# Patient Record
Sex: FEMALE | Race: OTHER | HISPANIC OR LATINO
[De-identification: names, ages, dates, MRNs, and addresses within clinical notes are randomized per-mention and may not be internally consistent; named-entity substitution may affect disease eponyms.]

---

## 2017-12-05 ENCOUNTER — LABORATORY RESULT (OUTPATIENT)
Age: 27
End: 2017-12-05

## 2017-12-06 ENCOUNTER — APPOINTMENT (OUTPATIENT)
Dept: OBGYN | Facility: CLINIC | Age: 27
End: 2017-12-06
Payer: MEDICAID

## 2017-12-06 ENCOUNTER — APPOINTMENT (OUTPATIENT)
Dept: OBGYN | Facility: CLINIC | Age: 27
End: 2017-12-06

## 2017-12-06 ENCOUNTER — LABORATORY RESULT (OUTPATIENT)
Age: 27
End: 2017-12-06

## 2017-12-06 VITALS — BODY MASS INDEX: 35.7 KG/M2 | HEIGHT: 62 IN | WEIGHT: 194 LBS

## 2017-12-06 PROCEDURE — 99214 OFFICE O/P EST MOD 30 MIN: CPT

## 2017-12-07 ENCOUNTER — NON-APPOINTMENT (OUTPATIENT)
Age: 27
End: 2017-12-07

## 2017-12-07 LAB
BASOPHILS # BLD AUTO: 0.01 K/UL
BASOPHILS NFR BLD AUTO: 0.1 %
BLD GP AB SCN SERPL QL: NORMAL
EOSINOPHIL # BLD AUTO: 0.12 K/UL
EOSINOPHIL NFR BLD AUTO: 1.1 %
HBA1C MFR BLD HPLC: 5.3 %
HBV SURFACE AG SER QL: NONREACTIVE
HCT VFR BLD CALC: 41.5 %
HGB BLD-MCNC: 14 G/DL
IMM GRANULOCYTES NFR BLD AUTO: 0.3 %
LYMPHOCYTES # BLD AUTO: 2.12 K/UL
LYMPHOCYTES NFR BLD AUTO: 19.5 %
MAN DIFF?: NORMAL
MCHC RBC-ENTMCNC: 31.1 PG
MCHC RBC-ENTMCNC: 33.7 GM/DL
MCV RBC AUTO: 92.2 FL
MEV IGG FLD QL IA: 18.7 AU/ML
MEV IGG+IGM SER-IMP: NEGATIVE
MONOCYTES # BLD AUTO: 0.61 K/UL
MONOCYTES NFR BLD AUTO: 5.6 %
NEUTROPHILS # BLD AUTO: 7.99 K/UL
NEUTROPHILS NFR BLD AUTO: 73.4 %
PLATELET # BLD AUTO: 211 K/UL
RBC # BLD: 4.5 M/UL
RBC # FLD: 14 %
VZV AB TITR SER: POSITIVE
VZV IGG SER IF-ACNC: 225.6 INDEX
WBC # FLD AUTO: 10.88 K/UL

## 2017-12-08 LAB
ADJUSTED MITOGEN: 7.82 IU/ML
ADJUSTED TB AG: -0.01 IU/ML
BACTERIA UR CULT: NORMAL
C TRACH RRNA SPEC QL NAA+PROBE: NOT DETECTED
HPV HIGH+LOW RISK DNA PNL CVX: NOT DETECTED
M TB IFN-G BLD-IMP: NEGATIVE
MUV AB SER-ACNC: NORMAL
MUV IGG SER QL IA: 9.3 AU/ML
N GONORRHOEA RRNA SPEC QL NAA+PROBE: NOT DETECTED
QUANTIFERON GOLD NIL: 0.02 IU/ML
SOURCE TP AMPLIFICATION: NORMAL

## 2017-12-10 LAB — CYTOLOGY CVX/VAG DOC THIN PREP: NORMAL

## 2017-12-13 LAB — FMR1 GENE MUT ANL BLD/T: NORMAL

## 2017-12-15 ENCOUNTER — APPOINTMENT (OUTPATIENT)
Dept: ANTEPARTUM | Facility: CLINIC | Age: 27
End: 2017-12-15
Payer: MEDICAID

## 2017-12-15 ENCOUNTER — ASOB RESULT (OUTPATIENT)
Age: 27
End: 2017-12-15

## 2017-12-15 ENCOUNTER — APPOINTMENT (OUTPATIENT)
Dept: MATERNAL FETAL MEDICINE | Facility: CLINIC | Age: 27
End: 2017-12-15
Payer: MEDICAID

## 2017-12-15 PROCEDURE — 76813 OB US NUCHAL MEAS 1 GEST: CPT

## 2017-12-15 PROCEDURE — 76801 OB US < 14 WKS SINGLE FETUS: CPT

## 2017-12-15 PROCEDURE — 36415 COLL VENOUS BLD VENIPUNCTURE: CPT

## 2017-12-15 PROCEDURE — 36416 COLLJ CAPILLARY BLOOD SPEC: CPT

## 2017-12-15 PROCEDURE — 99211 OFF/OP EST MAY X REQ PHY/QHP: CPT | Mod: 25

## 2017-12-18 LAB
AR GENE MUT ANL BLD/T: NORMAL
CFTR MUT TESTED BLD/T: NORMAL

## 2017-12-29 ENCOUNTER — APPOINTMENT (OUTPATIENT)
Dept: MATERNAL FETAL MEDICINE | Facility: CLINIC | Age: 27
End: 2017-12-29
Payer: MEDICAID

## 2017-12-29 ENCOUNTER — ASOB RESULT (OUTPATIENT)
Age: 27
End: 2017-12-29

## 2017-12-29 LAB
1ST TRIMESTER DATA: NORMAL
ADDENDUM DOC: NORMAL
AFP PNL SERPL: NORMAL
AFP SERPL-ACNC: NORMAL
CLINICAL BIOCHEMIST REVIEW: NORMAL
FREE BETA HCG 1ST TRIMESTER: NORMAL
Lab: NORMAL
NASAL BONE: PRESENT
NOTES NTD: NORMAL
NT: NORMAL
PAPP-A SERPL-ACNC: NORMAL
TRISOMY 18/3: NORMAL

## 2017-12-29 PROCEDURE — 99241 OFFICE CONSULTATION NEW/ESTAB PATIENT 15 MIN: CPT

## 2018-01-03 ENCOUNTER — NON-APPOINTMENT (OUTPATIENT)
Age: 28
End: 2018-01-03

## 2018-01-03 ENCOUNTER — APPOINTMENT (OUTPATIENT)
Dept: OBGYN | Facility: CLINIC | Age: 28
End: 2018-01-03
Payer: MEDICAID

## 2018-01-03 VITALS
WEIGHT: 194 LBS | HEART RATE: 96 BPM | BODY MASS INDEX: 35.7 KG/M2 | HEIGHT: 62 IN | SYSTOLIC BLOOD PRESSURE: 106 MMHG | DIASTOLIC BLOOD PRESSURE: 72 MMHG

## 2018-01-03 PROCEDURE — 99212 OFFICE O/P EST SF 10 MIN: CPT

## 2018-01-12 ENCOUNTER — APPOINTMENT (OUTPATIENT)
Dept: ANTEPARTUM | Facility: CLINIC | Age: 28
End: 2018-01-12

## 2018-01-18 LAB
1ST TRIMESTER DATA: NORMAL
2ND TRIMESTER DATA: NORMAL
AFP PNL SERPL: NORMAL
AFP SERPL-ACNC: NORMAL
AFP SERPL-ACNC: NORMAL
B-HCG FREE SERPL-MCNC: NORMAL
CLINICAL BIOCHEMIST REVIEW: NORMAL
FREE BETA HCG 1ST TRIMESTER: NORMAL
INHIBIN A SERPL-MCNC: NORMAL
NASAL BONE: PRESENT
NOTES NTD: NORMAL
NT: NORMAL
PAPP-A SERPL-ACNC: NORMAL
U ESTRIOL SERPL-SCNC: NORMAL

## 2018-01-26 ENCOUNTER — APPOINTMENT (OUTPATIENT)
Dept: MATERNAL FETAL MEDICINE | Facility: CLINIC | Age: 28
End: 2018-01-26

## 2018-01-31 ENCOUNTER — NON-APPOINTMENT (OUTPATIENT)
Age: 28
End: 2018-01-31

## 2018-01-31 ENCOUNTER — APPOINTMENT (OUTPATIENT)
Dept: OBGYN | Facility: CLINIC | Age: 28
End: 2018-01-31
Payer: MEDICAID

## 2018-01-31 ENCOUNTER — TRANSCRIPTION ENCOUNTER (OUTPATIENT)
Age: 28
End: 2018-01-31

## 2018-01-31 VITALS
DIASTOLIC BLOOD PRESSURE: 71 MMHG | HEIGHT: 62 IN | HEART RATE: 98 BPM | BODY MASS INDEX: 36.72 KG/M2 | SYSTOLIC BLOOD PRESSURE: 105 MMHG | WEIGHT: 199.51 LBS

## 2018-01-31 PROCEDURE — 99212 OFFICE O/P EST SF 10 MIN: CPT

## 2018-02-09 ENCOUNTER — APPOINTMENT (OUTPATIENT)
Dept: ANTEPARTUM | Facility: CLINIC | Age: 28
End: 2018-02-09
Payer: MEDICAID

## 2018-02-09 ENCOUNTER — ASOB RESULT (OUTPATIENT)
Age: 28
End: 2018-02-09

## 2018-02-09 PROCEDURE — 76817 TRANSVAGINAL US OBSTETRIC: CPT

## 2018-02-09 PROCEDURE — 76811 OB US DETAILED SNGL FETUS: CPT

## 2018-02-26 ENCOUNTER — ASOB RESULT (OUTPATIENT)
Age: 28
End: 2018-02-26

## 2018-02-26 ENCOUNTER — APPOINTMENT (OUTPATIENT)
Dept: ANTEPARTUM | Facility: CLINIC | Age: 28
End: 2018-02-26
Payer: MEDICAID

## 2018-02-26 PROCEDURE — 76816 OB US FOLLOW-UP PER FETUS: CPT

## 2018-03-02 ENCOUNTER — NON-APPOINTMENT (OUTPATIENT)
Age: 28
End: 2018-03-02

## 2018-03-02 ENCOUNTER — APPOINTMENT (OUTPATIENT)
Dept: OBGYN | Facility: CLINIC | Age: 28
End: 2018-03-02
Payer: MEDICAID

## 2018-03-02 VITALS
DIASTOLIC BLOOD PRESSURE: 80 MMHG | BODY MASS INDEX: 36.96 KG/M2 | WEIGHT: 200.84 LBS | HEIGHT: 62 IN | SYSTOLIC BLOOD PRESSURE: 114 MMHG

## 2018-03-02 PROCEDURE — 99213 OFFICE O/P EST LOW 20 MIN: CPT

## 2018-03-07 LAB
B19V IGG SER QL IA: 0.5 INDEX
B19V IGG+IGM SER-IMP: NEGATIVE
B19V IGG+IGM SER-IMP: NORMAL
B19V IGM FLD-ACNC: 0.1 INDEX
B19V IGM SER-ACNC: NEGATIVE
HGB A MFR BLD: 97.3 %
HGB A2 MFR BLD: 2.7 %
HGB FRACT BLD-IMP: NORMAL
T PALLIDUM AB SER DONR QL IF: NONREACTIVE

## 2018-03-26 ENCOUNTER — APPOINTMENT (OUTPATIENT)
Dept: OBGYN | Facility: CLINIC | Age: 28
End: 2018-03-26

## 2018-03-26 ENCOUNTER — APPOINTMENT (OUTPATIENT)
Dept: OBGYN | Facility: CLINIC | Age: 28
End: 2018-03-26
Payer: MEDICAID

## 2018-03-26 VITALS
SYSTOLIC BLOOD PRESSURE: 137 MMHG | HEART RATE: 104 BPM | WEIGHT: 205 LBS | DIASTOLIC BLOOD PRESSURE: 90 MMHG | BODY MASS INDEX: 37.5 KG/M2

## 2018-03-26 PROCEDURE — 36415 COLL VENOUS BLD VENIPUNCTURE: CPT

## 2018-03-26 PROCEDURE — 99213 OFFICE O/P EST LOW 20 MIN: CPT

## 2018-03-26 PROCEDURE — 90688 IIV4 VACCINE SPLT 0.5 ML IM: CPT

## 2018-03-26 PROCEDURE — G0008: CPT

## 2018-03-28 LAB
BLD GP AB SCN SERPL QL: NORMAL
GLUCOSE 1H P 50 G GLC PO SERPL-MCNC: 132 MG/DL

## 2018-03-30 ENCOUNTER — ASOB RESULT (OUTPATIENT)
Age: 28
End: 2018-03-30

## 2018-03-30 ENCOUNTER — APPOINTMENT (OUTPATIENT)
Dept: ANTEPARTUM | Facility: CLINIC | Age: 28
End: 2018-03-30
Payer: MEDICAID

## 2018-03-30 PROCEDURE — 76816 OB US FOLLOW-UP PER FETUS: CPT

## 2018-04-02 ENCOUNTER — APPOINTMENT (OUTPATIENT)
Dept: OBGYN | Facility: CLINIC | Age: 28
End: 2018-04-02
Payer: MEDICAID

## 2018-04-02 PROCEDURE — 90384 RH IG FULL-DOSE IM: CPT

## 2018-04-02 PROCEDURE — 96372 THER/PROPH/DIAG INJ SC/IM: CPT

## 2018-04-02 RX ORDER — HUMAN RHO(D) IMMUNE GLOBULIN 300 UG/1
1500 INJECTION, SOLUTION INTRAMUSCULAR
Refills: 0 | Status: COMPLETED | OUTPATIENT
Start: 2018-04-02

## 2018-04-02 RX ADMIN — HUMAN RHO(D) IMMUNE GLOBULIN 0 UNIT: 300 INJECTION, SOLUTION INTRAMUSCULAR at 00:00

## 2018-04-13 ENCOUNTER — NON-APPOINTMENT (OUTPATIENT)
Age: 28
End: 2018-04-13

## 2018-04-13 ENCOUNTER — APPOINTMENT (OUTPATIENT)
Dept: OBGYN | Facility: CLINIC | Age: 28
End: 2018-04-13
Payer: MEDICAID

## 2018-04-13 VITALS
DIASTOLIC BLOOD PRESSURE: 73 MMHG | SYSTOLIC BLOOD PRESSURE: 106 MMHG | WEIGHT: 206 LBS | BODY MASS INDEX: 37.91 KG/M2 | HEIGHT: 62 IN

## 2018-04-13 PROCEDURE — 99213 OFFICE O/P EST LOW 20 MIN: CPT

## 2018-04-27 ENCOUNTER — APPOINTMENT (OUTPATIENT)
Dept: OBGYN | Facility: CLINIC | Age: 28
End: 2018-04-27

## 2018-04-27 DIAGNOSIS — Z3A.19 19 WEEKS GESTATION OF PREGNANCY: ICD-10-CM

## 2018-04-27 DIAGNOSIS — Z3A.23 23 WEEKS GESTATION OF PREGNANCY: ICD-10-CM

## 2018-04-27 DIAGNOSIS — Z3A.15 15 WEEKS GESTATION OF PREGNANCY: ICD-10-CM

## 2018-04-27 DIAGNOSIS — Z3A.25 25 WEEKS GESTATION OF PREGNANCY: ICD-10-CM

## 2018-04-27 DIAGNOSIS — Z3A.29 29 WEEKS GESTATION OF PREGNANCY: ICD-10-CM

## 2018-04-27 DIAGNOSIS — Z3A.10 10 WEEKS GESTATION OF PREGNANCY: ICD-10-CM

## 2018-04-27 RX ORDER — HUMAN RHO(D) IMMUNE GLOBULIN 300 UG/1
1500 INJECTION, SOLUTION INTRAMUSCULAR
Qty: 0 | Refills: 0 | Status: COMPLETED | OUTPATIENT
Start: 2018-04-02

## 2018-05-07 ENCOUNTER — APPOINTMENT (OUTPATIENT)
Dept: OBGYN | Facility: CLINIC | Age: 28
End: 2018-05-07
Payer: MEDICAID

## 2018-05-07 ENCOUNTER — NON-APPOINTMENT (OUTPATIENT)
Age: 28
End: 2018-05-07

## 2018-05-07 VITALS
SYSTOLIC BLOOD PRESSURE: 102 MMHG | HEART RATE: 108 BPM | WEIGHT: 220 LBS | DIASTOLIC BLOOD PRESSURE: 68 MMHG | BODY MASS INDEX: 40.24 KG/M2

## 2018-05-07 PROCEDURE — 99213 OFFICE O/P EST LOW 20 MIN: CPT | Mod: 25

## 2018-05-07 PROCEDURE — 90471 IMMUNIZATION ADMIN: CPT

## 2018-05-07 PROCEDURE — 90715 TDAP VACCINE 7 YRS/> IM: CPT

## 2018-05-11 ENCOUNTER — APPOINTMENT (OUTPATIENT)
Dept: OBGYN | Facility: CLINIC | Age: 28
End: 2018-05-11

## 2018-05-21 ENCOUNTER — APPOINTMENT (OUTPATIENT)
Dept: ANTEPARTUM | Facility: CLINIC | Age: 28
End: 2018-05-21
Payer: MEDICAID

## 2018-05-21 ENCOUNTER — NON-APPOINTMENT (OUTPATIENT)
Age: 28
End: 2018-05-21

## 2018-05-21 ENCOUNTER — ASOB RESULT (OUTPATIENT)
Age: 28
End: 2018-05-21

## 2018-05-21 ENCOUNTER — APPOINTMENT (OUTPATIENT)
Dept: OBGYN | Facility: CLINIC | Age: 28
End: 2018-05-21
Payer: MEDICAID

## 2018-05-21 VITALS
WEIGHT: 217 LBS | BODY MASS INDEX: 39.93 KG/M2 | HEIGHT: 62 IN | DIASTOLIC BLOOD PRESSURE: 87 MMHG | SYSTOLIC BLOOD PRESSURE: 131 MMHG | HEART RATE: 96 BPM

## 2018-05-21 PROCEDURE — 36415 COLL VENOUS BLD VENIPUNCTURE: CPT

## 2018-05-21 PROCEDURE — 76819 FETAL BIOPHYS PROFIL W/O NST: CPT

## 2018-05-21 PROCEDURE — 76816 OB US FOLLOW-UP PER FETUS: CPT

## 2018-05-21 PROCEDURE — 99213 OFFICE O/P EST LOW 20 MIN: CPT

## 2018-06-01 ENCOUNTER — NON-APPOINTMENT (OUTPATIENT)
Age: 28
End: 2018-06-01

## 2018-06-01 ENCOUNTER — APPOINTMENT (OUTPATIENT)
Dept: OBGYN | Facility: CLINIC | Age: 28
End: 2018-06-01
Payer: MEDICAID

## 2018-06-01 DIAGNOSIS — O09.892 SUPERVISION OF OTHER HIGH RISK PREGNANCIES, SECOND TRIMESTER: ICD-10-CM

## 2018-06-01 DIAGNOSIS — Z67.91 UNSPECIFIED BLOOD TYPE, RH NEGATIVE: ICD-10-CM

## 2018-06-01 DIAGNOSIS — Z3A.34 34 WEEKS GESTATION OF PREGNANCY: ICD-10-CM

## 2018-06-01 DIAGNOSIS — Z3A.32 32 WEEKS GESTATION OF PREGNANCY: ICD-10-CM

## 2018-06-01 DIAGNOSIS — Z67.91 SUPERVISION OF OTHER HIGH RISK PREGNANCIES, SECOND TRIMESTER: ICD-10-CM

## 2018-06-01 LAB
GP B STREP DNA SPEC QL NAA+PROBE: DETECTED
GP B STREP DNA SPEC QL NAA+PROBE: NORMAL
HIV1+2 AB SPEC QL IA.RAPID: NONREACTIVE
SOURCE GBS: NORMAL

## 2018-06-01 PROCEDURE — 99213 OFFICE O/P EST LOW 20 MIN: CPT

## 2018-06-11 ENCOUNTER — OUTPATIENT (OUTPATIENT)
Dept: OUTPATIENT SERVICES | Facility: HOSPITAL | Age: 28
LOS: 1 days | End: 2018-06-11
Payer: MEDICAID

## 2018-06-11 ENCOUNTER — NON-APPOINTMENT (OUTPATIENT)
Age: 28
End: 2018-06-11

## 2018-06-11 ENCOUNTER — APPOINTMENT (OUTPATIENT)
Dept: OBGYN | Facility: CLINIC | Age: 28
End: 2018-06-11
Payer: MEDICAID

## 2018-06-11 VITALS
WEIGHT: 222 LBS | BODY MASS INDEX: 40.6 KG/M2 | HEART RATE: 102 BPM | DIASTOLIC BLOOD PRESSURE: 91 MMHG | SYSTOLIC BLOOD PRESSURE: 137 MMHG

## 2018-06-11 VITALS
DIASTOLIC BLOOD PRESSURE: 91 MMHG | WEIGHT: 222 LBS | HEART RATE: 72 BPM | SYSTOLIC BLOOD PRESSURE: 137 MMHG | BODY MASS INDEX: 40.6 KG/M2

## 2018-06-11 DIAGNOSIS — O35.9XX0 MATERNAL CARE FOR (SUSPECTED) FETAL ABNORMALITY AND DAMAGE, UNSPECIFIED, NOT APPLICABLE OR UNSPECIFIED: ICD-10-CM

## 2018-06-11 LAB
ALBUMIN SERPL ELPH-MCNC: 3.3 G/DL — SIGNIFICANT CHANGE UP (ref 3.3–5.2)
ALP SERPL-CCNC: 166 U/L — HIGH (ref 40–120)
ALT FLD-CCNC: 13 U/L — SIGNIFICANT CHANGE UP
ANION GAP SERPL CALC-SCNC: 16 MMOL/L — SIGNIFICANT CHANGE UP (ref 5–17)
APPEARANCE UR: SIGNIFICANT CHANGE UP
AST SERPL-CCNC: 16 U/L — SIGNIFICANT CHANGE UP
BACTERIA # UR AUTO: ABNORMAL
BASOPHILS # BLD AUTO: 0 K/UL — SIGNIFICANT CHANGE UP (ref 0–0.2)
BASOPHILS NFR BLD AUTO: 0.1 % — SIGNIFICANT CHANGE UP (ref 0–2)
BILIRUB SERPL-MCNC: <0.2 MG/DL — LOW (ref 0.4–2)
BILIRUB UR-MCNC: NEGATIVE — SIGNIFICANT CHANGE UP
BUN SERPL-MCNC: 10 MG/DL — SIGNIFICANT CHANGE UP (ref 8–20)
CALCIUM SERPL-MCNC: 9 MG/DL — SIGNIFICANT CHANGE UP (ref 8.6–10.2)
CHLORIDE SERPL-SCNC: 103 MMOL/L — SIGNIFICANT CHANGE UP (ref 98–107)
CO2 SERPL-SCNC: 20 MMOL/L — LOW (ref 22–29)
COLOR SPEC: ABNORMAL
COMMENT - URINE: SIGNIFICANT CHANGE UP
CREAT ?TM UR-MCNC: 18 MG/DL — SIGNIFICANT CHANGE UP
CREAT SERPL-MCNC: 0.55 MG/DL — SIGNIFICANT CHANGE UP (ref 0.5–1.3)
DIFF PNL FLD: ABNORMAL
EOSINOPHIL # BLD AUTO: 0.1 K/UL — SIGNIFICANT CHANGE UP (ref 0–0.5)
EOSINOPHIL NFR BLD AUTO: 1 % — SIGNIFICANT CHANGE UP (ref 0–6)
EPI CELLS # UR: ABNORMAL
GLUCOSE SERPL-MCNC: 83 MG/DL — SIGNIFICANT CHANGE UP (ref 70–115)
GLUCOSE UR QL: NEGATIVE — SIGNIFICANT CHANGE UP
HCT VFR BLD CALC: 41.2 % — SIGNIFICANT CHANGE UP (ref 37–47)
HGB BLD-MCNC: 13.9 G/DL — SIGNIFICANT CHANGE UP (ref 12–16)
KETONES UR-MCNC: NEGATIVE — SIGNIFICANT CHANGE UP
LEUKOCYTE ESTERASE UR-ACNC: ABNORMAL
LYMPHOCYTES # BLD AUTO: 2.6 K/UL — SIGNIFICANT CHANGE UP (ref 1–4.8)
LYMPHOCYTES # BLD AUTO: 23.1 % — SIGNIFICANT CHANGE UP (ref 20–55)
MCHC RBC-ENTMCNC: 30.3 PG — SIGNIFICANT CHANGE UP (ref 27–31)
MCHC RBC-ENTMCNC: 33.7 G/DL — SIGNIFICANT CHANGE UP (ref 32–36)
MCV RBC AUTO: 90 FL — SIGNIFICANT CHANGE UP (ref 81–99)
MONOCYTES # BLD AUTO: 0.8 K/UL — SIGNIFICANT CHANGE UP (ref 0–0.8)
MONOCYTES NFR BLD AUTO: 7.4 % — SIGNIFICANT CHANGE UP (ref 3–10)
NEUTROPHILS # BLD AUTO: 7.6 K/UL — SIGNIFICANT CHANGE UP (ref 1.8–8)
NEUTROPHILS NFR BLD AUTO: 68.1 % — SIGNIFICANT CHANGE UP (ref 37–73)
NITRITE UR-MCNC: NEGATIVE — SIGNIFICANT CHANGE UP
PH UR: 6.5 — SIGNIFICANT CHANGE UP (ref 5–8)
PLATELET # BLD AUTO: 168 K/UL — SIGNIFICANT CHANGE UP (ref 150–400)
POTASSIUM SERPL-MCNC: 4.1 MMOL/L — SIGNIFICANT CHANGE UP (ref 3.5–5.3)
POTASSIUM SERPL-SCNC: 4.1 MMOL/L — SIGNIFICANT CHANGE UP (ref 3.5–5.3)
PROT ?TM UR-MCNC: 11 MG/DL — SIGNIFICANT CHANGE UP (ref 0–12)
PROT SERPL-MCNC: 6.8 G/DL — SIGNIFICANT CHANGE UP (ref 6.6–8.7)
PROT UR-MCNC: 30 MG/DL
PROT/CREAT UR-RTO: 0.6 RATIO — HIGH
RBC # BLD: 4.58 M/UL — SIGNIFICANT CHANGE UP (ref 4.4–5.2)
RBC # FLD: 14.2 % — SIGNIFICANT CHANGE UP (ref 11–15.6)
RBC CASTS # UR COMP ASSIST: SIGNIFICANT CHANGE UP /HPF (ref 0–4)
SODIUM SERPL-SCNC: 139 MMOL/L — SIGNIFICANT CHANGE UP (ref 135–145)
SP GR SPEC: 1 — LOW (ref 1.01–1.02)
UROBILINOGEN FLD QL: NEGATIVE — SIGNIFICANT CHANGE UP
WBC # BLD: 11.1 K/UL — HIGH (ref 4.8–10.8)
WBC # FLD AUTO: 11.1 K/UL — HIGH (ref 4.8–10.8)
WBC UR QL: >50

## 2018-06-11 PROCEDURE — G0463: CPT

## 2018-06-11 PROCEDURE — 85027 COMPLETE CBC AUTOMATED: CPT

## 2018-06-11 PROCEDURE — 36415 COLL VENOUS BLD VENIPUNCTURE: CPT

## 2018-06-11 PROCEDURE — 84156 ASSAY OF PROTEIN URINE: CPT

## 2018-06-11 PROCEDURE — 59025 FETAL NON-STRESS TEST: CPT

## 2018-06-11 PROCEDURE — 81001 URINALYSIS AUTO W/SCOPE: CPT

## 2018-06-11 PROCEDURE — 80053 COMPREHEN METABOLIC PANEL: CPT

## 2018-06-11 PROCEDURE — 99213 OFFICE O/P EST LOW 20 MIN: CPT

## 2018-06-18 ENCOUNTER — APPOINTMENT (OUTPATIENT)
Dept: OBGYN | Facility: CLINIC | Age: 28
End: 2018-06-18
Payer: MEDICAID

## 2018-06-18 ENCOUNTER — NON-APPOINTMENT (OUTPATIENT)
Age: 28
End: 2018-06-18

## 2018-06-18 VITALS
DIASTOLIC BLOOD PRESSURE: 92 MMHG | HEIGHT: 62 IN | SYSTOLIC BLOOD PRESSURE: 133 MMHG | WEIGHT: 219 LBS | BODY MASS INDEX: 40.3 KG/M2 | HEART RATE: 94 BPM

## 2018-06-18 PROCEDURE — 99213 OFFICE O/P EST LOW 20 MIN: CPT

## 2018-06-19 ENCOUNTER — INPATIENT (INPATIENT)
Facility: HOSPITAL | Age: 28
LOS: 1 days | Discharge: HOME | End: 2018-06-21
Attending: OBSTETRICS & GYNECOLOGY | Admitting: OBSTETRICS & GYNECOLOGY
Payer: MEDICAID

## 2018-06-19 ENCOUNTER — TRANSCRIPTION ENCOUNTER (OUTPATIENT)
Age: 28
End: 2018-06-19

## 2018-06-19 VITALS — HEIGHT: 62 IN | WEIGHT: 218.26 LBS

## 2018-06-19 DIAGNOSIS — O26.893 OTHER SPECIFIED PREGNANCY RELATED CONDITIONS, THIRD TRIMESTER: ICD-10-CM

## 2018-06-19 DIAGNOSIS — O47.1 FALSE LABOR AT OR AFTER 37 COMPLETED WEEKS OF GESTATION: ICD-10-CM

## 2018-06-19 LAB
ALBUMIN SERPL ELPH-MCNC: 3 G/DL — LOW (ref 3.3–5.2)
ALP SERPL-CCNC: 167 U/L — HIGH (ref 40–120)
ALT FLD-CCNC: 17 U/L — SIGNIFICANT CHANGE UP
ANION GAP SERPL CALC-SCNC: 13 MMOL/L — SIGNIFICANT CHANGE UP (ref 5–17)
APPEARANCE UR: ABNORMAL
APTT BLD: 30.2 SEC — SIGNIFICANT CHANGE UP (ref 27.5–37.4)
AST SERPL-CCNC: 24 U/L — SIGNIFICANT CHANGE UP
BACTERIA # UR AUTO: ABNORMAL
BASE EXCESS BLDCOA CALC-SCNC: -1.2 MMOL/L — SIGNIFICANT CHANGE UP (ref -2–2)
BASE EXCESS BLDCOV CALC-SCNC: -2.5 MMOL/L — LOW (ref -2–2)
BASOPHILS # BLD AUTO: 0 K/UL — SIGNIFICANT CHANGE UP (ref 0–0.2)
BASOPHILS NFR BLD AUTO: 0.1 % — SIGNIFICANT CHANGE UP (ref 0–2)
BILIRUB SERPL-MCNC: 0.2 MG/DL — LOW (ref 0.4–2)
BILIRUB UR-MCNC: NEGATIVE — SIGNIFICANT CHANGE UP
BLD GP AB SCN SERPL QL: SIGNIFICANT CHANGE UP
BUN SERPL-MCNC: 10 MG/DL — SIGNIFICANT CHANGE UP (ref 8–20)
CALCIUM SERPL-MCNC: 8.9 MG/DL — SIGNIFICANT CHANGE UP (ref 8.6–10.2)
CHLORIDE SERPL-SCNC: 103 MMOL/L — SIGNIFICANT CHANGE UP (ref 98–107)
CO2 SERPL-SCNC: 19 MMOL/L — LOW (ref 22–29)
COLOR SPEC: YELLOW — SIGNIFICANT CHANGE UP
CREAT SERPL-MCNC: 0.62 MG/DL — SIGNIFICANT CHANGE UP (ref 0.5–1.3)
DIFF PNL FLD: ABNORMAL
EOSINOPHIL # BLD AUTO: 0.1 K/UL — SIGNIFICANT CHANGE UP (ref 0–0.5)
EOSINOPHIL NFR BLD AUTO: 0.9 % — SIGNIFICANT CHANGE UP (ref 0–6)
EPI CELLS # UR: ABNORMAL
GAS PNL BLDCOV: 7.3 — SIGNIFICANT CHANGE UP (ref 7.25–7.45)
GLUCOSE SERPL-MCNC: 96 MG/DL — SIGNIFICANT CHANGE UP (ref 70–115)
GLUCOSE UR QL: NEGATIVE MG/DL — SIGNIFICANT CHANGE UP
HCO3 BLDCOA-SCNC: 21 MMOL/L — SIGNIFICANT CHANGE UP (ref 21–29)
HCO3 BLDCOV-SCNC: 21 MMOL/L — SIGNIFICANT CHANGE UP (ref 21–29)
HCT VFR BLD CALC: 41.5 % — SIGNIFICANT CHANGE UP (ref 37–47)
HGB BLD-MCNC: 14.1 G/DL — SIGNIFICANT CHANGE UP (ref 12–16)
INR BLD: 0.92 RATIO — SIGNIFICANT CHANGE UP (ref 0.88–1.16)
KETONES UR-MCNC: NEGATIVE — SIGNIFICANT CHANGE UP
LEUKOCYTE ESTERASE UR-ACNC: ABNORMAL
LYMPHOCYTES # BLD AUTO: 19.5 % — LOW (ref 20–55)
LYMPHOCYTES # BLD AUTO: 2.2 K/UL — SIGNIFICANT CHANGE UP (ref 1–4.8)
MCHC RBC-ENTMCNC: 30.5 PG — SIGNIFICANT CHANGE UP (ref 27–31)
MCHC RBC-ENTMCNC: 34 G/DL — SIGNIFICANT CHANGE UP (ref 32–36)
MCV RBC AUTO: 89.6 FL — SIGNIFICANT CHANGE UP (ref 81–99)
MONOCYTES # BLD AUTO: 0.9 K/UL — HIGH (ref 0–0.8)
MONOCYTES NFR BLD AUTO: 8 % — SIGNIFICANT CHANGE UP (ref 3–10)
NEUTROPHILS # BLD AUTO: 7.9 K/UL — SIGNIFICANT CHANGE UP (ref 1.8–8)
NEUTROPHILS NFR BLD AUTO: 71.1 % — SIGNIFICANT CHANGE UP (ref 37–73)
NITRITE UR-MCNC: NEGATIVE — SIGNIFICANT CHANGE UP
PCO2 BLDCOA: 58.7 MMHG — SIGNIFICANT CHANGE UP (ref 32–68)
PCO2 BLDCOV: 50.3 MMHG — SIGNIFICANT CHANGE UP (ref 29–53)
PH BLDCOA: 7.28 — SIGNIFICANT CHANGE UP (ref 7.18–7.38)
PH UR: 6 — SIGNIFICANT CHANGE UP (ref 5–8)
PLATELET # BLD AUTO: 163 K/UL — SIGNIFICANT CHANGE UP (ref 150–400)
PO2 BLDCOA: 16 MMHG — SIGNIFICANT CHANGE UP (ref 5.7–30.5)
PO2 BLDCOA: 24.1 MMHG — SIGNIFICANT CHANGE UP (ref 17–41)
POTASSIUM SERPL-MCNC: 4.1 MMOL/L — SIGNIFICANT CHANGE UP (ref 3.5–5.3)
POTASSIUM SERPL-SCNC: 4.1 MMOL/L — SIGNIFICANT CHANGE UP (ref 3.5–5.3)
PROT SERPL-MCNC: 6.7 G/DL — SIGNIFICANT CHANGE UP (ref 6.6–8.7)
PROT UR-MCNC: 30 MG/DL
PROTHROM AB SERPL-ACNC: 10.1 SEC — SIGNIFICANT CHANGE UP (ref 9.8–12.7)
RBC # BLD: 4.63 M/UL — SIGNIFICANT CHANGE UP (ref 4.4–5.2)
RBC # FLD: 14.3 % — SIGNIFICANT CHANGE UP (ref 11–15.6)
RBC CASTS # UR COMP ASSIST: ABNORMAL /HPF (ref 0–4)
SAO2 % BLDCOA: SIGNIFICANT CHANGE UP
SAO2 % BLDCOV: SIGNIFICANT CHANGE UP
SODIUM SERPL-SCNC: 135 MMOL/L — SIGNIFICANT CHANGE UP (ref 135–145)
SP GR SPEC: 1.02 — SIGNIFICANT CHANGE UP (ref 1.01–1.02)
T PALLIDUM AB TITR SER: NEGATIVE — SIGNIFICANT CHANGE UP
TYPE + AB SCN PNL BLD: SIGNIFICANT CHANGE UP
UROBILINOGEN FLD QL: 1 MG/DL
WBC # BLD: 11.2 K/UL — HIGH (ref 4.8–10.8)
WBC # FLD AUTO: 11.2 K/UL — HIGH (ref 4.8–10.8)
WBC UR QL: SIGNIFICANT CHANGE UP /HPF (ref 0–5)

## 2018-06-19 PROCEDURE — 59409 OBSTETRICAL CARE: CPT | Mod: U9

## 2018-06-19 RX ORDER — DIPHENHYDRAMINE HCL 50 MG
25 CAPSULE ORAL EVERY 6 HOURS
Qty: 0 | Refills: 0 | Status: DISCONTINUED | OUTPATIENT
Start: 2018-06-20 | End: 2018-06-21

## 2018-06-19 RX ORDER — OXYTOCIN 10 UNIT/ML
333.33 VIAL (ML) INJECTION
Qty: 20 | Refills: 0 | Status: COMPLETED | OUTPATIENT
Start: 2018-06-19

## 2018-06-19 RX ORDER — PENICILLIN G POTASSIUM 5000000 [IU]/1
2.5 POWDER, FOR SOLUTION INTRAMUSCULAR; INTRAPLEURAL; INTRATHECAL; INTRAVENOUS EVERY 4 HOURS
Qty: 0 | Refills: 0 | Status: DISCONTINUED | OUTPATIENT
Start: 2018-06-19 | End: 2018-06-19

## 2018-06-19 RX ORDER — ACETAMINOPHEN 500 MG
650 TABLET ORAL EVERY 6 HOURS
Qty: 0 | Refills: 0 | Status: DISCONTINUED | OUTPATIENT
Start: 2018-06-20 | End: 2018-06-21

## 2018-06-19 RX ORDER — CITRIC ACID/SODIUM CITRATE 300-500 MG
30 SOLUTION, ORAL ORAL ONCE
Qty: 0 | Refills: 0 | Status: COMPLETED | OUTPATIENT
Start: 2018-06-19 | End: 2018-06-19

## 2018-06-19 RX ORDER — DOCUSATE SODIUM 100 MG
100 CAPSULE ORAL
Qty: 0 | Refills: 0 | Status: DISCONTINUED | OUTPATIENT
Start: 2018-06-20 | End: 2018-06-21

## 2018-06-19 RX ORDER — SODIUM CHLORIDE 9 MG/ML
1000 INJECTION, SOLUTION INTRAVENOUS ONCE
Qty: 0 | Refills: 0 | Status: COMPLETED | OUTPATIENT
Start: 2018-06-19 | End: 2018-06-19

## 2018-06-19 RX ORDER — GLYCERIN ADULT
1 SUPPOSITORY, RECTAL RECTAL AT BEDTIME
Qty: 0 | Refills: 0 | Status: DISCONTINUED | OUTPATIENT
Start: 2018-06-20 | End: 2018-06-21

## 2018-06-19 RX ORDER — SIMETHICONE 80 MG/1
80 TABLET, CHEWABLE ORAL EVERY 6 HOURS
Qty: 0 | Refills: 0 | Status: DISCONTINUED | OUTPATIENT
Start: 2018-06-20 | End: 2018-06-21

## 2018-06-19 RX ORDER — PENICILLIN G POTASSIUM 5000000 [IU]/1
5 POWDER, FOR SOLUTION INTRAMUSCULAR; INTRAPLEURAL; INTRATHECAL; INTRAVENOUS ONCE
Qty: 0 | Refills: 0 | Status: COMPLETED | OUTPATIENT
Start: 2018-06-19 | End: 2018-06-19

## 2018-06-19 RX ORDER — DIBUCAINE 1 %
1 OINTMENT (GRAM) RECTAL EVERY 4 HOURS
Qty: 0 | Refills: 0 | Status: DISCONTINUED | OUTPATIENT
Start: 2018-06-20 | End: 2018-06-21

## 2018-06-19 RX ORDER — OXYTOCIN 10 UNIT/ML
2 VIAL (ML) INJECTION
Qty: 30 | Refills: 0 | Status: DISCONTINUED | OUTPATIENT
Start: 2018-06-19 | End: 2018-06-21

## 2018-06-19 RX ORDER — LANOLIN
1 OINTMENT (GRAM) TOPICAL EVERY 6 HOURS
Qty: 0 | Refills: 0 | Status: DISCONTINUED | OUTPATIENT
Start: 2018-06-20 | End: 2018-06-21

## 2018-06-19 RX ORDER — SODIUM CHLORIDE 9 MG/ML
3 INJECTION INTRAMUSCULAR; INTRAVENOUS; SUBCUTANEOUS EVERY 8 HOURS
Qty: 0 | Refills: 0 | Status: DISCONTINUED | OUTPATIENT
Start: 2018-06-20 | End: 2018-06-21

## 2018-06-19 RX ORDER — HYDROCORTISONE 1 %
1 OINTMENT (GRAM) TOPICAL EVERY 4 HOURS
Qty: 0 | Refills: 0 | Status: DISCONTINUED | OUTPATIENT
Start: 2018-06-20 | End: 2018-06-21

## 2018-06-19 RX ORDER — AER TRAVELER 0.5 G/1
1 SOLUTION RECTAL; TOPICAL EVERY 4 HOURS
Qty: 0 | Refills: 0 | Status: DISCONTINUED | OUTPATIENT
Start: 2018-06-20 | End: 2018-06-21

## 2018-06-19 RX ORDER — SODIUM CHLORIDE 9 MG/ML
1000 INJECTION, SOLUTION INTRAVENOUS
Qty: 0 | Refills: 0 | Status: DISCONTINUED | OUTPATIENT
Start: 2018-06-19 | End: 2018-06-19

## 2018-06-19 RX ORDER — OXYTOCIN 10 UNIT/ML
41.67 VIAL (ML) INJECTION
Qty: 20 | Refills: 0 | Status: DISCONTINUED | OUTPATIENT
Start: 2018-06-19 | End: 2018-06-21

## 2018-06-19 RX ORDER — IBUPROFEN 200 MG
600 TABLET ORAL EVERY 6 HOURS
Qty: 0 | Refills: 0 | Status: DISCONTINUED | OUTPATIENT
Start: 2018-06-19 | End: 2018-06-21

## 2018-06-19 RX ORDER — LABETALOL HCL 100 MG
200 TABLET ORAL EVERY 12 HOURS
Qty: 0 | Refills: 0 | Status: DISCONTINUED | OUTPATIENT
Start: 2018-06-19 | End: 2018-06-20

## 2018-06-19 RX ORDER — OXYCODONE AND ACETAMINOPHEN 5; 325 MG/1; MG/1
2 TABLET ORAL EVERY 6 HOURS
Qty: 0 | Refills: 0 | Status: DISCONTINUED | OUTPATIENT
Start: 2018-06-20 | End: 2018-06-21

## 2018-06-19 RX ORDER — PENICILLIN G POTASSIUM 5000000 [IU]/1
POWDER, FOR SOLUTION INTRAMUSCULAR; INTRAPLEURAL; INTRATHECAL; INTRAVENOUS
Qty: 0 | Refills: 0 | Status: DISCONTINUED | OUTPATIENT
Start: 2018-06-19 | End: 2018-06-19

## 2018-06-19 RX ORDER — MAGNESIUM HYDROXIDE 400 MG/1
30 TABLET, CHEWABLE ORAL
Qty: 0 | Refills: 0 | Status: DISCONTINUED | OUTPATIENT
Start: 2018-06-20 | End: 2018-06-21

## 2018-06-19 RX ORDER — HYDRALAZINE HCL 50 MG
5 TABLET ORAL ONCE
Qty: 0 | Refills: 0 | Status: COMPLETED | OUTPATIENT
Start: 2018-06-19 | End: 2018-06-19

## 2018-06-19 RX ORDER — OXYTOCIN 10 UNIT/ML
333.33 VIAL (ML) INJECTION
Qty: 20 | Refills: 0 | Status: DISCONTINUED | OUTPATIENT
Start: 2018-06-19 | End: 2018-06-21

## 2018-06-19 RX ORDER — TETANUS TOXOID, REDUCED DIPHTHERIA TOXOID AND ACELLULAR PERTUSSIS VACCINE, ADSORBED 5; 2.5; 8; 8; 2.5 [IU]/.5ML; [IU]/.5ML; UG/.5ML; UG/.5ML; UG/.5ML
0.5 SUSPENSION INTRAMUSCULAR ONCE
Qty: 0 | Refills: 0 | Status: DISCONTINUED | OUTPATIENT
Start: 2018-06-20 | End: 2018-06-21

## 2018-06-19 RX ORDER — PRAMOXINE HYDROCHLORIDE 150 MG/15G
1 AEROSOL, FOAM RECTAL EVERY 4 HOURS
Qty: 0 | Refills: 0 | Status: DISCONTINUED | OUTPATIENT
Start: 2018-06-20 | End: 2018-06-21

## 2018-06-19 RX ADMIN — PENICILLIN G POTASSIUM 200 MILLION UNIT(S): 5000000 POWDER, FOR SOLUTION INTRAMUSCULAR; INTRAPLEURAL; INTRATHECAL; INTRAVENOUS at 18:30

## 2018-06-19 RX ADMIN — Medication 5 MILLIGRAM(S): at 15:36

## 2018-06-19 RX ADMIN — SODIUM CHLORIDE 125 MILLILITER(S): 9 INJECTION, SOLUTION INTRAVENOUS at 10:00

## 2018-06-19 RX ADMIN — Medication 600 MILLIGRAM(S): at 21:39

## 2018-06-19 RX ADMIN — PENICILLIN G POTASSIUM 200 MILLION UNIT(S): 5000000 POWDER, FOR SOLUTION INTRAMUSCULAR; INTRAPLEURAL; INTRATHECAL; INTRAVENOUS at 14:26

## 2018-06-19 RX ADMIN — Medication 125 MILLIUNIT(S)/MIN: at 21:39

## 2018-06-19 RX ADMIN — Medication 30 MILLILITER(S): at 16:49

## 2018-06-19 RX ADMIN — Medication 2 MILLIUNIT(S)/MIN: at 10:20

## 2018-06-19 RX ADMIN — Medication 600 MILLIGRAM(S): at 22:39

## 2018-06-19 RX ADMIN — Medication 1000 MILLIUNIT(S)/MIN: at 19:45

## 2018-06-19 RX ADMIN — Medication 200 MILLIGRAM(S): at 22:39

## 2018-06-19 RX ADMIN — SODIUM CHLORIDE 2000 MILLILITER(S): 9 INJECTION, SOLUTION INTRAVENOUS at 16:40

## 2018-06-19 RX ADMIN — PENICILLIN G POTASSIUM 200 MILLION UNIT(S): 5000000 POWDER, FOR SOLUTION INTRAMUSCULAR; INTRAPLEURAL; INTRATHECAL; INTRAVENOUS at 10:34

## 2018-06-19 NOTE — PATIENT PROFILE OB - AS LABOR ROM TYPE
Phone Number Called: [phone] (home)     Message: Unable to reach pt, no vm is set up.     Left Message for patient to call back: N\A         artificial rupture

## 2018-06-20 LAB — FETAL SCREEN: SIGNIFICANT CHANGE UP

## 2018-06-20 RX ORDER — LABETALOL HCL 100 MG
200 TABLET ORAL EVERY 12 HOURS
Qty: 0 | Refills: 0 | Status: DISCONTINUED | OUTPATIENT
Start: 2018-06-20 | End: 2018-06-21

## 2018-06-20 RX ADMIN — Medication 600 MILLIGRAM(S): at 23:14

## 2018-06-20 RX ADMIN — Medication 200 MILLIGRAM(S): at 11:30

## 2018-06-20 RX ADMIN — Medication 1 TABLET(S): at 11:30

## 2018-06-20 RX ADMIN — SODIUM CHLORIDE 3 MILLILITER(S): 9 INJECTION INTRAMUSCULAR; INTRAVENOUS; SUBCUTANEOUS at 04:34

## 2018-06-20 NOTE — DISCHARGE NOTE OB - CARE PROVIDER_API CALL
Alexander Riddle), Obstetrics and Gynecology  [address] [place] [address]  Phone: [phone]  Fax: [phone]

## 2018-06-20 NOTE — DISCHARGE NOTE OB - HOSPITAL COURSE
Uncomplicated hospital course. At the time of discharge patient was tolerating a regular diet, ambulating without assistance, voiding spontaneously and pain was well controlled with PRN medications. Patient aware of plan to follow up with her OB 6 weeks after discharge.

## 2018-06-20 NOTE — DISCHARGE NOTE OB - CARE PLAN
Principal Discharge DX:	 (normal spontaneous vaginal delivery)  Goal:	Delivered  Assessment and plan of treatment:	Patient should transition to regular activity level. Resume regular diet. Patient should follow up with her OB for a postpartum checkup 6 weeks after discharge from the hospital. Patient should call her doctor sooner if she develops a fever or uncontrolled vaginal bleeding.

## 2018-06-20 NOTE — DISCHARGE NOTE OB - PLAN OF CARE
Delivered Patient should transition to regular activity level. Resume regular diet. Patient should follow up with her OB for a postpartum checkup 6 weeks after discharge from the hospital. Patient should call her doctor sooner if she develops a fever or uncontrolled vaginal bleeding.

## 2018-06-20 NOTE — DISCHARGE NOTE OB - PATIENT PORTAL LINK FT
You can access the [facility], offered by [facility], by registering with the following website: [email or web address]

## 2018-06-21 VITALS
TEMPERATURE: 99 F | RESPIRATION RATE: 18 BRPM | DIASTOLIC BLOOD PRESSURE: 85 MMHG | HEART RATE: 98 BPM | SYSTOLIC BLOOD PRESSURE: 136 MMHG

## 2018-06-21 LAB
BASOPHILS # BLD AUTO: 0 K/UL — SIGNIFICANT CHANGE UP (ref 0–0.2)
BASOPHILS NFR BLD AUTO: 0.1 % — SIGNIFICANT CHANGE UP (ref 0–2)
EOSINOPHIL # BLD AUTO: 0.1 K/UL — SIGNIFICANT CHANGE UP (ref 0–0.5)
EOSINOPHIL NFR BLD AUTO: 1.2 % — SIGNIFICANT CHANGE UP (ref 0–6)
HCT VFR BLD CALC: 37.4 % — SIGNIFICANT CHANGE UP (ref 37–47)
HGB BLD-MCNC: 12.1 G/DL — SIGNIFICANT CHANGE UP (ref 12–16)
LYMPHOCYTES # BLD AUTO: 2.4 K/UL — SIGNIFICANT CHANGE UP (ref 1–4.8)
LYMPHOCYTES # BLD AUTO: 21.3 % — SIGNIFICANT CHANGE UP (ref 20–55)
MCHC RBC-ENTMCNC: 29.5 PG — SIGNIFICANT CHANGE UP (ref 27–31)
MCHC RBC-ENTMCNC: 32.4 G/DL — SIGNIFICANT CHANGE UP (ref 32–36)
MCV RBC AUTO: 91.2 FL — SIGNIFICANT CHANGE UP (ref 81–99)
MONOCYTES # BLD AUTO: 0.6 K/UL — SIGNIFICANT CHANGE UP (ref 0–0.8)
MONOCYTES NFR BLD AUTO: 5.6 % — SIGNIFICANT CHANGE UP (ref 3–10)
NEUTROPHILS # BLD AUTO: 8.1 K/UL — HIGH (ref 1.8–8)
NEUTROPHILS NFR BLD AUTO: 71.5 % — SIGNIFICANT CHANGE UP (ref 37–73)
PLATELET # BLD AUTO: 162 K/UL — SIGNIFICANT CHANGE UP (ref 150–400)
RBC # BLD: 4.1 M/UL — LOW (ref 4.4–5.2)
RBC # FLD: 14.8 % — SIGNIFICANT CHANGE UP (ref 11–15.6)
WBC # BLD: 11.3 K/UL — HIGH (ref 4.8–10.8)
WBC # FLD AUTO: 11.3 K/UL — HIGH (ref 4.8–10.8)

## 2018-06-21 PROCEDURE — 85730 THROMBOPLASTIN TIME PARTIAL: CPT

## 2018-06-21 PROCEDURE — 86762 RUBELLA ANTIBODY: CPT

## 2018-06-21 PROCEDURE — 86780 TREPONEMA PALLIDUM: CPT

## 2018-06-21 PROCEDURE — 36415 COLL VENOUS BLD VENIPUNCTURE: CPT

## 2018-06-21 PROCEDURE — 85610 PROTHROMBIN TIME: CPT

## 2018-06-21 PROCEDURE — 86850 RBC ANTIBODY SCREEN: CPT

## 2018-06-21 PROCEDURE — 85461 HEMOGLOBIN FETAL: CPT

## 2018-06-21 PROCEDURE — 86900 BLOOD TYPING SEROLOGIC ABO: CPT

## 2018-06-21 PROCEDURE — 82803 BLOOD GASES ANY COMBINATION: CPT

## 2018-06-21 PROCEDURE — 86901 BLOOD TYPING SEROLOGIC RH(D): CPT

## 2018-06-21 PROCEDURE — 80053 COMPREHEN METABOLIC PANEL: CPT

## 2018-06-21 PROCEDURE — 85027 COMPLETE CBC AUTOMATED: CPT

## 2018-06-21 PROCEDURE — 81001 URINALYSIS AUTO W/SCOPE: CPT

## 2018-06-21 RX ORDER — IBUPROFEN 200 MG
1 TABLET ORAL
Qty: 28 | Refills: 0
Start: 2018-06-21 | End: 2018-06-27

## 2018-06-21 RX ADMIN — Medication 1 TABLET(S): at 12:32

## 2018-06-21 RX ADMIN — Medication 600 MILLIGRAM(S): at 05:45

## 2018-06-21 RX ADMIN — Medication 200 MILLIGRAM(S): at 05:37

## 2018-06-21 RX ADMIN — Medication 600 MILLIGRAM(S): at 00:14

## 2018-06-21 RX ADMIN — Medication 600 MILLIGRAM(S): at 06:23

## 2018-06-21 NOTE — PROGRESS NOTE ADULT - ASSESSMENT
Patient is a 28yo  s/p  day 2.  Patient is feeling well and reports no issues.
Patient is s/p  day# 1  Patient is feeling well and reports no issues.

## 2018-06-21 NOTE — PROGRESS NOTE ADULT - SUBJECTIVE AND OBJECTIVE BOX
INTERVAL HPI/OVERNIGHT EVENTS:  27y Female s/p labor epidural on 6/19/18    Vital Signs Last 24 Hrs  T(C): 37.2 (20 Jun 2018 09:48), Max: 37.9 (19 Jun 2018 23:28)  T(F): 98.9 (20 Jun 2018 09:48), Max: 100.3 (19 Jun 2018 23:28)  HR: 98 (20 Jun 2018 09:48) (98 - 121)  BP: 114/82 (20 Jun 2018 09:48) (94/60 - 159/99)  BP(mean): --  RR: 18 (20 Jun 2018 09:48) (16 - 20)  SpO2: 95% (19 Jun 2018 23:28) (95% - 95%)    Patient seen, doing well, no headache, no residual numbness or weakness, no anesthetic complications or complaints noted or reported.
Postpartum Note Vaginal Delivery  Patient is a 26yo  s/p  day 2.    Subjective:  No acute events overnight.   Patient is tolerating diet and denies N/V.   Patient still has slight vaginal bleeding which is decreasing in amount.   She is breastfeeding and the baby is latching on.    Urinating appropriately.   -BM/+flatus.    Physical exam:  Vital Signs Last 24 Hrs  T(C): 36.4 (2018 05:35), Max: 37.2 (2018 09:48)  T(F): 97.5 (2018 05:35), Max: 98.9 (2018 09:48)  HR: 70 (2018 05:35) (70 - 98)  BP: 136/81 (2018 05:35) (110/70 - 136/81)  RR: 16 (2018 05:35) (16 - 20)  SpO2: 97% (2018 05:35) (97% - 97%)    Heart: RRR  Lungs: CTABL  Breast: non tender, not engorged   Abdomen: Soft, nontender, no distension, firm uterine fundus  Ext: No DVT signs, warm extremities    LABS:                        14.1   11.2  )-----------( 163      ( 2018 10:29 )             41.5
Postpartum Note Vaginal Delivery  Patient is a 26yo  s/p  day 1.  Patient had elevated BPs pp and was started on labetalol 200 BID.  BPs well controlled.     Subjective:  No acute events overnight.   Patient is tolerating diet and denies N/V.   Patient still has slight vaginal bleeding which is decreasing in amount.   -BM/+flatus.    Physical exam:  Vital Signs Last 24 Hrs  T(C): 37.9 (2018 23:28), Max: 37.9 (2018 23:28)  T(F): 100.3 (2018 23:28), Max: 100.3 (2018 23:28)  HR: 102 (2018 23:28) (98 - 121)  BP: 119/76 (2018 23:28) (119/76 - 159/99)  RR: 18 (2018 23:28) (16 - 20)  SpO2: 95% (2018 23:28) (95% - 95%)    Abdomen: Soft, nontender, no distension, firm uterine fundus  Ext: No DVT signs, warm extremities    LABS:                        14.1   11.2  )-----------( 163      ( 2018 10:29 )             41.5

## 2018-06-21 NOTE — PROGRESS NOTE ADULT - PROBLEM SELECTOR PLAN 1
Continue the current pain medication.   Encourage ambulation and a regular diet.   Discharge according to the normal criteria.
Continue the current pain medication.   Encourage ambulation and a regular diet.   Discharge according to the normal criteria.

## 2018-06-23 LAB
RUBV IGG SER-ACNC: 1.9 INDEX — SIGNIFICANT CHANGE UP
RUBV IGG SER-IMP: POSITIVE — SIGNIFICANT CHANGE UP

## 2018-06-25 DIAGNOSIS — O35.1XX1 MATERNAL CARE FOR (SUSPECTED) CHROMOSOMAL ABNORMALITY IN FETUS, FETUS 1: ICD-10-CM

## 2018-06-25 DIAGNOSIS — Z3A.36 36 WEEKS GESTATION OF PREGNANCY: ICD-10-CM

## 2018-06-25 DIAGNOSIS — Z34.93 ENCOUNTER FOR SUPERVISION OF NORMAL PREGNANCY, UNSPECIFIED, THIRD TRIMESTER: ICD-10-CM

## 2018-06-25 DIAGNOSIS — Z3A.38 38 WEEKS GESTATION OF PREGNANCY: ICD-10-CM

## 2018-06-25 DIAGNOSIS — Z3A.37 37 WEEKS GESTATION OF PREGNANCY: ICD-10-CM

## 2018-06-28 ENCOUNTER — APPOINTMENT (OUTPATIENT)
Dept: OBGYN | Facility: CLINIC | Age: 28
End: 2018-06-28

## 2018-07-06 ENCOUNTER — APPOINTMENT (OUTPATIENT)
Dept: OBGYN | Facility: CLINIC | Age: 28
End: 2018-07-06
Payer: MEDICAID

## 2018-07-06 VITALS
DIASTOLIC BLOOD PRESSURE: 83 MMHG | SYSTOLIC BLOOD PRESSURE: 118 MMHG | HEART RATE: 82 BPM | WEIGHT: 195 LBS | HEIGHT: 62 IN | BODY MASS INDEX: 35.88 KG/M2

## 2018-07-06 PROCEDURE — 0503F POSTPARTUM CARE VISIT: CPT

## 2018-07-27 PROBLEM — O35.1XX1 CHROMOSOMAL ABNORMALITY IN FETUS AFFECTING CARE OF MOTHER, FETUS 1: Status: RESOLVED | Noted: 2017-12-15 | Resolved: 2018-07-27

## 2018-08-06 ENCOUNTER — APPOINTMENT (OUTPATIENT)
Dept: OBGYN | Facility: CLINIC | Age: 28
End: 2018-08-06

## 2018-08-06 ENCOUNTER — APPOINTMENT (OUTPATIENT)
Dept: OBGYN | Facility: CLINIC | Age: 28
End: 2018-08-06
Payer: MEDICAID

## 2018-08-06 VITALS
HEART RATE: 80 BPM | BODY MASS INDEX: 36.25 KG/M2 | SYSTOLIC BLOOD PRESSURE: 112 MMHG | HEIGHT: 62 IN | DIASTOLIC BLOOD PRESSURE: 72 MMHG | WEIGHT: 197 LBS

## 2018-08-06 DIAGNOSIS — Z00.00 ENCOUNTER FOR GENERAL ADULT MEDICAL EXAMINATION W/OUT ABNORMAL FINDINGS: ICD-10-CM

## 2018-08-06 DIAGNOSIS — Z78.9 OTHER SPECIFIED HEALTH STATUS: ICD-10-CM

## 2018-08-06 PROCEDURE — 0503F POSTPARTUM CARE VISIT: CPT

## 2018-08-08 LAB
C TRACH RRNA SPEC QL NAA+PROBE: NOT DETECTED
HPV HIGH+LOW RISK DNA PNL CVX: NOT DETECTED
N GONORRHOEA RRNA SPEC QL NAA+PROBE: NOT DETECTED
SOURCE TP AMPLIFICATION: NORMAL

## 2018-08-10 LAB — CYTOLOGY CVX/VAG DOC THIN PREP: NORMAL

## 2019-01-24 ENCOUNTER — APPOINTMENT (OUTPATIENT)
Dept: OBGYN | Facility: CLINIC | Age: 29
End: 2019-01-24
Payer: MEDICAID

## 2019-01-24 VITALS
BODY MASS INDEX: 37.36 KG/M2 | HEART RATE: 91 BPM | WEIGHT: 203 LBS | HEIGHT: 62 IN | SYSTOLIC BLOOD PRESSURE: 109 MMHG | DIASTOLIC BLOOD PRESSURE: 73 MMHG

## 2019-01-24 LAB
HCG UR QL: POSITIVE
QUALITY CONTROL: YES

## 2019-01-24 PROCEDURE — 81025 URINE PREGNANCY TEST: CPT

## 2019-01-24 PROCEDURE — 99214 OFFICE O/P EST MOD 30 MIN: CPT

## 2019-01-24 NOTE — PHYSICAL EXAM
[Awake] : awake [Alert] : alert [Acute Distress] : no acute distress [Mass] : no breast mass [Nipple Discharge] : no nipple discharge [Axillary LAD] : no axillary lymphadenopathy [Soft] : soft [Tender] : non tender [Oriented x3] : oriented to person, place, and time [Normal] : uterus [No Bleeding] : there was no active vaginal bleeding [Enlarged ___ wks] : enlarged [unfilled] ~Uweeks [Uterine Adnexae] : were not tender and not enlarged

## 2019-02-04 ENCOUNTER — APPOINTMENT (OUTPATIENT)
Dept: ANTEPARTUM | Facility: CLINIC | Age: 29
End: 2019-02-04
Payer: MEDICAID

## 2019-02-04 ENCOUNTER — ASOB RESULT (OUTPATIENT)
Age: 29
End: 2019-02-04

## 2019-02-04 PROCEDURE — 76801 OB US < 14 WKS SINGLE FETUS: CPT

## 2019-02-19 ENCOUNTER — NON-APPOINTMENT (OUTPATIENT)
Age: 29
End: 2019-02-19

## 2019-02-19 ENCOUNTER — APPOINTMENT (OUTPATIENT)
Dept: OBGYN | Facility: CLINIC | Age: 29
End: 2019-02-19
Payer: MEDICAID

## 2019-02-19 VITALS
WEIGHT: 204.13 LBS | BODY MASS INDEX: 37.56 KG/M2 | HEART RATE: 115 BPM | DIASTOLIC BLOOD PRESSURE: 84 MMHG | SYSTOLIC BLOOD PRESSURE: 122 MMHG | HEIGHT: 62 IN

## 2019-02-19 PROCEDURE — 36415 COLL VENOUS BLD VENIPUNCTURE: CPT

## 2019-02-19 PROCEDURE — 0501F PRENATAL FLOW SHEET: CPT

## 2019-02-21 LAB
ABO + RH PNL BLD: NORMAL
APPEARANCE: CLEAR
BACTERIA UR CULT: NORMAL
BACTERIA: NEGATIVE
BASOPHILS # BLD AUTO: 0.01 K/UL
BASOPHILS NFR BLD AUTO: 0.1 %
BILIRUBIN URINE: NEGATIVE
BLD GP AB SCN SERPL QL: NORMAL
BLOOD URINE: NEGATIVE
COLOR: YELLOW
EOSINOPHIL # BLD AUTO: 0.13 K/UL
EOSINOPHIL NFR BLD AUTO: 1.3 %
GLUCOSE QUALITATIVE U: NEGATIVE
HBA1C MFR BLD HPLC: 5.5 %
HCT VFR BLD CALC: 41.6 %
HGB BLD-MCNC: 13.1 G/DL
HIV1+2 AB SPEC QL IA.RAPID: NONREACTIVE
HYALINE CASTS: 2 /LPF
IMM GRANULOCYTES NFR BLD AUTO: 0.3 %
KETONES URINE: NEGATIVE
LEUKOCYTE ESTERASE URINE: NEGATIVE
LYMPHOCYTES # BLD AUTO: 1.66 K/UL
LYMPHOCYTES NFR BLD AUTO: 17 %
MAN DIFF?: NORMAL
MCHC RBC-ENTMCNC: 30.1 PG
MCHC RBC-ENTMCNC: 31.5 GM/DL
MCV RBC AUTO: 95.6 FL
MEV IGG FLD QL IA: 14.2 AU/ML
MEV IGG+IGM SER-IMP: NEGATIVE
MICROSCOPIC-UA: NORMAL
MONOCYTES # BLD AUTO: 0.54 K/UL
MONOCYTES NFR BLD AUTO: 5.5 %
NEUTROPHILS # BLD AUTO: 7.42 K/UL
NEUTROPHILS NFR BLD AUTO: 75.8 %
NITRITE URINE: NEGATIVE
PH URINE: 7
PLATELET # BLD AUTO: 198 K/UL
PROTEIN URINE: NORMAL
RBC # BLD: 4.35 M/UL
RBC # FLD: 13.2 %
RED BLOOD CELLS URINE: 4 /HPF
SPECIFIC GRAVITY URINE: 1.03
SQUAMOUS EPITHELIAL CELLS: 8 /HPF
T4 SERPL-MCNC: 10.3 UG/DL
TSH SERPL-ACNC: 1.57 UIU/ML
UROBILINOGEN URINE: NORMAL
WBC # FLD AUTO: 9.79 K/UL
WHITE BLOOD CELLS URINE: 1 /HPF

## 2019-02-22 LAB
B19V IGG SER QL IA: 0.3 INDEX
B19V IGG+IGM SER-IMP: NEGATIVE
B19V IGG+IGM SER-IMP: NORMAL
B19V IGM FLD-ACNC: 0.2 INDEX
B19V IGM SER-ACNC: NEGATIVE
CMV IGG SERPL QL: >10 U/ML
CMV IGG SERPL-IMP: POSITIVE
HBV SURFACE AG SER QL: NONREACTIVE
HCV AB SER QL: NONREACTIVE
HCV S/CO RATIO: 0.2 S/CO
M TB IFN-G BLD-IMP: NEGATIVE
MUV AB SER-ACNC: POSITIVE
MUV IGG SER QL IA: 11.3 AU/ML
QUANTIFERON TB PLUS MITOGEN MINUS NIL: 8.32 IU/ML
QUANTIFERON TB PLUS NIL: 0.02 IU/ML
QUANTIFERON TB PLUS TB1 MINUS NIL: 0 IU/ML
QUANTIFERON TB PLUS TB2 MINUS NIL: 0 IU/ML
RUBV IGG FLD-ACNC: 1.9 INDEX
RUBV IGG SER-IMP: POSITIVE
T GONDII AB SER-IMP: NEGATIVE
T GONDII IGG SER QL: <3 IU/ML
T PALLIDUM AB SER QL IA: NEGATIVE
VZV AB TITR SER: POSITIVE
VZV IGG SER IF-ACNC: 196 INDEX

## 2019-02-25 LAB
HGB A MFR BLD: 97.4 %
HGB A2 MFR BLD: 2.6 %
HGB FRACT BLD-IMP: NORMAL

## 2019-03-12 DIAGNOSIS — Z3A.13 13 WEEKS GESTATION OF PREGNANCY: ICD-10-CM

## 2019-03-14 ENCOUNTER — APPOINTMENT (OUTPATIENT)
Dept: OBGYN | Facility: CLINIC | Age: 29
End: 2019-03-14
Payer: MEDICAID

## 2019-03-14 ENCOUNTER — NON-APPOINTMENT (OUTPATIENT)
Age: 29
End: 2019-03-14

## 2019-03-14 VITALS
HEIGHT: 62 IN | HEART RATE: 92 BPM | WEIGHT: 203 LBS | SYSTOLIC BLOOD PRESSURE: 102 MMHG | BODY MASS INDEX: 37.36 KG/M2 | DIASTOLIC BLOOD PRESSURE: 71 MMHG

## 2019-03-14 PROCEDURE — 0502F SUBSEQUENT PRENATAL CARE: CPT

## 2019-04-03 PROBLEM — Z3A.16 16 WEEKS GESTATION OF PREGNANCY: Status: RESOLVED | Noted: 2019-03-14 | Resolved: 2019-04-03

## 2019-04-05 ENCOUNTER — NON-APPOINTMENT (OUTPATIENT)
Age: 29
End: 2019-04-05

## 2019-04-05 ENCOUNTER — APPOINTMENT (OUTPATIENT)
Dept: OBGYN | Facility: CLINIC | Age: 29
End: 2019-04-05
Payer: MEDICAID

## 2019-04-05 VITALS
SYSTOLIC BLOOD PRESSURE: 113 MMHG | WEIGHT: 205.38 LBS | HEART RATE: 92 BPM | HEIGHT: 62 IN | BODY MASS INDEX: 37.79 KG/M2 | DIASTOLIC BLOOD PRESSURE: 75 MMHG

## 2019-04-05 DIAGNOSIS — Z3A.16 16 WEEKS GESTATION OF PREGNANCY: ICD-10-CM

## 2019-04-05 PROCEDURE — 0502F SUBSEQUENT PRENATAL CARE: CPT

## 2019-04-08 LAB
2ND TRIMESTER DATA: NORMAL
AFP PNL SERPL: NORMAL
AFP SERPL-ACNC: NORMAL
B-HCG FREE SERPL-MCNC: NORMAL
CLINICAL BIOCHEMIST REVIEW: NORMAL
INHIBIN A SERPL-MCNC: NORMAL
NOTES NTD: NORMAL
U ESTRIOL SERPL-SCNC: NORMAL

## 2019-04-11 ENCOUNTER — APPOINTMENT (OUTPATIENT)
Dept: ANTEPARTUM | Facility: CLINIC | Age: 29
End: 2019-04-11
Payer: MEDICAID

## 2019-04-11 ENCOUNTER — ASOB RESULT (OUTPATIENT)
Age: 29
End: 2019-04-11

## 2019-04-11 PROCEDURE — 76811 OB US DETAILED SNGL FETUS: CPT

## 2019-05-03 ENCOUNTER — NON-APPOINTMENT (OUTPATIENT)
Age: 29
End: 2019-05-03

## 2019-05-03 ENCOUNTER — APPOINTMENT (OUTPATIENT)
Dept: OBGYN | Facility: CLINIC | Age: 29
End: 2019-05-03
Payer: MEDICAID

## 2019-05-03 VITALS
HEART RATE: 111 BPM | HEIGHT: 62 IN | DIASTOLIC BLOOD PRESSURE: 76 MMHG | WEIGHT: 207 LBS | SYSTOLIC BLOOD PRESSURE: 113 MMHG | BODY MASS INDEX: 38.09 KG/M2

## 2019-05-03 PROCEDURE — 0502F SUBSEQUENT PRENATAL CARE: CPT

## 2019-05-17 ENCOUNTER — APPOINTMENT (OUTPATIENT)
Dept: OBGYN | Facility: CLINIC | Age: 29
End: 2019-05-17
Payer: MEDICAID

## 2019-05-17 ENCOUNTER — NON-APPOINTMENT (OUTPATIENT)
Age: 29
End: 2019-05-17

## 2019-05-17 VITALS
DIASTOLIC BLOOD PRESSURE: 73 MMHG | SYSTOLIC BLOOD PRESSURE: 121 MMHG | HEIGHT: 62 IN | WEIGHT: 208.38 LBS | BODY MASS INDEX: 38.35 KG/M2

## 2019-05-17 PROCEDURE — 36415 COLL VENOUS BLD VENIPUNCTURE: CPT

## 2019-05-17 PROCEDURE — 0502F SUBSEQUENT PRENATAL CARE: CPT

## 2019-05-21 LAB
BASOPHILS # BLD AUTO: 0.02 K/UL
BASOPHILS NFR BLD AUTO: 0.2 %
BLD GP AB SCN SERPL QL: NORMAL
EOSINOPHIL # BLD AUTO: 0.07 K/UL
EOSINOPHIL NFR BLD AUTO: 0.7 %
GLUCOSE 1H P 50 G GLC PO SERPL-MCNC: 115 MG/DL
HCT VFR BLD CALC: 40.8 %
HGB BLD-MCNC: 13.1 G/DL
IMM GRANULOCYTES NFR BLD AUTO: 0.3 %
LYMPHOCYTES # BLD AUTO: 1.45 K/UL
LYMPHOCYTES NFR BLD AUTO: 15.1 %
MAN DIFF?: NORMAL
MCHC RBC-ENTMCNC: 29.8 PG
MCHC RBC-ENTMCNC: 32.1 GM/DL
MCV RBC AUTO: 92.9 FL
MONOCYTES # BLD AUTO: 0.48 K/UL
MONOCYTES NFR BLD AUTO: 5 %
NEUTROPHILS # BLD AUTO: 7.56 K/UL
NEUTROPHILS NFR BLD AUTO: 78.7 %
PLATELET # BLD AUTO: 213 K/UL
RBC # BLD: 4.39 M/UL
RBC # FLD: 13.3 %
WBC # FLD AUTO: 9.61 K/UL

## 2019-06-05 DIAGNOSIS — Z3A.23 23 WEEKS GESTATION OF PREGNANCY: ICD-10-CM

## 2019-06-05 DIAGNOSIS — Z3A.19 19 WEEKS GESTATION OF PREGNANCY: ICD-10-CM

## 2019-06-05 DIAGNOSIS — Z3A.25 25 WEEKS GESTATION OF PREGNANCY: ICD-10-CM

## 2019-06-10 ENCOUNTER — NON-APPOINTMENT (OUTPATIENT)
Age: 29
End: 2019-06-10

## 2019-06-10 ENCOUNTER — APPOINTMENT (OUTPATIENT)
Dept: OBGYN | Facility: CLINIC | Age: 29
End: 2019-06-10
Payer: MEDICAID

## 2019-06-10 ENCOUNTER — APPOINTMENT (OUTPATIENT)
Dept: OBGYN | Facility: CLINIC | Age: 29
End: 2019-06-10

## 2019-06-10 VITALS
HEART RATE: 97 BPM | BODY MASS INDEX: 38.64 KG/M2 | HEIGHT: 62 IN | WEIGHT: 210 LBS | SYSTOLIC BLOOD PRESSURE: 122 MMHG | DIASTOLIC BLOOD PRESSURE: 74 MMHG

## 2019-06-10 PROCEDURE — 0502F SUBSEQUENT PRENATAL CARE: CPT

## 2019-06-10 RX ORDER — HUMAN RHO(D) IMMUNE GLOBULIN 300 UG/1
1500 INJECTION, SOLUTION INTRAMUSCULAR
Refills: 0 | Status: COMPLETED | OUTPATIENT
Start: 2019-06-10

## 2019-06-10 RX ADMIN — HUMAN RHO(D) IMMUNE GLOBULIN 0 UNIT: 300 INJECTION, SOLUTION INTRAMUSCULAR at 00:00

## 2019-06-24 ENCOUNTER — NON-APPOINTMENT (OUTPATIENT)
Age: 29
End: 2019-06-24

## 2019-06-24 ENCOUNTER — APPOINTMENT (OUTPATIENT)
Dept: OBGYN | Facility: CLINIC | Age: 29
End: 2019-06-24
Payer: MEDICAID

## 2019-06-24 VITALS
WEIGHT: 210 LBS | DIASTOLIC BLOOD PRESSURE: 74 MMHG | BODY MASS INDEX: 38.64 KG/M2 | SYSTOLIC BLOOD PRESSURE: 119 MMHG | HEART RATE: 112 BPM | HEIGHT: 62 IN

## 2019-06-24 PROCEDURE — 0502F SUBSEQUENT PRENATAL CARE: CPT

## 2019-07-05 DIAGNOSIS — Z3A.31 31 WEEKS GESTATION OF PREGNANCY: ICD-10-CM

## 2019-07-05 DIAGNOSIS — Z3A.28 28 WEEKS GESTATION OF PREGNANCY: ICD-10-CM

## 2019-07-08 ENCOUNTER — APPOINTMENT (OUTPATIENT)
Dept: OBGYN | Facility: CLINIC | Age: 29
End: 2019-07-08

## 2019-08-05 ENCOUNTER — APPOINTMENT (OUTPATIENT)
Dept: OBGYN | Facility: CLINIC | Age: 29
End: 2019-08-05
Payer: MEDICAID

## 2019-08-05 ENCOUNTER — NON-APPOINTMENT (OUTPATIENT)
Age: 29
End: 2019-08-05

## 2019-08-05 VITALS
HEIGHT: 62 IN | WEIGHT: 214 LBS | DIASTOLIC BLOOD PRESSURE: 76 MMHG | SYSTOLIC BLOOD PRESSURE: 113 MMHG | BODY MASS INDEX: 39.38 KG/M2 | HEART RATE: 103 BPM

## 2019-08-05 PROCEDURE — 0502F SUBSEQUENT PRENATAL CARE: CPT

## 2019-08-07 DIAGNOSIS — Z3A.36 36 WEEKS GESTATION OF PREGNANCY: ICD-10-CM

## 2019-08-12 ENCOUNTER — APPOINTMENT (OUTPATIENT)
Dept: OBGYN | Facility: CLINIC | Age: 29
End: 2019-08-12
Payer: MEDICAID

## 2019-08-12 ENCOUNTER — NON-APPOINTMENT (OUTPATIENT)
Age: 29
End: 2019-08-12

## 2019-08-12 VITALS
SYSTOLIC BLOOD PRESSURE: 121 MMHG | BODY MASS INDEX: 39.56 KG/M2 | HEIGHT: 62 IN | HEART RATE: 105 BPM | WEIGHT: 215 LBS | DIASTOLIC BLOOD PRESSURE: 79 MMHG

## 2019-08-12 PROCEDURE — 90715 TDAP VACCINE 7 YRS/> IM: CPT

## 2019-08-12 PROCEDURE — 0502F SUBSEQUENT PRENATAL CARE: CPT

## 2019-08-12 PROCEDURE — 90471 IMMUNIZATION ADMIN: CPT

## 2019-08-19 ENCOUNTER — NON-APPOINTMENT (OUTPATIENT)
Age: 29
End: 2019-08-19

## 2019-08-19 ENCOUNTER — APPOINTMENT (OUTPATIENT)
Dept: OBGYN | Facility: CLINIC | Age: 29
End: 2019-08-19
Payer: MEDICAID

## 2019-08-19 VITALS
BODY MASS INDEX: 39.75 KG/M2 | HEART RATE: 106 BPM | DIASTOLIC BLOOD PRESSURE: 84 MMHG | SYSTOLIC BLOOD PRESSURE: 126 MMHG | WEIGHT: 217.31 LBS

## 2019-08-19 PROCEDURE — 0502F SUBSEQUENT PRENATAL CARE: CPT

## 2019-08-20 ENCOUNTER — OUTPATIENT (OUTPATIENT)
Dept: OUTPATIENT SERVICES | Facility: HOSPITAL | Age: 29
LOS: 1 days | End: 2019-08-20
Payer: MEDICAID

## 2019-08-20 VITALS
DIASTOLIC BLOOD PRESSURE: 69 MMHG | HEART RATE: 88 BPM | TEMPERATURE: 98 F | SYSTOLIC BLOOD PRESSURE: 120 MMHG | RESPIRATION RATE: 18 BRPM

## 2019-08-20 VITALS — TEMPERATURE: 98 F | RESPIRATION RATE: 18 BRPM

## 2019-08-20 DIAGNOSIS — O47.1 FALSE LABOR AT OR AFTER 37 COMPLETED WEEKS OF GESTATION: ICD-10-CM

## 2019-08-20 PROCEDURE — 59025 FETAL NON-STRESS TEST: CPT

## 2019-08-20 PROCEDURE — 59050 FETAL MONITOR W/REPORT: CPT

## 2019-08-20 PROCEDURE — 59025 FETAL NON-STRESS TEST: CPT | Mod: 26

## 2019-08-20 PROCEDURE — G0463: CPT

## 2019-08-20 NOTE — OB PROVIDER TRIAGE NOTE - HISTORY OF PRESENT ILLNESS
29yo  at 39w presents after passing her mucus plug and feeling contractions q10m which began at 1300 today.  Denies vaginal bleeding or loss of fluid. Endorses fetal movement.  No known complications with this pregnancy.    ObHx:  2018, FT, , complicated by pre-eclampsia requiring induction, 6lb2oz, same father for this pregnancy  PMHx/PSHx: Asthma (last inhaler use 2months ago, never intubated or hospitalized)  Meds: PNVs, Albuterol PRN  NKDA    FHT: Cat 1, not yet reactive  Cosmos: q3-7m, irregular  SVE: /-3    29yo  at 39w here for r/o labor.  Not in labor.  DC pending reactive tracing.    d/w Dr. Riddle

## 2019-08-24 ENCOUNTER — INPATIENT (INPATIENT)
Facility: HOSPITAL | Age: 29
LOS: 1 days | Discharge: HOME | End: 2019-08-26
Attending: OBSTETRICS & GYNECOLOGY | Admitting: OBSTETRICS & GYNECOLOGY
Payer: MEDICAID

## 2019-08-24 ENCOUNTER — TRANSCRIPTION ENCOUNTER (OUTPATIENT)
Age: 29
End: 2019-08-24

## 2019-08-24 VITALS
HEIGHT: 63 IN | HEART RATE: 86 BPM | RESPIRATION RATE: 18 BRPM | SYSTOLIC BLOOD PRESSURE: 120 MMHG | TEMPERATURE: 98 F | WEIGHT: 218.04 LBS | DIASTOLIC BLOOD PRESSURE: 72 MMHG

## 2019-08-24 DIAGNOSIS — O26.893 OTHER SPECIFIED PREGNANCY RELATED CONDITIONS, THIRD TRIMESTER: ICD-10-CM

## 2019-08-24 DIAGNOSIS — Z3A.39 39 WEEKS GESTATION OF PREGNANCY: ICD-10-CM

## 2019-08-24 LAB
APPEARANCE UR: CLEAR — SIGNIFICANT CHANGE UP
BACTERIA # UR AUTO: ABNORMAL
BASOPHILS # BLD AUTO: 0.01 K/UL — SIGNIFICANT CHANGE UP (ref 0–0.2)
BASOPHILS NFR BLD AUTO: 0.1 % — SIGNIFICANT CHANGE UP (ref 0–2)
BILIRUB UR-MCNC: NEGATIVE — SIGNIFICANT CHANGE UP
BLD GP AB SCN SERPL QL: SIGNIFICANT CHANGE UP
COLOR SPEC: YELLOW — SIGNIFICANT CHANGE UP
DIFF PNL FLD: NEGATIVE — SIGNIFICANT CHANGE UP
EOSINOPHIL # BLD AUTO: 0.11 K/UL — SIGNIFICANT CHANGE UP (ref 0–0.5)
EOSINOPHIL NFR BLD AUTO: 1.1 % — SIGNIFICANT CHANGE UP (ref 0–6)
EPI CELLS # UR: SIGNIFICANT CHANGE UP
FETAL SCREEN: SIGNIFICANT CHANGE UP
GLUCOSE UR QL: NEGATIVE MG/DL — SIGNIFICANT CHANGE UP
HCT VFR BLD CALC: 41.3 % — SIGNIFICANT CHANGE UP (ref 34.5–45)
HGB BLD-MCNC: 14 G/DL — SIGNIFICANT CHANGE UP (ref 11.5–15.5)
IMM GRANULOCYTES NFR BLD AUTO: 0.4 % — SIGNIFICANT CHANGE UP (ref 0–1.5)
KETONES UR-MCNC: NEGATIVE — SIGNIFICANT CHANGE UP
LEUKOCYTE ESTERASE UR-ACNC: ABNORMAL
LYMPHOCYTES # BLD AUTO: 2.57 K/UL — SIGNIFICANT CHANGE UP (ref 1–3.3)
LYMPHOCYTES # BLD AUTO: 25 % — SIGNIFICANT CHANGE UP (ref 13–44)
MCHC RBC-ENTMCNC: 30.6 PG — SIGNIFICANT CHANGE UP (ref 27–34)
MCHC RBC-ENTMCNC: 33.9 GM/DL — SIGNIFICANT CHANGE UP (ref 32–36)
MCV RBC AUTO: 90.2 FL — SIGNIFICANT CHANGE UP (ref 80–100)
MONOCYTES # BLD AUTO: 0.72 K/UL — SIGNIFICANT CHANGE UP (ref 0–0.9)
MONOCYTES NFR BLD AUTO: 7 % — SIGNIFICANT CHANGE UP (ref 2–14)
NEUTROPHILS # BLD AUTO: 6.84 K/UL — SIGNIFICANT CHANGE UP (ref 1.8–7.4)
NEUTROPHILS NFR BLD AUTO: 66.4 % — SIGNIFICANT CHANGE UP (ref 43–77)
NITRITE UR-MCNC: NEGATIVE — SIGNIFICANT CHANGE UP
PH UR: 6.5 — SIGNIFICANT CHANGE UP (ref 5–8)
PLATELET # BLD AUTO: 177 K/UL — SIGNIFICANT CHANGE UP (ref 150–400)
PROT UR-MCNC: NEGATIVE MG/DL — SIGNIFICANT CHANGE UP
RBC # BLD: 4.58 M/UL — SIGNIFICANT CHANGE UP (ref 3.8–5.2)
RBC # FLD: 13.4 % — SIGNIFICANT CHANGE UP (ref 10.3–14.5)
RBC CASTS # UR COMP ASSIST: SIGNIFICANT CHANGE UP /HPF (ref 0–4)
SP GR SPEC: 1 — LOW (ref 1.01–1.02)
T PALLIDUM AB TITR SER: NEGATIVE — SIGNIFICANT CHANGE UP
UROBILINOGEN FLD QL: NEGATIVE MG/DL — SIGNIFICANT CHANGE UP
WBC # BLD: 10.29 K/UL — SIGNIFICANT CHANGE UP (ref 3.8–10.5)
WBC # FLD AUTO: 10.29 K/UL — SIGNIFICANT CHANGE UP (ref 3.8–10.5)
WBC UR QL: SIGNIFICANT CHANGE UP

## 2019-08-24 PROCEDURE — 59409 OBSTETRICAL CARE: CPT | Mod: U9

## 2019-08-24 RX ORDER — MAGNESIUM HYDROXIDE 400 MG/1
30 TABLET, CHEWABLE ORAL
Refills: 0 | Status: DISCONTINUED | OUTPATIENT
Start: 2019-08-24 | End: 2019-08-26

## 2019-08-24 RX ORDER — LANOLIN
1 OINTMENT (GRAM) TOPICAL EVERY 6 HOURS
Refills: 0 | Status: DISCONTINUED | OUTPATIENT
Start: 2019-08-24 | End: 2019-08-26

## 2019-08-24 RX ORDER — KETOROLAC TROMETHAMINE 30 MG/ML
30 SYRINGE (ML) INJECTION ONCE
Refills: 0 | Status: DISCONTINUED | OUTPATIENT
Start: 2019-08-24 | End: 2019-08-24

## 2019-08-24 RX ORDER — HYDROCORTISONE 1 %
1 OINTMENT (GRAM) TOPICAL EVERY 6 HOURS
Refills: 0 | Status: DISCONTINUED | OUTPATIENT
Start: 2019-08-24 | End: 2019-08-26

## 2019-08-24 RX ORDER — DIPHENHYDRAMINE HCL 50 MG
25 CAPSULE ORAL EVERY 6 HOURS
Refills: 0 | Status: DISCONTINUED | OUTPATIENT
Start: 2019-08-24 | End: 2019-08-26

## 2019-08-24 RX ORDER — SODIUM CHLORIDE 9 MG/ML
1000 INJECTION, SOLUTION INTRAVENOUS ONCE
Refills: 0 | Status: COMPLETED | OUTPATIENT
Start: 2019-08-24 | End: 2019-08-24

## 2019-08-24 RX ORDER — GLYCERIN ADULT
1 SUPPOSITORY, RECTAL RECTAL AT BEDTIME
Refills: 0 | Status: DISCONTINUED | OUTPATIENT
Start: 2019-08-24 | End: 2019-08-26

## 2019-08-24 RX ORDER — ACETAMINOPHEN 500 MG
975 TABLET ORAL
Refills: 0 | Status: DISCONTINUED | OUTPATIENT
Start: 2019-08-24 | End: 2019-08-26

## 2019-08-24 RX ORDER — TETANUS TOXOID, REDUCED DIPHTHERIA TOXOID AND ACELLULAR PERTUSSIS VACCINE, ADSORBED 5; 2.5; 8; 8; 2.5 [IU]/.5ML; [IU]/.5ML; UG/.5ML; UG/.5ML; UG/.5ML
0.5 SUSPENSION INTRAMUSCULAR ONCE
Refills: 0 | Status: DISCONTINUED | OUTPATIENT
Start: 2019-08-24 | End: 2019-08-26

## 2019-08-24 RX ORDER — OXYTOCIN 10 UNIT/ML
4 VIAL (ML) INJECTION
Qty: 30 | Refills: 0 | Status: DISCONTINUED | OUTPATIENT
Start: 2019-08-24 | End: 2019-08-24

## 2019-08-24 RX ORDER — DIBUCAINE 1 %
1 OINTMENT (GRAM) RECTAL EVERY 6 HOURS
Refills: 0 | Status: DISCONTINUED | OUTPATIENT
Start: 2019-08-24 | End: 2019-08-26

## 2019-08-24 RX ORDER — PRAMOXINE HYDROCHLORIDE 150 MG/15G
1 AEROSOL, FOAM RECTAL EVERY 4 HOURS
Refills: 0 | Status: DISCONTINUED | OUTPATIENT
Start: 2019-08-24 | End: 2019-08-26

## 2019-08-24 RX ORDER — OXYTOCIN 10 UNIT/ML
333.33 VIAL (ML) INJECTION
Qty: 20 | Refills: 0 | Status: COMPLETED | OUTPATIENT
Start: 2019-08-24 | End: 2019-08-24

## 2019-08-24 RX ORDER — SODIUM CHLORIDE 9 MG/ML
1000 INJECTION, SOLUTION INTRAVENOUS
Refills: 0 | Status: DISCONTINUED | OUTPATIENT
Start: 2019-08-24 | End: 2019-08-24

## 2019-08-24 RX ORDER — AMPICILLIN TRIHYDRATE 250 MG
1 CAPSULE ORAL EVERY 4 HOURS
Refills: 0 | Status: DISCONTINUED | OUTPATIENT
Start: 2019-08-24 | End: 2019-08-24

## 2019-08-24 RX ORDER — BENZOCAINE 10 %
1 GEL (GRAM) MUCOUS MEMBRANE EVERY 6 HOURS
Refills: 0 | Status: DISCONTINUED | OUTPATIENT
Start: 2019-08-24 | End: 2019-08-26

## 2019-08-24 RX ORDER — OXYTOCIN 10 UNIT/ML
333.33 VIAL (ML) INJECTION
Qty: 20 | Refills: 0 | Status: DISCONTINUED | OUTPATIENT
Start: 2019-08-24 | End: 2019-08-26

## 2019-08-24 RX ORDER — IBUPROFEN 200 MG
600 TABLET ORAL EVERY 6 HOURS
Refills: 0 | Status: DISCONTINUED | OUTPATIENT
Start: 2019-08-24 | End: 2019-08-26

## 2019-08-24 RX ORDER — SODIUM CHLORIDE 9 MG/ML
1000 INJECTION, SOLUTION INTRAVENOUS
Refills: 0 | Status: DISCONTINUED | OUTPATIENT
Start: 2019-08-24 | End: 2019-08-26

## 2019-08-24 RX ORDER — OXYCODONE HYDROCHLORIDE 5 MG/1
5 TABLET ORAL ONCE
Refills: 0 | Status: DISCONTINUED | OUTPATIENT
Start: 2019-08-24 | End: 2019-08-26

## 2019-08-24 RX ORDER — OXYCODONE HYDROCHLORIDE 5 MG/1
5 TABLET ORAL
Refills: 0 | Status: DISCONTINUED | OUTPATIENT
Start: 2019-08-24 | End: 2019-08-26

## 2019-08-24 RX ORDER — SODIUM CHLORIDE 9 MG/ML
3 INJECTION INTRAMUSCULAR; INTRAVENOUS; SUBCUTANEOUS EVERY 8 HOURS
Refills: 0 | Status: DISCONTINUED | OUTPATIENT
Start: 2019-08-24 | End: 2019-08-26

## 2019-08-24 RX ORDER — AMPICILLIN TRIHYDRATE 250 MG
2 CAPSULE ORAL ONCE
Refills: 0 | Status: COMPLETED | OUTPATIENT
Start: 2019-08-24 | End: 2019-08-24

## 2019-08-24 RX ORDER — DOCUSATE SODIUM 100 MG
100 CAPSULE ORAL
Refills: 0 | Status: DISCONTINUED | OUTPATIENT
Start: 2019-08-24 | End: 2019-08-26

## 2019-08-24 RX ORDER — AER TRAVELER 0.5 G/1
1 SOLUTION RECTAL; TOPICAL EVERY 4 HOURS
Refills: 0 | Status: DISCONTINUED | OUTPATIENT
Start: 2019-08-24 | End: 2019-08-26

## 2019-08-24 RX ORDER — IBUPROFEN 200 MG
600 TABLET ORAL EVERY 6 HOURS
Refills: 0 | Status: COMPLETED | OUTPATIENT
Start: 2019-08-24 | End: 2020-07-22

## 2019-08-24 RX ORDER — CITRIC ACID/SODIUM CITRATE 300-500 MG
30 SOLUTION, ORAL ORAL ONCE
Refills: 0 | Status: COMPLETED | OUTPATIENT
Start: 2019-08-24 | End: 2019-08-24

## 2019-08-24 RX ORDER — SIMETHICONE 80 MG/1
80 TABLET, CHEWABLE ORAL EVERY 4 HOURS
Refills: 0 | Status: DISCONTINUED | OUTPATIENT
Start: 2019-08-24 | End: 2019-08-26

## 2019-08-24 RX ADMIN — Medication 30 MILLIGRAM(S): at 12:02

## 2019-08-24 RX ADMIN — Medication 30 MILLIGRAM(S): at 11:47

## 2019-08-24 RX ADMIN — Medication 4 MILLIUNIT(S)/MIN: at 04:44

## 2019-08-24 RX ADMIN — SODIUM CHLORIDE 125 MILLILITER(S): 9 INJECTION, SOLUTION INTRAVENOUS at 03:46

## 2019-08-24 RX ADMIN — Medication 600 MILLIGRAM(S): at 18:53

## 2019-08-24 RX ADMIN — SODIUM CHLORIDE 2000 MILLILITER(S): 9 INJECTION, SOLUTION INTRAVENOUS at 08:20

## 2019-08-24 RX ADMIN — Medication 975 MILLIGRAM(S): at 21:08

## 2019-08-24 RX ADMIN — Medication 1000 MILLIUNIT(S)/MIN: at 10:56

## 2019-08-24 RX ADMIN — Medication 216 GRAM(S): at 04:10

## 2019-08-24 RX ADMIN — Medication 30 MILLILITER(S): at 08:09

## 2019-08-24 RX ADMIN — Medication 600 MILLIGRAM(S): at 17:53

## 2019-08-24 RX ADMIN — Medication 108 GRAM(S): at 08:10

## 2019-08-24 RX ADMIN — SODIUM CHLORIDE 3 MILLILITER(S): 9 INJECTION INTRAMUSCULAR; INTRAVENOUS; SUBCUTANEOUS at 21:08

## 2019-08-24 RX ADMIN — Medication 975 MILLIGRAM(S): at 16:18

## 2019-08-24 RX ADMIN — Medication 975 MILLIGRAM(S): at 15:18

## 2019-08-24 RX ADMIN — Medication 975 MILLIGRAM(S): at 21:45

## 2019-08-24 NOTE — OB PROVIDER H&P - NSHPPHYSICALEXAM_GEN_ALL_CORE
T(C): 36.8 (19 @ 02:15), Max: 36.8 (19 @ 02:15)  HR: 86 (19 @ 02:15) (86 - 86)  BP: 120/72 (19 @ 02:15) (120/72 - 120/72)  RR: 18 (19 @ 02:15) (18 - 18)    Gen: NAD  Abd: softly distended, gravid, + BS   Pelvic:        Tracin/ mod linh/+accels T(C): 36.8 (19 @ 02:15), Max: 36.8 (19 @ 02:15)  HR: 86 (19 @ 02:15) (86 - 86)  BP: 120/72 (19 @ 02:15) (120/72 - 120/72)  RR: 18 (19 @ 02:15) (18 - 18)    Gen: NAD  Abd: softly distended, gravid, + BS   Pelvic:  3/50/-2      Tracin/ mod linh/+accels    Bedside sono: cephalic presentation, posterior placenta

## 2019-08-24 NOTE — DISCHARGE NOTE OB - PATIENT PORTAL LINK FT
You can access the [facility] Patient Portal, offered by [facility], by registering with the following website: [email or web address]

## 2019-08-24 NOTE — OB PROVIDER H&P - NS_ZIKATRAVEL_OBGYN_ALL_OB
After Visit Summary   1/18/2018    Kayleigh Rocha    MRN: [id number]           Patient Information     Date Of Birth          1963        Visit Information        Provider Department      1/18/2018 10:15 AM Pilar Presley APRN CNP [facility]        Today's Diagnoses     No-show for appointment    -  1       Follow-ups after your visit        Your next 10 appointments already scheduled     Apr 12, 2018  4:00 PM CDT   Treatment with ISABEL Johnson   [facility] ([facility])    [address] [facility] [address] [phone]            Apr 16, 2018 10:00 AM CDT   CT SOFT TISSUE NECK W CONTRAST with MGCT1   [facility]    [address] [place] [id number] [phone]           Please bring any scans or X-rays taken at other hospitals, if similar tests were done. Also bring a list of your medicines, including vitamins, minerals and over-the-counter drugs. It is safest to leave personal items at home.  Be sure to tell your doctor:   If you have any allergies.   If there s any chance you are pregnant.   If you are breastfeeding.   If you have any special needs.  You will have contrast for this exam. To prepare:   Do not eat or drink for 2 hours before your exam. If you need to take medicine, you may take it with small sips of water. (We may ask you to take liquid medicine as well.)   The day before your exam, drink extra fluids at least six 8-ounce glasses (unless your doctor tells you to restrict your fluids).  Patients over 70 or patients with diabetes or kidney problems:   If you haven t had a blood test (creatinine test) within the last 30 days, go to your clinic or Diagnostic Imaging Department for this test.  If you have diabetes:   If your kidney function is normal, continue taking your metformin (Avandamet, Glucophage, Glucovance, Metaglip) on the  No day of your exam.   If your kidney function is abnormal, wait 48 hours before restarting this medicine.  Please wear loose clothing, such as a sweat suit or jogging clothes. Avoid snaps, zippers and other metal. We may ask you to undress and put on a hospital gown.  If you have any questions, please call the Imaging Department where you will have your exam.            Apr 16, 2018 10:30 AM CDT   CT CHEST W CONTRAST with MGCT1   [facility] ([facility])    [address] [place] [address] [phone]           Please bring any scans or X-rays taken at other hospitals, if similar tests were done. Also bring a list of your medicines, including vitamins, minerals and over-the-counter drugs. It is safest to leave personal items at home.  Be sure to tell your doctor:   If you have any allergies.   If there s any chance you are pregnant.   If you are breastfeeding.   If you have any special needs.  You will have contrast for this exam. To prepare:   Do not eat or drink for 2 hours before your exam. If you need to take medicine, you may take it with small sips of water. (We may ask you to take liquid medicine as well.)   The day before your exam, drink extra fluids at least six 8-ounce glasses (unless your doctor tells you to restrict your fluids).  Patients over 70 or patients with diabetes or kidney problems:   If you haven t had a blood test (creatinine test) within the last 30 days, go to your clinic or Diagnostic Imaging Department for this test.  If you have diabetes:   If your kidney function is normal, continue taking your metformin (Avandamet, Glucophage, Glucovance, Metaglip) on the day of your exam.   If your kidney function is abnormal, wait 48 hours before restarting this medicine.  Please wear loose clothing, such as a sweat suit or jogging clothes. Avoid snaps, zippers and other metal. We may ask you to undress and put on a hospital gown.  If you have any  questions, please call the Imaging Department where you will have your exam.            Apr 17, 2018  9:00 AM CDT   Return Visit with Lucius Card MD   [facility]    [address] [address] [place] [id number] [phone]            Apr 30, 2018 12:15 PM CDT   LAB with [facility] Critical access hospital ([facility])    [address] [place] [id number] [phone]           Please do not eat 10-12 hours before your appointment if you are coming in fasting for labs on lipids, cholesterol, or glucose (sugar). This does not apply to pregnant women. Water, hot tea and black coffee (with nothing added) are okay. Do not drink other fluids, diet soda or chew gum.            Apr 30, 2018  1:00 PM CDT   Return Visit with Rogelio Hidalgo MD   [facility] ([facility])    [address] [facility] [id number] [phone]              Who to contact     If you have questions or need follow up information about today's clinic visit or your schedule please contact [facility] directly at [phone].  Normal or non-critical lab and imaging results will be communicated to you by MyChart, letter or phone within 4 business days after the clinic has received the results. If you do not hear from us within 7 days, please contact the clinic through MyChart or phone. If you have a critical or abnormal lab result, we will notify you by phone as soon as possible.  Submit refill requests through [employer] or call your pharmacy and they will forward the refill request to us. Please allow 3 business days for your refill to be completed.          Additional Information About Your Visit        [employer] Information     [employer] gives you secure access to your electronic health record. If you see a primary care provider, you can also send messages to your care team and make  appointments. If you have questions, please call your primary care clinic.  If you do not have a primary care provider, please call [phone] and they will assist you.      [employer] is an electronic gateway that provides easy, online access to your medical records. With [employer], you can request a clinic appointment, read your test results, renew a prescription or communicate with your care team.     To access your existing account, please contact your [facility] or call [phone] for assistance.        Care EveryWhere ID     This is your Care EveryWhere ID. This could be used by other organizations to access your [place] medical records  [id number]         Blood Pressure from Last 3 Encounters:   11/03/17 (!) 144/106   10/23/17 126/85   10/16/17 160/90    Weight from Last 3 Encounters:   01/08/18 47.2 kg (104 lb)   11/03/17 46.8 kg (103 lb 2.8 oz)   10/23/17 48.5 kg (107 lb)              Today, you had the following     No orders found for display       Primary Care Provider Office Phone # Fax #    Jose Manuel ARAUZ Stan [phone]       [facility] [address]  [facility] [address]        Equal Access to Services     ABRAN HUNT : Hadii aad ku hadasho Soomaali, waaxda luqadaha, qaybta kaalmada adeegyada, waxay idiin haygaton gabriella gross ladavid sam. So [facility] [phone].    ATENCIÓN: Si habla español, tiene a downing disposición servicios gratuitos de asistencia lingüística. Llame al [phone].    We comply with applicable federal civil rights laws and [place] laws. We do not discriminate on the basis of race, color, national origin, age, disability, sex, sexual orientation, or gender identity.            Thank you!     Thank you for choosing [facility]  for your care. Our goal is always to provide you with excellent care. Hearing back from our patients is one way we can continue to improve our services. Please take a few minutes to complete the  written survey that you may receive in the mail after your visit with us. Thank you!             Your Updated Medication List - Protect others around you: Learn how to safely use, store and throw away your medicines at www.disposemymeds.org.          This list is accurate as of: 1/18/18 11:03 AM.  Always use your most recent med list.                   Brand Name Dispense Instructions for use Diagnosis    albuterol (2.5 MG/3ML) 0.083% neb solution     360 mL    Take 1 vial (2.5 mg) by nebulization every 4 hours as needed for shortness of breath / dyspnea or wheezing    Acute respiratory failure with hypoxia (H)       multivitamins with minerals Liqd liquid     1 Bottle    15 mLs by Per Feeding Tube route daily    Nutritional deficiency       oxyCODONE-acetaminophen 5-325 MG per tablet    PERCOCET    15 tablet    Take 1-2 tablets by mouth every 4 hours as needed for pain (moderate to severe)    Post-operative pain       senna-docusate 8.6-50 MG per tablet    SENOKOT-S;PERICOLACE    30 tablet    Take 1-2 tablets by mouth 2 times daily Take while on oral narcotics to prevent or treat constipation.    Post-operative pain

## 2019-08-24 NOTE — DISCHARGE NOTE OB - CARE PLAN
Principal Discharge DX:	 (normal spontaneous vaginal delivery)  Goal:	Recovery  Assessment and plan of treatment:	Patient should transition to regular activity level. Resume regular diet. Patient should follow up with her OB for a postpartum checkup 4-6 weeks after discharge from the hospital. Patient should call her doctor sooner if she develops a fever or uncontrolled vaginal bleeding.

## 2019-08-24 NOTE — OB RN PATIENT PROFILE - ANESTHESIA, PREVIOUS REACTION, PROFILE
Called and spoke to patient to advise we received his RX somatuline from [facility]  none

## 2019-08-24 NOTE — DISCHARGE NOTE OB - PLAN OF CARE
Recovery Patient should transition to regular activity level. Resume regular diet. Patient should follow up with her OB for a postpartum checkup 4-6 weeks after discharge from the hospital. Patient should call her doctor sooner if she develops a fever or uncontrolled vaginal bleeding.

## 2019-08-24 NOTE — CHART NOTE - NSCHARTNOTEFT_GEN_A_CORE
Labor Progress Note    S: Pt complaining of rectal pressure s/p epidural placement but has not gotten complete relief.     O:  T(C): 36.4, Max: 36.8 (08-24-19 @ 02:15)  T(F): 97.52, Max: 98.24 (08-24-19 @ 04:14)  HR: 104 (74 - 117)  BP: 127/81 (92/54 - 150/72)  RR: 18 (18 - 18)  SpO2: 99% (89% - 100%)    FHT: Cat 1  [place]: q2-3mins, pitocen  SVE: 10/100/0, ruptured, vertex     A/P  Continue current management  Expecting vaginal delivery soon  GBS:+ on ampicillin       Oneil BROWN PGY3

## 2019-08-24 NOTE — OB PROVIDER DELIVERY SUMMARY - NSPROVIDERDELIVERYNOTE_OBGYN_ALL_OB_FT
Patient felt rectal pressure and was found to be fully dilated, +1 station. She pushed effectively for 10 minutes. In conjunction with maternal effort, she delivered a viable male infant over intact perineum, no nuchal cord. Cord clamping was delayed 20 seconds. Cord gases sent. Placenta delivered spontaneously and intact. Perineum and vagina were inspected, a small first degree midline vaginal laceration noted to be hemostatic and did not require repair. Excellent hemostasis obtained. . Healthy male infant weighing 7lbs 7oz, apgars 9/9.

## 2019-08-24 NOTE — OB PROVIDER H&P - ASSESSMENT
A/P: 29y year old  at 39.4 week  for elective IOL  -admit to L&D  -routine labs  -IV fluids  - ampicillin for GBS +  - plan IOL with pitocin  -discussed with Dr. peter A/P: 29y year old  at 39.4 week  for elective IOL  -admit to L&D  -routine labs  -IV fluids  - ampicillin for GBS +  - plan IOL with pitocin - Huertas score = 8  -discussed with Dr. peter

## 2019-08-24 NOTE — DISCHARGE NOTE OB - CARE PROVIDER_API CALL
Alexander Riddle)  Obstetrics and Gynecology  [address] [facility] [facility], [address]  [place] [address]  Phone: [phone]  Fax: [phone]  Follow Up Time:

## 2019-08-24 NOTE — OB RN DELIVERY SUMMARY - NSSTERILIZETYPE_OBGYN_ALL_OB
TRISTON intern spoke with pt by phone to ask questions about Medicaid.  TRISTON intern spoke with pt by phone again and pt explained that they are on a wait-list to receive services from both the [place] [employer] and the [employer]. He stated that he would be interested in getting his name of the [employer] Waiver List. TRISTON de la vega informed pt that his PT, Evangelist ROJAS, is not planning to discharge, but rather, to create some sort of maintenance plan after re-evaluating next week.   
None

## 2019-08-24 NOTE — OB PROVIDER H&P - HISTORY OF PRESENT ILLNESS
29y year old  at 39.4 week  consistent with presents for elective induction of labor. Pregnancy complicate by RH  -. Rhogam given 6/10/19.     Denies: vaginal bleeding, vaginal d/c, contractions, loss of fluid  +fetal movement    PMH: asthma   PSH: none  OBH:  in 2018  after IOL  - 3050 g Female infant   Alllergy: NKDA  meds: PNV    Preg complications:   GBS: +  HIV: NR  RPR: NR  Rubella: immune  HepB: NR  O+

## 2019-08-24 NOTE — OB RN DELIVERY SUMMARY - NS_SEPSISRSKCALC_OBGYN_ALL_OB_FT
No temperature has been documented for this patient in CPN or on the OB Flowsheet. Ensure the highest temperature during labor was documented on the OB Flowsheet.  No gestational age at birth has been documented. Ensure delivery date/time has been entered above.  Rupture of membranes must be entered above. EOS calculated successfully. EOS Risk Factor: 0.5/1000 live births ([facility] national incidence); GA=39w4d; Temp=98.24; ROM=4.05; GBS='Positive'; Antibiotics='Broad spectrum antibiotics > 4 hrs prior to birth'

## 2019-08-24 NOTE — DISCHARGE NOTE OB - CARE PROVIDERS DIRECT ADDRESSES
[email or web address]

## 2019-08-24 NOTE — CHART NOTE - NSCHARTNOTEFT_GEN_A_CORE
Patient feeling more pressure and pain. She is requesting epidural.    Vital Signs Last 24 Hrs  T(C): 36.8 (24 Aug 2019 06:55), Max: 36.8 (24 Aug 2019 02:15)  T(F): 98.24 (24 Aug 2019 06:55), Max: 98.24 (24 Aug 2019 04:14)  HR: 85 (24 Aug 2019 07:23) (81 - 90)  BP: 120/76 (24 Aug 2019 07:23) (111/69 - 120/76)  RR: 18 (24 Aug 2019 02:15) (18 - 18)  SpO2: 100% (24 Aug 2019 08:08) (100% - 100%)    SVE: 4/80/0  FHT:  [place] Patient feeling more pressure and pain. She is requesting epidural.    Vital Signs Last 24 Hrs  T(C): 36.8 (24 Aug 2019 06:55), Max: 36.8 (24 Aug 2019 02:15)  T(F): 98.24 (24 Aug 2019 06:55), Max: 98.24 (24 Aug 2019 04:14)  HR: 85 (24 Aug 2019 07:23) (81 - 90)  BP: 120/76 (24 Aug 2019 07:23) (111/69 - 120/76)  RR: 18 (24 Aug 2019 02:15) (18 - 18)  SpO2: 100% (24 Aug 2019 08:08) (100% - 100%)    SVE: 4/80/0  FHT: baseline 130, moderate variability, accels, no decels  [place]: ctx q 2min    -Hydrating with 1L LR bolus, then will get epidural    Discussed with Dr. Riddle

## 2019-08-24 NOTE — DISCHARGE NOTE OB - MATERIALS PROVIDED
Bottle Feeding Log/Back To Sleep Handout/Breastfeeding Guide and Packet/[facility]  Screening Program/  Immunization Record/Breastfeeding Log/[facility] Hearing Screen Program/Shaken Baby Prevention Handout/Birth Certificate Instructions/Tdap Vaccination (VIS Pub Date: 2012)/Vaccinations/Breastfeeding Mother’s Support Group Information/Guide to Postpartum Care/MMR Vaccination (VIS Pub Date: 2012)/Discharge Medication Information for Patients and Families Pocket Guide

## 2019-08-24 NOTE — CHART NOTE - NSCHARTNOTEFT_GEN_A_CORE
Patient resting comfortably in bed. She reports feeling mild contractions, but she does not require pain relief at this time.    Vital Signs Last 24 Hrs  T(C): 36.8 (24 Aug 2019 06:55), Max: 36.8 (24 Aug 2019 02:15)  T(F): 98.24 (24 Aug 2019 06:55), Max: 98.24 (24 Aug 2019 04:14)  HR: 85 (24 Aug 2019 07:23) (81 - 90)  BP: 120/76 (24 Aug 2019 07:23) (111/69 - 120/76)  RR: 18 (24 Aug 2019 02:15) (18 - 18)    FHT: baseline 135, minimal variability, accels, no decels  [place]: ctx q2-3    Pit is at 12  AROM at 655 with moderate clear fluid, SVE at that time was 4/80/-1    -FHT cat 2, will switch IV fluids to D5LR and put patient on O2  -Continue pitocin     Discussed with Dr. Riddle Patient resting comfortably in bed. She reports feeling contractions, she requests an epidural    Vital Signs Last 24 Hrs  T(C): 36.8 (24 Aug 2019 06:55), Max: 36.8 (24 Aug 2019 02:15)  T(F): 98.24 (24 Aug 2019 06:55), Max: 98.24 (24 Aug 2019 04:14)  HR: 85 (24 Aug 2019 07:23) (81 - 90)  BP: 120/76 (24 Aug 2019 07:23) (111/69 - 120/76)  RR: 18 (24 Aug 2019 02:15) (18 - 18)    FHT: baseline 135, minimal variability, accels, no decels  [place]aa[place]: ctx q2-3    Pit is at 12  AROM at 655 with moderate clear fluid, SVE at that time was 4/80/-1    -FHT cat 2, will switch IV fluids to D5LR and put patient on O2  -Continue pitocin     Discussed with Dr. Riddle

## 2019-08-25 LAB
HCT VFR BLD CALC: 34.9 % — SIGNIFICANT CHANGE UP (ref 34.5–45)
HGB BLD-MCNC: 11.4 G/DL — LOW (ref 11.5–15.5)

## 2019-08-25 RX ADMIN — Medication 975 MILLIGRAM(S): at 10:01

## 2019-08-25 RX ADMIN — Medication 600 MILLIGRAM(S): at 06:15

## 2019-08-25 RX ADMIN — Medication 975 MILLIGRAM(S): at 16:08

## 2019-08-25 RX ADMIN — Medication 975 MILLIGRAM(S): at 09:01

## 2019-08-25 RX ADMIN — Medication 975 MILLIGRAM(S): at 17:08

## 2019-08-25 RX ADMIN — Medication 600 MILLIGRAM(S): at 18:30

## 2019-08-25 RX ADMIN — Medication 600 MILLIGRAM(S): at 06:58

## 2019-08-25 RX ADMIN — Medication 1 TABLET(S): at 16:11

## 2019-08-25 RX ADMIN — Medication 600 MILLIGRAM(S): at 19:23

## 2019-08-25 NOTE — PROGRESS NOTE ADULT - SUBJECTIVE AND OBJECTIVE BOX
MRN: [id number]  Date Admitted: 19  Location: [facility] 2E 2011 ([facility] 2EST)  Attending: Alexander Riddle      Post Partum Progress Note: Vaginal delivery     HUONG PERAZA is a 29y  s/p vaginal delivery PPD #1, MALEINFANT, NO CIRCUMCISION .    SUBJECTIVE:  Patient was seen and examined at bedside.   No acute events overnight per nursing.   Reports feeling well this AM. She would like to go home tonight if her baby is cleared.  Pain is well controlled with PRN pain medication.   Tolerating a regular diet. Denies nausea/vomiting. + flatus. No BM.   Voiding spontaneously. Ambulating without assistance.   Reports minimal lochia.   She is breastfeeding and the baby is latching on.  Denies fevers, chills, SOB, CP, HA, vision changes or calf pain.      OBJECTIVE:  Physical exam:  General: AOx3, NAD.  Heart: RRR. S1S2.  Lungs: CTABL. Good airflow bilaterally.   Abdomen: +BS, Soft, appropriately tender, nondistended, no guarding or rebound tenderness, firm uterine fundus at umbilicus  Ext: No DVT signs, warm extremities.    Vital Signs Last 24 Hrs  T(C): 37.3 (24 Aug 2019 20:00), Max: 37.3 (24 Aug 2019 20:00)  T(F): 99.1 (24 Aug 2019 20:00), Max: 99.1 (24 Aug 2019 20:00)  HR: 105 (24 Aug 2019 20:00) (64 - 117)  BP: 112/74 (24 Aug 2019 20:00) (92/54 - 150/72)  RR: 18 (24 Aug 2019 20:00) (18 - 18)  SpO2: 87% (24 Aug 2019 10:42) (87% - 100%)    LABS: Pending AM labs                        14.0   10.29 )-----------( 177      ( 24 Aug 2019 04:01 )             41.3     Urinalysis Basic - ( 24 Aug 2019 04:01 )    Color: Yellow / Appearance: Clear / S.005 / pH: x  Gluc: x / Ketone: Negative  / Bili: Negative / Urobili: Negative mg/dL   Blood: x / Protein: Negative mg/dL / Nitrite: Negative   Leuk Esterase: Trace / RBC: 0-2 /HPF / WBC 0-2   Sq Epi: x / Non Sq Epi: Few / Bacteria: Few

## 2019-08-25 NOTE — PROGRESS NOTE ADULT - ASSESSMENT
HUONG PERAZA is a 29y  s/p vaginal delivery PPD #1, MALEINFANT, NO CIRCUMCISION. Patient has no complaints at this time. Postpartum labs pending. VSS.     Plan:  - Continue routine post-partum care  - Regular diet, advance as tolerated  - Encourage maternal- bonding  - Encourage ambulation. Use SCDs for DVT ppx  - Consider for discharge this afternoon is AM labs WNL and baby cleared by Pediatrics.

## 2019-08-26 VITALS
HEART RATE: 82 BPM | OXYGEN SATURATION: 94 % | DIASTOLIC BLOOD PRESSURE: 71 MMHG | RESPIRATION RATE: 18 BRPM | TEMPERATURE: 98 F | SYSTOLIC BLOOD PRESSURE: 104 MMHG

## 2019-08-26 PROCEDURE — 86901 BLOOD TYPING SEROLOGIC RH(D): CPT

## 2019-08-26 PROCEDURE — 59050 FETAL MONITOR W/REPORT: CPT

## 2019-08-26 PROCEDURE — 85027 COMPLETE CBC AUTOMATED: CPT

## 2019-08-26 PROCEDURE — 59025 FETAL NON-STRESS TEST: CPT

## 2019-08-26 PROCEDURE — 85018 HEMOGLOBIN: CPT

## 2019-08-26 PROCEDURE — 86850 RBC ANTIBODY SCREEN: CPT

## 2019-08-26 PROCEDURE — G0463: CPT

## 2019-08-26 PROCEDURE — 85461 HEMOGLOBIN FETAL: CPT

## 2019-08-26 PROCEDURE — 85014 HEMATOCRIT: CPT

## 2019-08-26 PROCEDURE — 86900 BLOOD TYPING SEROLOGIC ABO: CPT

## 2019-08-26 PROCEDURE — 81001 URINALYSIS AUTO W/SCOPE: CPT

## 2019-08-26 PROCEDURE — 86780 TREPONEMA PALLIDUM: CPT

## 2019-08-26 PROCEDURE — 36415 COLL VENOUS BLD VENIPUNCTURE: CPT

## 2019-08-26 RX ADMIN — Medication 600 MILLIGRAM(S): at 13:10

## 2019-08-26 RX ADMIN — Medication 1 TABLET(S): at 12:14

## 2019-08-26 RX ADMIN — Medication 600 MILLIGRAM(S): at 12:14

## 2019-08-26 RX ADMIN — Medication 975 MILLIGRAM(S): at 16:50

## 2019-08-26 RX ADMIN — Medication 975 MILLIGRAM(S): at 09:23

## 2019-08-26 RX ADMIN — Medication 975 MILLIGRAM(S): at 10:20

## 2019-08-26 RX ADMIN — Medication 975 MILLIGRAM(S): at 15:50

## 2019-08-26 NOTE — PROGRESS NOTE ADULT - ASSESSMENT
HUONG MCLEODANASTASIIATYREEJENNIFERCATERINA is a 29y  s/p vaginal delivery PPD #2. No acute overnight events    Plan:  - Continue routine post-partum care  - Regular diet, advance as tolerated  - Encourage maternal- bonding  - Encourage ambulation. Use SCDs for DVT ppx  - Plan for discharge today pending attending evaluation

## 2019-08-26 NOTE — PROGRESS NOTE ADULT - SUBJECTIVE AND OBJECTIVE BOX
HUONG PERAZA is a 29y  s/p vaginal delivery PPD #2    SUBJECTIVE:  Patient was seen and examined at bedside.   No acute events overnight per nursing.   Pain is well controlled with PRN pain medication.   Tolerating a regular diet. Denies nausea/vomiting. + flatus. -BM  Voiding spontaneously. Ambulating without assistance.   Reports minimal lochia.   She is breastfeeding and the baby is latching on.        VS:   Vital Signs Last 24 Hrs  T(C): 37.1 (25 Aug 2019 19:55), Max: 37.1 (25 Aug 2019 19:55)  T(F): 98.8 (25 Aug 2019 19:55), Max: 98.8 (25 Aug 2019 19:55)  HR: 73 (25 Aug 2019 19:55) (73 - 98)  BP: 108/72 (25 Aug 2019 19:55) (105/69 - 108/72)  RR: 18 (25 Aug 2019 19:55) (18 - 18)  SpO2: 95% (25 Aug 2019 19:55) (95% - 95%)    Physical Exam:  General: NAD  CV: RRR  Abdomen: soft, ND, firm fundus palpated at the umbilicus.   VE: +Lochia  Ext: nontender lower extremity pain bilaterally.    Labs:                        11.4   x     )-----------( x        ( 25 Aug 2019 08:27 )             34.9

## 2019-09-09 DIAGNOSIS — Z3A.38 38 WEEKS GESTATION OF PREGNANCY: ICD-10-CM

## 2019-09-09 DIAGNOSIS — N91.1 SECONDARY AMENORRHEA: ICD-10-CM

## 2019-09-09 DIAGNOSIS — Z3A.37 37 WEEKS GESTATION OF PREGNANCY: ICD-10-CM

## 2019-09-09 DIAGNOSIS — Z34.80 ENCOUNTER FOR SUPERVISION OF OTHER NORMAL PREGNANCY, UNSPECIFIED TRIMESTER: ICD-10-CM

## 2019-10-14 ENCOUNTER — APPOINTMENT (OUTPATIENT)
Dept: OBGYN | Facility: CLINIC | Age: 29
End: 2019-10-14

## 2019-10-17 NOTE — OB PROVIDER DELIVERY SUMMARY - NS_DELIVERYATTENDING1_OBGYN_ALL_OB_FT
Janessa
Introduction Text (Please End With A Colon): :
Detail Level: Detailed
Procedure To Be Performed At Next Visit: Patch testing

## 2019-11-22 ENCOUNTER — APPOINTMENT (OUTPATIENT)
Dept: OBGYN | Facility: CLINIC | Age: 29
End: 2019-11-22
Payer: MEDICAID

## 2019-11-22 VITALS
SYSTOLIC BLOOD PRESSURE: 114 MMHG | WEIGHT: 200.13 LBS | BODY MASS INDEX: 36.6 KG/M2 | HEART RATE: 73 BPM | DIASTOLIC BLOOD PRESSURE: 77 MMHG

## 2019-11-22 PROCEDURE — 99214 OFFICE O/P EST MOD 30 MIN: CPT

## 2019-11-24 LAB
APPEARANCE: CLEAR
BILIRUBIN URINE: NEGATIVE
BLOOD URINE: NEGATIVE
C TRACH RRNA SPEC QL NAA+PROBE: NOT DETECTED
COLOR: COLORLESS
GLUCOSE QUALITATIVE U: NEGATIVE
KETONES URINE: NEGATIVE
LEUKOCYTE ESTERASE URINE: NEGATIVE
N GONORRHOEA RRNA SPEC QL NAA+PROBE: NOT DETECTED
NITRITE URINE: NEGATIVE
PH URINE: 6
PROTEIN URINE: NEGATIVE
SOURCE TP AMPLIFICATION: NORMAL
SPECIFIC GRAVITY URINE: 1
UROBILINOGEN URINE: NORMAL

## 2019-11-27 LAB
HPV DNA HIGH RISK: NEGATIVE
HPV DNA LOW RISK: NEGATIVE

## 2019-11-30 LAB — CYTOLOGY CVX/VAG DOC THIN PREP: NORMAL

## 2019-12-05 ENCOUNTER — ASOB RESULT (OUTPATIENT)
Age: 29
End: 2019-12-05

## 2019-12-05 ENCOUNTER — APPOINTMENT (OUTPATIENT)
Dept: ANTEPARTUM | Facility: CLINIC | Age: 29
End: 2019-12-05
Payer: MEDICAID

## 2019-12-05 PROCEDURE — 76830 TRANSVAGINAL US NON-OB: CPT

## 2019-12-05 PROCEDURE — 76856 US EXAM PELVIC COMPLETE: CPT | Mod: 59

## 2019-12-20 ENCOUNTER — APPOINTMENT (OUTPATIENT)
Dept: OBGYN | Facility: CLINIC | Age: 29
End: 2019-12-20

## 2020-01-06 ENCOUNTER — APPOINTMENT (OUTPATIENT)
Dept: OBGYN | Facility: CLINIC | Age: 30
End: 2020-01-06
Payer: MEDICAID

## 2020-01-06 VITALS — DIASTOLIC BLOOD PRESSURE: 71 MMHG | SYSTOLIC BLOOD PRESSURE: 105 MMHG

## 2020-01-06 DIAGNOSIS — N39.0 URINARY TRACT INFECTION, SITE NOT SPECIFIED: ICD-10-CM

## 2020-01-06 PROCEDURE — 99213 OFFICE O/P EST LOW 20 MIN: CPT

## 2020-01-06 NOTE — REVIEW OF SYSTEMS
[Dysuria] : dysuria [Abdominal Pain] : abdominal pain [Frequency] : frequency [Nl] : Hematologic/Lymphatic

## 2020-02-03 ENCOUNTER — APPOINTMENT (OUTPATIENT)
Dept: OBGYN | Facility: CLINIC | Age: 30
End: 2020-02-03
Payer: MEDICAID

## 2020-02-03 VITALS
SYSTOLIC BLOOD PRESSURE: 109 MMHG | DIASTOLIC BLOOD PRESSURE: 76 MMHG | WEIGHT: 201.5 LBS | BODY MASS INDEX: 37.08 KG/M2 | HEART RATE: 82 BPM | HEIGHT: 62 IN

## 2020-02-03 PROCEDURE — 99213 OFFICE O/P EST LOW 20 MIN: CPT

## 2020-02-03 NOTE — COUNSELING
[Breast Self Exam] : breast self exam [Exercise] : exercise [Nutrition] : nutrition [STD (testing, results, tx)] : STD (testing, results, tx) [Safe Sexual Practices] : safe sexual practices [Vitamins/Supplements] : vitamins/supplements

## 2020-03-24 NOTE — OB RN TRIAGE NOTE - NS_ARRIVALFROM_OBGYN_ALL_OB
Interval History:     3/23/2020 - Pt stable overnight, looks better this A and O2 has been decreased.  Has not had much in the way of secretions.  We will plan to try a SBT and see how he does (hopefully we can extubate).  HGB has decreased but no active bleeding reported.    3/24/2020 - Stable overnight, proceeded with SBT with good parameters and was able to be extubated.  No new issues reported.  Not very awake when I saw him.  Sats 98% on NC O2 and in no distress.    Review of Systems   Unable to perform ROS: Mental acuity         Objective:     Vital Signs (Most Recent):  Temp: 98.4 °F (36.9 °C) (03/24/20 0030)  Pulse: 84 (03/24/20 0710)  Resp: (!) 37 (03/24/20 0710)  BP: 101/61 (03/24/20 0203)  SpO2: (!) 94 % (03/24/20 0710) Vital Signs (24h Range):  Temp:  [97.7 °F (36.5 °C)-98.4 °F (36.9 °C)] 98.4 °F (36.9 °C)  Pulse:  [57-84] 84  Resp:  [19-40] 37  SpO2:  [93 %-100 %] 94 %  BP: (101-124)/(56-67) 101/61  Arterial Line BP: (121-163)/(61-71) 149/67     Weight: 69.1 kg (152 lb 5.4 oz)  Body mass index is 25.35 kg/m².      Intake/Output Summary (Last 24 hours) at 3/24/2020 1402  Last data filed at 3/23/2020 1700  Gross per 24 hour   Intake 412.79 ml   Output 1700 ml   Net -1287.21 ml       Physical Exam   Constitutional: He appears well-developed and well-nourished. No distress.   chronically ill male  Just extubated, no distress   HENT:   Head: Normocephalic and atraumatic.   Right Ear: External ear normal.   Left Ear: External ear normal.   Nose: Nose normal.   Mouth/Throat: Oropharynx is clear and moist.   Eyes: Pupils are equal, round, and reactive to light. EOM are normal.   Neck: Normal range of motion. Neck supple. No JVD present. No tracheal deviation present. No thyromegaly present.   Cardiovascular: Normal rate, regular rhythm, normal heart sounds and intact distal pulses. Exam reveals no gallop and no friction rub.   No murmur heard.  Pulmonary/Chest: Effort normal and breath sounds normal. No  stridor. No respiratory distress. He has no wheezes. He has no rales. He exhibits no tenderness.   Abdominal: Soft. Bowel sounds are normal. He exhibits no distension. There is no tenderness. There is no rebound and no guarding.   Genitourinary:   Genitourinary Comments: holbrook   Musculoskeletal: Normal range of motion. He exhibits no edema or tenderness.   Lymphadenopathy:     He has no cervical adenopathy.   Neurological: He has normal reflexes. No cranial nerve deficit.   Still sleepy and cannot fully assess   Skin: He is not diaphoretic.   Psychiatric: He has a normal mood and affect. His behavior is normal.   calm   Nursing note and vitals reviewed.      Vents:  Vent Mode: Spont (03/24/20 0524)  Ventilator Initiated: Yes (03/22/20 0255)  Set Rate: 16 BPM (03/24/20 0430)  Vt Set: 350 mL (03/24/20 0524)  Pressure Support: 10 cmH20 (03/24/20 0524)  PEEP/CPAP: 5 cmH20 (03/24/20 0524)  Oxygen Concentration (%): 30 (03/24/20 0524)  Peak Airway Pressure: 16 cmH2O (03/24/20 0524)  Plateau Pressure: 12 cmH20 (03/24/20 0524)  Total Ve: 16.3 mL (03/24/20 0524)  F/VT Ratio<105 (RSBI): (!) 63.27 (03/24/20 0524)    Lines/Drains/Airways     Peripherally Inserted Central Catheter Line            PICC Triple Lumen 03/24/20 0850 left brachial;left basilic less than 1 day          Drain                 Urethral Catheter 03/19/20 2102 Straight-tip 16 Fr. 4 days         NG/OG Tube 03/23/20 0005 Left mouth 1 day          Airway                 Airway - Non-Surgical 03/22/20 0230 Endotracheal Tube 2 days          Arterial Line                 Arterial Line 03/22/20 0328 Right Radial 2 days          Peripheral Intravenous Line                 Peripheral IV - Single Lumen 03/19/20 1830 20 G Left Forearm 4 days         Peripheral IV - Single Lumen 03/19/20 1925 18 G Right Upper Arm 4 days                Significant Labs:    CBC/Anemia Profile:  Recent Labs   Lab 03/23/20  0419  03/24/20  0409 03/24/20  0410 03/24/20  0524   WBC 3.19*  --    --  3.53*  --    HGB 7.7*  --   --  10.9*  --    HCT 23.1*   < > 30* 33.2* 32*   *  --   --  184  --    MCV 97  --   --  97  --    RDW 12.4  --   --  12.5  --     < > = values in this interval not displayed.        Chemistries:  Recent Labs   Lab 03/24/20  0410      K 3.6   *   CO2 22*   BUN 10   CREATININE 0.5   CALCIUM 8.4*   MG 2.4   PHOS 3.5       All pertinent labs within the past 24 hours have been reviewed.    Recent Labs     03/24/20  0524   PH 7.432   PCO2 35.0   PO2 60*   HCO3 23.4*   POCSATURATED 92*   BE -1     Microbiology Results (last 7 days)     Procedure Component Value Units Date/Time    Culture, Respiratory with Gram Stain [[id number]] Collected:  03/22/20 1401    Order Status:  Completed Specimen:  Respiratory from Tracheal Aspirate Updated:  03/24/20 0713     Respiratory Culture Reduced normal respiratory indu     Gram Stain (Respiratory) <10 epithelial cells per low power field.     Gram Stain (Respiratory) Few WBC's     Gram Stain (Respiratory) Rare Gram positive cocci    Blood culture x two cultures. Draw prior to antibiotics. [[id number]] Collected:  03/19/20 1930    Order Status:  Completed Specimen:  Blood from Peripheral, Upper Arm, Right Updated:  03/23/20 2232     Blood Culture, Routine No Growth to date      No Growth to date      No Growth to date      No Growth to date      No Growth to date    Narrative:       Aerobic and anaerobic    Blood culture x two cultures. Draw prior to antibiotics. [[id number]] Collected:  03/19/20 2005    Order Status:  Completed Specimen:  Blood from Peripheral, Antecubital, Right Updated:  03/23/20 2232     Blood Culture, Routine No Growth to date      No Growth to date      No Growth to date      No Growth to date      No Growth to date    Narrative:       Aerobic and anaerobic    Culture, Respiratory with Gram Stain [[id number]] Collected:  03/22/20 1401    Order Status:  Canceled Specimen:  Respiratory from Tracheal Aspirate      Strep A culture, throat [[id number]] Collected:  03/19/20 2000    Order Status:  Completed Specimen:  Throat Updated:  03/22/20 0843     Strep A Culture No significant growth      No  Group A  Streptococcus isolated    Urine culture [[id number]] Collected:  03/19/20 2103    Order Status:  Completed Specimen:  Urine, Clean Catch Updated:  03/22/20 0749     Urine Culture, Routine No growth    Narrative:       Indicated criteria for high risk culture:->Other  Other (specify):->possible sepsis    MRSA Screen by PCR [[id number]] Collected:  03/19/20 2210    Order Status:  Completed Specimen:  Nasopharyngeal Swab from Nasal Updated:  03/20/20 0033     MRSA SCREEN BY PCR Negative    Throat Screen, Rapid [[id number]] Collected:  03/19/20 2000    Order Status:  Completed Specimen:  Throat Updated:  03/19/20 2041     Rapid Strep A Screen Negative     Comment: See Micro for reflexed Strep culture.               Significant Imaging:  I have reviewed and interpreted all pertinent imaging results/findings within the past 24 hours.     CXR (3/23) - bilateral infiltrates +/- better  CXR (3/24) - + infiltrates NG malpositioned (addressed)    EKG  Vent. Rate : 076 BPM     Atrial Rate : 076 BPM     P-R Int : 116 ms          QRS Dur : 078 ms      QT Int : 376 ms       P-R-T Axes : 050 028 036 degrees     QTc Int : 423 ms    Normal sinus rhythm  Normal ECG  When compared with ECG of 19-MAR-2020 19:48,  No significant change was found  Confirmed by Jesus DOMINGUEZ, Caio MCLEAN ([id number]) on 3/23/2020 11:33:03 AM   Home

## 2020-04-02 ENCOUNTER — EMERGENCY (EMERGENCY)
Facility: HOSPITAL | Age: 30
LOS: 1 days | Discharge: HOME | End: 2020-04-02
Attending: EMERGENCY MEDICINE
Payer: MEDICAID

## 2020-04-02 VITALS
RESPIRATION RATE: 18 BRPM | OXYGEN SATURATION: 96 % | TEMPERATURE: 99 F | DIASTOLIC BLOOD PRESSURE: 77 MMHG | SYSTOLIC BLOOD PRESSURE: 125 MMHG | HEIGHT: 65 IN | WEIGHT: 179.9 LBS | HEART RATE: 108 BPM

## 2020-04-02 VITALS — RESPIRATION RATE: 18 BRPM | OXYGEN SATURATION: 96 %

## 2020-04-02 PROCEDURE — T1013: CPT

## 2020-04-02 PROCEDURE — 99283 EMERGENCY DEPT VISIT LOW MDM: CPT

## 2020-04-02 PROCEDURE — 99282 EMERGENCY DEPT VISIT SF MDM: CPT

## 2020-04-02 RX ORDER — ACETAMINOPHEN 500 MG
650 TABLET ORAL ONCE
Refills: 0 | Status: COMPLETED | OUTPATIENT
Start: 2020-04-02 | End: 2020-04-02

## 2020-04-02 RX ADMIN — Medication 650 MILLIGRAM(S): at 06:02

## 2020-04-02 NOTE — ED PROVIDER NOTE - PATIENT PORTAL LINK FT
You can access the FollowMyHealth Patient Portal offered by [facility] by registering at the following website: [email or web address]. By joining [employer]’s FollowMyHealth portal, you will also be able to view your health information using other applications (apps) compatible with our system.

## 2020-04-02 NOTE — ED PROVIDER NOTE - CLINICAL SUMMARY MEDICAL DECISION MAKING FREE TEXT BOX
28 y/o F with PMHx asthma presents to ED c/o subjective fever, chills, body aches and runny nose x 2 days. Took motrin at 4pm yesterday with relief. Entire family is sick at home with similar symptoms. No SP or SOB.  -Temp 99.2F, will give tylenol  -On exam well appearing, non toxic, + clear rhinorrhea, lungs CTA, speaking full sentences, no hypoxia, no labored breathing while ambulating. Exam most consistent with viral illness, possibly COVID 19 due to current pandemic. Pt does not meet current COVID-19 hospital admission criteria.  -Lengthy discussion with patient regarding home quarantine, anticipatory guidance provided and supportive care recommended. Advised immediate return if worsening symptoms, strict return precautions, especially if short of breath or chest pain. Pt given [facility] Novel Coronavirus (COVID19) information packet in [place]. Pt verbalized understanding and agreement of plan. ED  Layla

## 2020-04-02 NOTE — ED PROVIDER NOTE - OBJECTIVE STATEMENT
30 y/o F with PMHx asthma presents to ED c/o subjective fever, chills, body aches and runny nose x 2 days. Took motrin at 4pm yesterday with relief. Entire family is sick at home with similar symptoms. Hx asthma, never intubated, has not needed to use inhaler. Sxs have not been limiting ADLs. Pt has been eating less solid food but drinking normally. Denies recent travel or contact with known COVID19 + persons. Denies HA, neck pain, weakness, dizziness, LOC, CP, SOB, palpitations, sore throat, cough, dyspnea, NVD, abd pain, urinary sxs.   LMP: currently, denies chance of pregnancy

## 2020-04-02 NOTE — ED PROVIDER NOTE - PHYSICAL EXAMINATION
Vital signs noted, see flowsheet.  General: NAD, well appearing and non-toxic.  HEENT: NC/AT. TM without erythema. MMM. Clear nasal discharge, throat without erythema or exudate. Conjunctiva and sclera clear b/l.  EOMI. PERRL.  Neck: Soft and supple, full ROM without pain. No meningeal signs  Cardiac: RRR. +S1/S2. Peripheral pulses 2+ and symmetric b/l. Capillary refill less than 2 seconds  Respiratory: Speaking in full sentences, no evidence of respiratory distress. Lungs CTA b/l, no wheezes/rhonchi/rales/stridor. No retractions or accessory muscle use. Ambulates without labored breathing or tachypnea  Abdomen: Soft, NTND. No guarding  Back: Spine midline and non-tender. No CVAT.  Skin: Normal color for race, no evidence of rash, ecchymosis, cyanosis or jaundice.   Neuro: Awake, alert and oriented to person/place/time/situation. Ambulatory with steady gait.

## 2020-04-02 NOTE — ED PROVIDER NOTE - CARE PLAN
Principal Discharge DX:	Runny nose  Secondary Diagnosis:	URI (upper respiratory infection)  Secondary Diagnosis:	Fever and chills

## 2020-04-02 NOTE — ED PROVIDER NOTE - NSFOLLOWUPINSTRUCTIONS_ED_ALL_ED_FT
EFRAIN TODAS LAS INSTRUCCIONES ADJUNTAS PARA CORONAVIRUS INMEDIATAMENTE   - No vaya al trabajo/escuela/áreas públicas/transporte público.  - Autocuarentena lizet 14 días.  - Supervise blaine síntomas utilizando el rastreador de síntomas.  - Practicar el distanciamiento social y evitar el contacto con los demás. Evite compartir artículos personales para el hogar.   - Practicar la higiene adecuada de las shari. Desinfectar superficies con frecuencia. Cúbrase la tos y estornude.   - Manténgase hidratado.      BUSCA ATENCIÓN MÉDICA INMEDIATA SI TIENES CUALQUIERA DE LOS SIGUIENTES SÍNTOMAS  **Busca atención médica inmediata si desarrollas nuevos/empeoramiento, signos/síntomas o preocupaciones, incluyendo, claudia no limitado a dificultad para respirar, dificultad para respirar, dolor en el pecho, confusión, debilidad severa.**    Si debbie que está experimentando carlos emergencia médica llame al 9-1-1.    READ ALL ATTACHED INSTRUCTIONS FOR CORONAVIRUS IMMEDIATELY   - Do not go to work/school/public areas/public transit.  - Self quarantine for 14 days.  - Monitor your symptoms using symptom tracker.  - Practice social distancing and avoid contact with others. Avoid sharing personal household items.   - Practice proper hand hygiene. Disinfect surfaces frequently. Cover your coughs and sneezes.   - Stay hydrated.      SEEK IMMEDIATE MEDICAL CARE IF YOU HAVE ANY OF THE FOLLOWING SYMPTOMS  **Seek immediate medicate attention if you develop new/worsening, signs/symptoms or concerns including, but not limited to shortness of breath, difficulty breathing, chest pain, confusion, severe weakness.**    If you believe you are experiencing a medical emergency call 9-1-1.

## 2020-04-02 NOTE — ED PROVIDER NOTE - ATTENDING CONTRIBUTION TO CARE
written material/verbal instruction I participated with the PA in the Hx, phys, plan of care, oversaw disposition

## 2020-08-20 ENCOUNTER — APPOINTMENT (OUTPATIENT)
Dept: OBGYN | Facility: CLINIC | Age: 30
End: 2020-08-20
Payer: MEDICAID

## 2020-08-20 VITALS
DIASTOLIC BLOOD PRESSURE: 78 MMHG | WEIGHT: 199.19 LBS | BODY MASS INDEX: 36.43 KG/M2 | SYSTOLIC BLOOD PRESSURE: 128 MMHG

## 2020-08-20 DIAGNOSIS — D25.9 LEIOMYOMA OF UTERUS, UNSPECIFIED: ICD-10-CM

## 2020-08-20 PROCEDURE — 99214 OFFICE O/P EST MOD 30 MIN: CPT

## 2020-08-20 NOTE — COUNSELING
[Breast Self Exam] : breast self exam [Vitamins/Supplements] : vitamins/supplements [Exercise] : exercise [Nutrition] : nutrition [Safe Sexual Practices] : safe sexual practices [STD (testing, results, tx)] : STD (testing, results, tx)

## 2020-08-20 NOTE — PHYSICAL EXAM
[Awake] : awake [Acute Distress] : no acute distress [Alert] : alert [Mass] : no breast mass [Soft] : soft [Tender] : non tender [Axillary LAD] : no axillary lymphadenopathy [Nipple Discharge] : no nipple discharge [Oriented x3] : oriented to person, place, and time [Normal] : uterus [No Bleeding] : there was no active vaginal bleeding [Enlarged ___ wks] : enlarged [unfilled] ~Uweeks [Tenderness] : tender [Uterine Adnexae] : were not tender and not enlarged

## 2020-09-23 ENCOUNTER — APPOINTMENT (OUTPATIENT)
Dept: OBGYN | Facility: CLINIC | Age: 30
End: 2020-09-23
Payer: MEDICAID

## 2020-09-23 VITALS
WEIGHT: 200 LBS | DIASTOLIC BLOOD PRESSURE: 81 MMHG | HEART RATE: 96 BPM | SYSTOLIC BLOOD PRESSURE: 124 MMHG | BODY MASS INDEX: 36.8 KG/M2 | HEIGHT: 62 IN

## 2020-09-23 PROCEDURE — 99213 OFFICE O/P EST LOW 20 MIN: CPT

## 2020-11-06 ENCOUNTER — EMERGENCY (EMERGENCY)
Facility: HOSPITAL | Age: 30
LOS: 1 days | Discharge: HOME | End: 2020-11-06
Attending: EMERGENCY MEDICINE
Payer: MEDICAID

## 2020-11-06 VITALS
SYSTOLIC BLOOD PRESSURE: 153 MMHG | TEMPERATURE: 99 F | OXYGEN SATURATION: 99 % | HEIGHT: 65 IN | RESPIRATION RATE: 18 BRPM | DIASTOLIC BLOOD PRESSURE: 97 MMHG | HEART RATE: 77 BPM

## 2020-11-06 LAB
ALBUMIN SERPL ELPH-MCNC: 4.1 G/DL — SIGNIFICANT CHANGE UP (ref 3.3–5.2)
ALP SERPL-CCNC: 122 U/L — HIGH (ref 40–120)
ALT FLD-CCNC: 13 U/L — SIGNIFICANT CHANGE UP
ANION GAP SERPL CALC-SCNC: 11 MMOL/L — SIGNIFICANT CHANGE UP (ref 5–17)
AST SERPL-CCNC: 17 U/L — SIGNIFICANT CHANGE UP
BASOPHILS # BLD AUTO: 0.03 K/UL — SIGNIFICANT CHANGE UP (ref 0–0.2)
BASOPHILS NFR BLD AUTO: 0.3 % — SIGNIFICANT CHANGE UP (ref 0–2)
BILIRUB SERPL-MCNC: <0.2 MG/DL — LOW (ref 0.4–2)
BUN SERPL-MCNC: 16 MG/DL — SIGNIFICANT CHANGE UP (ref 8–20)
CALCIUM SERPL-MCNC: 9.4 MG/DL — SIGNIFICANT CHANGE UP (ref 8.6–10.2)
CHLORIDE SERPL-SCNC: 103 MMOL/L — SIGNIFICANT CHANGE UP (ref 98–107)
CO2 SERPL-SCNC: 26 MMOL/L — SIGNIFICANT CHANGE UP (ref 22–29)
CREAT SERPL-MCNC: 0.68 MG/DL — SIGNIFICANT CHANGE UP (ref 0.5–1.3)
EOSINOPHIL # BLD AUTO: 0.18 K/UL — SIGNIFICANT CHANGE UP (ref 0–0.5)
EOSINOPHIL NFR BLD AUTO: 1.9 % — SIGNIFICANT CHANGE UP (ref 0–6)
GLUCOSE SERPL-MCNC: 100 MG/DL — HIGH (ref 70–99)
HCG SERPL-ACNC: <4 MIU/ML — SIGNIFICANT CHANGE UP
HCT VFR BLD CALC: 39.7 % — SIGNIFICANT CHANGE UP (ref 34.5–45)
HGB BLD-MCNC: 13.4 G/DL — SIGNIFICANT CHANGE UP (ref 11.5–15.5)
IMM GRANULOCYTES NFR BLD AUTO: 0.2 % — SIGNIFICANT CHANGE UP (ref 0–1.5)
LYMPHOCYTES # BLD AUTO: 2.34 K/UL — SIGNIFICANT CHANGE UP (ref 1–3.3)
LYMPHOCYTES # BLD AUTO: 24.3 % — SIGNIFICANT CHANGE UP (ref 13–44)
MCHC RBC-ENTMCNC: 30.9 PG — SIGNIFICANT CHANGE UP (ref 27–34)
MCHC RBC-ENTMCNC: 33.8 GM/DL — SIGNIFICANT CHANGE UP (ref 32–36)
MCV RBC AUTO: 91.7 FL — SIGNIFICANT CHANGE UP (ref 80–100)
MONOCYTES # BLD AUTO: 0.51 K/UL — SIGNIFICANT CHANGE UP (ref 0–0.9)
MONOCYTES NFR BLD AUTO: 5.3 % — SIGNIFICANT CHANGE UP (ref 2–14)
NEUTROPHILS # BLD AUTO: 6.55 K/UL — SIGNIFICANT CHANGE UP (ref 1.8–7.4)
NEUTROPHILS NFR BLD AUTO: 68 % — SIGNIFICANT CHANGE UP (ref 43–77)
PLATELET # BLD AUTO: 234 K/UL — SIGNIFICANT CHANGE UP (ref 150–400)
POTASSIUM SERPL-MCNC: 4.2 MMOL/L — SIGNIFICANT CHANGE UP (ref 3.5–5.3)
POTASSIUM SERPL-SCNC: 4.2 MMOL/L — SIGNIFICANT CHANGE UP (ref 3.5–5.3)
PROT SERPL-MCNC: 7.6 G/DL — SIGNIFICANT CHANGE UP (ref 6.6–8.7)
RBC # BLD: 4.33 M/UL — SIGNIFICANT CHANGE UP (ref 3.8–5.2)
RBC # FLD: 11.8 % — SIGNIFICANT CHANGE UP (ref 10.3–14.5)
SODIUM SERPL-SCNC: 140 MMOL/L — SIGNIFICANT CHANGE UP (ref 135–145)
TROPONIN T SERPL-MCNC: <0.01 NG/ML — SIGNIFICANT CHANGE UP (ref 0–0.06)
WBC # BLD: 9.63 K/UL — SIGNIFICANT CHANGE UP (ref 3.8–10.5)
WBC # FLD AUTO: 9.63 K/UL — SIGNIFICANT CHANGE UP (ref 3.8–10.5)

## 2020-11-06 PROCEDURE — 99284 EMERGENCY DEPT VISIT MOD MDM: CPT

## 2020-11-06 PROCEDURE — T1013: CPT

## 2020-11-06 PROCEDURE — 93005 ELECTROCARDIOGRAM TRACING: CPT

## 2020-11-06 PROCEDURE — 93010 ELECTROCARDIOGRAM REPORT: CPT

## 2020-11-06 PROCEDURE — 99285 EMERGENCY DEPT VISIT HI MDM: CPT

## 2020-11-06 PROCEDURE — 80053 COMPREHEN METABOLIC PANEL: CPT

## 2020-11-06 PROCEDURE — 84484 ASSAY OF TROPONIN QUANT: CPT

## 2020-11-06 PROCEDURE — 85025 COMPLETE CBC W/AUTO DIFF WBC: CPT

## 2020-11-06 PROCEDURE — 71275 CT ANGIOGRAPHY CHEST: CPT

## 2020-11-06 PROCEDURE — 36415 COLL VENOUS BLD VENIPUNCTURE: CPT

## 2020-11-06 PROCEDURE — 71275 CT ANGIOGRAPHY CHEST: CPT | Mod: 26

## 2020-11-06 PROCEDURE — 84702 CHORIONIC GONADOTROPIN TEST: CPT

## 2020-11-06 NOTE — ED STATDOCS - CLINICAL SUMMARY MEDICAL DECISION MAKING FREE TEXT BOX
Patient with R sided chest pain worse in the past week. Hx of covid infection. Sent in to r/o PE based on sx. Will check labs, and perform CT scan.

## 2020-11-06 NOTE — ED STATDOCS - OBJECTIVE STATEMENT
31 y/o F with PMHx of asthma, presents to the ED sent by urgent care c/o R sided chest pain that radiates to her back worsening the past 10 days.  Pt reports calf pain that radiates down to her foot [id number]. She states she has pain in her R shoulder that occurs more at night since April. Pt states chest pain is exacerbated with positional change, movement, and when breathing deeply. Normal chest x-ray done at urgent care today s/p positive covid-19 in April. Pt sent by urgent care to r/o PE based on SOB and calf pain.  Denies SOB at this time. Pt states she had cough, dizziness, fever, and similar sx when she tested positive for covid-19 in April. Pt states she was prescribed muscle relaxer and takes ibuprofen for pain. Denies fever or cough. 31 y/o F with PMHx of asthma, presents to the ED sent by urgent care c/o R sided chest pain worsening the past 10 days.  Pt reports R calf pain that radiates down to her foot [id number]. She states she also has pain in her R shoulder that occurs more at night since April. Pt states chest pain is exacerbated with positional change, movement, and when breathing deeply. Normal chest x-ray done at urgent care today s/p positive covid-19 in April. Pt sent by urgent care to r/o PE based on SOB and calf pain sx.  Denies SOB at this time. Pt states she had cough, dizziness, fever, and similar sx when she tested positive for covid-19 in April. Pt states she takes muscle relaxer and ibuprofen for pain. Denies fever or cough.

## 2020-11-06 NOTE — ED STATDOCS - PROGRESS NOTE DETAILS
SUSHANT SQUIRES: pt with R sided Cp with inspiration sent by . HPI/ ROS/ PEx as stated above. labs neg. CTA neg for PE. Advised pt to f/u with PMD in 2-3 days, Return precautions provided.

## 2020-11-06 NOTE — ED ADULT TRIAGE NOTE - CHIEF COMPLAINT QUOTE
chest pain when breathing and pain in right shoulder. sent in from urgent care. also c/o uti, and calf pain.

## 2020-11-06 NOTE — ED STATDOCS - PATIENT PORTAL LINK FT
You can access the FollowMyHealth Patient Portal offered by [facility] by registering at the following website: [email or web address]. By joining [employer]’s FollowMyHealth portal, you will also be able to view your health information using other applications (apps) compatible with our system.

## 2020-11-06 NOTE — ED STATDOCS - NSFOLLOWUPINSTRUCTIONS_ED_ALL_ED_FT
-Follow up with your primary medical doctor in 2-3 days   -Take Motrin and Tylenol as needed for the pain    Chest Pain    Chest pain can be caused by many different conditions which may or may not be dangerous. Causes include heartburn, lung infections, heart attack, blood clot in lungs, skin infections, strain or damage to muscle, cartilage, or bones, etc. In addition to a history and physical examination, an electrocardiogram (ECG) or other lab tests may have been performed to determine the cause of your chest pain. Follow up with your primary care provider or with a cardiologist as instructed.     SEEK IMMEDIATE MEDICAL CARE IF YOU HAVE ANY OF THE FOLLOWING SYMPTOMS: worsening chest pain, coughing up blood, unexplained back/neck/jaw pain, severe abdominal pain, dizziness or lightheadedness, fainting, shortness of breath, sweaty or clammy skin, vomiting, or racing heart beat. These symptoms may represent a serious problem that is an emergency. Do not wait to see if the symptoms will go away. Get medical help right away. Call 911 and do not drive yourself to the hospital.

## 2020-12-23 PROBLEM — N39.0 ACUTE UTI: Status: RESOLVED | Noted: 2020-01-06 | Resolved: 2020-12-23

## 2021-02-11 ENCOUNTER — EMERGENCY (EMERGENCY)
Facility: HOSPITAL | Age: 31
LOS: 1 days | Discharge: HOME | End: 2021-02-11
Attending: EMERGENCY MEDICINE
Payer: MEDICAID

## 2021-02-11 VITALS
OXYGEN SATURATION: 98 % | TEMPERATURE: 99 F | HEART RATE: 76 BPM | HEIGHT: 65 IN | DIASTOLIC BLOOD PRESSURE: 72 MMHG | SYSTOLIC BLOOD PRESSURE: 120 MMHG | RESPIRATION RATE: 20 BRPM | WEIGHT: 199.96 LBS

## 2021-02-11 LAB
ALBUMIN SERPL ELPH-MCNC: 4 G/DL — SIGNIFICANT CHANGE UP (ref 3.3–5.2)
ALP SERPL-CCNC: 111 U/L — SIGNIFICANT CHANGE UP (ref 40–120)
ALT FLD-CCNC: 14 U/L — SIGNIFICANT CHANGE UP
ANION GAP SERPL CALC-SCNC: 13 MMOL/L — SIGNIFICANT CHANGE UP (ref 5–17)
AST SERPL-CCNC: 17 U/L — SIGNIFICANT CHANGE UP
BASOPHILS # BLD AUTO: 0.03 K/UL — SIGNIFICANT CHANGE UP (ref 0–0.2)
BASOPHILS NFR BLD AUTO: 0.3 % — SIGNIFICANT CHANGE UP (ref 0–2)
BILIRUB SERPL-MCNC: 0.2 MG/DL — LOW (ref 0.4–2)
BUN SERPL-MCNC: 13 MG/DL — SIGNIFICANT CHANGE UP (ref 8–20)
CALCIUM SERPL-MCNC: 9 MG/DL — SIGNIFICANT CHANGE UP (ref 8.6–10.2)
CHLORIDE SERPL-SCNC: 101 MMOL/L — SIGNIFICANT CHANGE UP (ref 98–107)
CO2 SERPL-SCNC: 22 MMOL/L — SIGNIFICANT CHANGE UP (ref 22–29)
CREAT SERPL-MCNC: 0.65 MG/DL — SIGNIFICANT CHANGE UP (ref 0.5–1.3)
EOSINOPHIL # BLD AUTO: 0.22 K/UL — SIGNIFICANT CHANGE UP (ref 0–0.5)
EOSINOPHIL NFR BLD AUTO: 2.3 % — SIGNIFICANT CHANGE UP (ref 0–6)
GLUCOSE SERPL-MCNC: 97 MG/DL — SIGNIFICANT CHANGE UP (ref 70–99)
HCT VFR BLD CALC: 41.4 % — SIGNIFICANT CHANGE UP (ref 34.5–45)
HGB BLD-MCNC: 13.9 G/DL — SIGNIFICANT CHANGE UP (ref 11.5–15.5)
IMM GRANULOCYTES NFR BLD AUTO: 0.3 % — SIGNIFICANT CHANGE UP (ref 0–1.5)
LYMPHOCYTES # BLD AUTO: 2.67 K/UL — SIGNIFICANT CHANGE UP (ref 1–3.3)
LYMPHOCYTES # BLD AUTO: 27.6 % — SIGNIFICANT CHANGE UP (ref 13–44)
MCHC RBC-ENTMCNC: 30.6 PG — SIGNIFICANT CHANGE UP (ref 27–34)
MCHC RBC-ENTMCNC: 33.6 GM/DL — SIGNIFICANT CHANGE UP (ref 32–36)
MCV RBC AUTO: 91.2 FL — SIGNIFICANT CHANGE UP (ref 80–100)
MONOCYTES # BLD AUTO: 0.72 K/UL — SIGNIFICANT CHANGE UP (ref 0–0.9)
MONOCYTES NFR BLD AUTO: 7.4 % — SIGNIFICANT CHANGE UP (ref 2–14)
NEUTROPHILS # BLD AUTO: 6 K/UL — SIGNIFICANT CHANGE UP (ref 1.8–7.4)
NEUTROPHILS NFR BLD AUTO: 62.1 % — SIGNIFICANT CHANGE UP (ref 43–77)
PLATELET # BLD AUTO: 204 K/UL — SIGNIFICANT CHANGE UP (ref 150–400)
POTASSIUM SERPL-MCNC: 3.9 MMOL/L — SIGNIFICANT CHANGE UP (ref 3.5–5.3)
POTASSIUM SERPL-SCNC: 3.9 MMOL/L — SIGNIFICANT CHANGE UP (ref 3.5–5.3)
PROT SERPL-MCNC: 7.2 G/DL — SIGNIFICANT CHANGE UP (ref 6.6–8.7)
RBC # BLD: 4.54 M/UL — SIGNIFICANT CHANGE UP (ref 3.8–5.2)
RBC # FLD: 11.9 % — SIGNIFICANT CHANGE UP (ref 10.3–14.5)
SODIUM SERPL-SCNC: 136 MMOL/L — SIGNIFICANT CHANGE UP (ref 135–145)
TROPONIN T SERPL-MCNC: <0.01 NG/ML — SIGNIFICANT CHANGE UP (ref 0–0.06)
WBC # BLD: 9.67 K/UL — SIGNIFICANT CHANGE UP (ref 3.8–10.5)
WBC # FLD AUTO: 9.67 K/UL — SIGNIFICANT CHANGE UP (ref 3.8–10.5)

## 2021-02-11 PROCEDURE — 99284 EMERGENCY DEPT VISIT MOD MDM: CPT

## 2021-02-11 PROCEDURE — 36415 COLL VENOUS BLD VENIPUNCTURE: CPT

## 2021-02-11 PROCEDURE — 99284 EMERGENCY DEPT VISIT MOD MDM: CPT | Mod: 25

## 2021-02-11 PROCEDURE — 84484 ASSAY OF TROPONIN QUANT: CPT

## 2021-02-11 PROCEDURE — 71045 X-RAY EXAM CHEST 1 VIEW: CPT

## 2021-02-11 PROCEDURE — 80053 COMPREHEN METABOLIC PANEL: CPT

## 2021-02-11 PROCEDURE — 85025 COMPLETE CBC W/AUTO DIFF WBC: CPT

## 2021-02-11 PROCEDURE — 71045 X-RAY EXAM CHEST 1 VIEW: CPT | Mod: 26

## 2021-02-11 RX ORDER — IBUPROFEN 200 MG
600 TABLET ORAL ONCE
Refills: 0 | Status: COMPLETED | OUTPATIENT
Start: 2021-02-11 | End: 2021-02-11

## 2021-02-11 RX ORDER — IBUPROFEN 200 MG
3 TABLET ORAL
Qty: 45 | Refills: 0
Start: 2021-02-11 | End: 2021-02-15

## 2021-02-11 RX ORDER — LIDOCAINE 4 G/100G
1 CREAM TOPICAL
Qty: 2 | Refills: 0
Start: 2021-02-11 | End: 2021-02-12

## 2021-02-11 RX ADMIN — Medication 600 MILLIGRAM(S): at 13:49

## 2021-02-11 NOTE — ED ADULT TRIAGE NOTE - CHIEF COMPLAINT QUOTE
Pt arrives to ED sent by Dr. Claudy Calderon for abnormal EKG and chest pressure , pt c/o chest pressure intermittently for two months
17-Dec-2018 15:30

## 2021-02-11 NOTE — ED PROVIDER NOTE - PHYSICAL EXAMINATION
General: Well appearing female in no acute distress  HEENT: Normocephalic, atraumatic. Moist mucous membranes. Oropharynx clear. No lymphadenopathy.  Eyes: No scleral icterus. EOMI. JOCY.  Neck: Soft and supple. Full ROM without pain. No midline tenderness  Cardiac: Regular rate and regular rhythm. No murmurs. No LE edema. Non tender to palpation of anterior chest wall. Discomfort noted lying supine  Resp: Lungs CTAB. No wheezes, rales or rhonchi.  Abd: Soft, non-tender, non-distended. No guarding or rebound. No scars, masses, or lesions.  Back: Spine midline and non-tender. No CVA tenderness.    Skin: No rashes, abrasions, or lacerations.  Neuro: AO x 3. Moves all extremities symmetrically. Motor strength and sensation grossly intact.

## 2021-02-11 NOTE — ED PROVIDER NOTE - NS ED ROS FT
General: Denies fever, chills  HEENT: Denies sensory changes, sore throat  Neck: Denies neck pain, neck stiffness  Resp: Endorses SOb. Denies coughing  Cardiovascular: Endorses Cp. Denies palpitations, LE edema  GI: Denies abdominal pain, nausea, vomiting, diarrhea  : Denies dysuria, hematuria, incontinence  MSK: Endorses back pain  Neuro: Denies HA, dizziness, numbness, weakness  Skin: Denies rashes

## 2021-02-11 NOTE — ED PROVIDER NOTE - CLINICAL SUMMARY MEDICAL DECISION MAKING FREE TEXT BOX
Patient presenting with chest and back pain. Patient symptoms relieved with ibuprofen. Bedside ultrasound showing no PCE. Patient feels well and is able to follow up outpatient. patient told to take ibuprofen regularly for pericarditis and f/u with cardiology.

## 2021-02-11 NOTE — ED PROVIDER NOTE - OBJECTIVE STATEMENT
Patient is a 29yo F with no PMH presenting with chest pain. She states that she has had intermittent R sided chest discomfort for approximately 2 months. She reports it is worse lying flat and relieved with sitting upright. She also endorses some associated SOB when lying flat. She states that she has taken 1 ibuprofen some days which helps the pain. She reports that she has been having some associated back pain. She reports that she has seen her PMD about this and they told her to come to the ER. She denies any fevers, chills, nausea, vomiting.

## 2021-02-11 NOTE — ED PROVIDER NOTE - NSFOLLOWUPCLINICS_GEN_ALL_ED_FT
[facility]  Cardiology  [address] [place] [address]  Phone: [phone]  Fax:   Follow Up Time:

## 2021-02-11 NOTE — ED PROVIDER NOTE - PATIENT PORTAL LINK FT
You can access the FollowMyHealth Patient Portal offered by [facility] by registering at the following website: [email or web address]. By joining [employer]’s FollowMyHealth portal, you will also be able to view your health information using other applications (apps) compatible with our system.

## 2021-02-11 NOTE — ED PROVIDER NOTE - ATTENDING CONTRIBUTION TO CARE
I, Jere Nolan, performed a face to face bedside interview with this patient regarding history of present illness, review of symptoms and relevant past medical, social and family history.  I completed an independent physical examination. I have communicated the patient’s plan of care and disposition with the resident.  30 year old female presents with intermittent sharp R sided chest pain x 2 months. Pain is worse with laying flat. No SOB, leg swelling, not related to exertion.  Gen: NAD, well appearing  CV: RRR  Pul: CTA b/l  Abd: Soft, non-distended, non-tender  Neuro: no focal deficits  Pt is perc negative, well appearing, ekg non-ischemic. ekg with questionable signs of pericarditis, which would fit the clinical Hx. Stable for dc on nsaids and cardio f/u

## 2021-03-01 ENCOUNTER — APPOINTMENT (OUTPATIENT)
Dept: OBGYN | Facility: CLINIC | Age: 31
End: 2021-03-01
Payer: MEDICAID

## 2021-03-01 VITALS
SYSTOLIC BLOOD PRESSURE: 120 MMHG | DIASTOLIC BLOOD PRESSURE: 78 MMHG | WEIGHT: 205.13 LBS | BODY MASS INDEX: 37.52 KG/M2

## 2021-03-01 PROCEDURE — 99213 OFFICE O/P EST LOW 20 MIN: CPT

## 2021-03-01 PROCEDURE — 99072 ADDL SUPL MATRL&STAF TM PHE: CPT

## 2021-03-03 ENCOUNTER — OUTPATIENT (OUTPATIENT)
Dept: OUTPATIENT SERVICES | Facility: HOSPITAL | Age: 31
LOS: 1 days | End: 2021-03-03
Payer: MEDICAID

## 2021-03-03 DIAGNOSIS — Z20.828 CONTACT WITH AND (SUSPECTED) EXPOSURE TO OTHER VIRAL COMMUNICABLE DISEASES: ICD-10-CM

## 2021-03-03 LAB — SARS-COV-2 RNA SPEC QL NAA+PROBE: SIGNIFICANT CHANGE UP

## 2021-03-03 PROCEDURE — U0003: CPT

## 2021-03-03 PROCEDURE — C9803: CPT

## 2021-03-03 PROCEDURE — U0005: CPT

## 2021-03-04 DIAGNOSIS — Z20.828 CONTACT WITH AND (SUSPECTED) EXPOSURE TO OTHER VIRAL COMMUNICABLE DISEASES: ICD-10-CM

## 2021-03-16 ENCOUNTER — APPOINTMENT (OUTPATIENT)
Dept: ANTEPARTUM | Facility: CLINIC | Age: 31
End: 2021-03-16

## 2021-03-26 ENCOUNTER — APPOINTMENT (OUTPATIENT)
Dept: ANTEPARTUM | Facility: CLINIC | Age: 31
End: 2021-03-26
Payer: MEDICAID

## 2021-03-26 ENCOUNTER — ASOB RESULT (OUTPATIENT)
Age: 31
End: 2021-03-26

## 2021-03-26 PROCEDURE — 76856 US EXAM PELVIC COMPLETE: CPT | Mod: 59

## 2021-03-26 PROCEDURE — 99072 ADDL SUPL MATRL&STAF TM PHE: CPT

## 2021-03-26 PROCEDURE — 76830 TRANSVAGINAL US NON-OB: CPT | Mod: 59

## 2021-03-29 ENCOUNTER — APPOINTMENT (OUTPATIENT)
Dept: OBGYN | Facility: CLINIC | Age: 31
End: 2021-03-29
Payer: MEDICAID

## 2021-03-29 VITALS — SYSTOLIC BLOOD PRESSURE: 114 MMHG | HEIGHT: 62 IN | DIASTOLIC BLOOD PRESSURE: 77 MMHG

## 2021-03-29 LAB
HCG UR QL: NEGATIVE
QUALITY CONTROL: YES

## 2021-03-29 PROCEDURE — 58558Z: CUSTOM

## 2021-03-29 PROCEDURE — 99072 ADDL SUPL MATRL&STAF TM PHE: CPT

## 2021-03-29 NOTE — PROCEDURE
[Hysteroscopy] : Hysteroscopy [Time out performed] : Pre-procedure time out performed.  Patient's name, date of birth and procedure confirmed. [Consent Obtained] : Consent obtained [Abnormal uterine bleeding] : abnormal uterine bleeding [Risks] : risks [Benefits] : benefits [Alternatives] : alternatives [Patient] : patient [Infection] : infection [Bleeding] : bleeding [Allergic Reaction] : allergic reaction [Lidocaine___ mL] : [unfilled] ~UmL of lidocaine [flexible] : Using aseptic technique a hysteroscopy was performed using a flexible hysteroscope [Sent to Pathology] : specimen was placed in buffered formalin and sent for pathology [Antibiotics given] : antibiotics not given [Hemostasis obtained] : hemostasis obtained [Tolerated Well] : Patient tolerated the procedure well [Aftercare instructions/regstrictions given and follow-up scheduled] : Aftercare instructions/restrictions given and follow-up scheduled [de-identified] : timeout performed.\par under sterile condition and with paracervical block the cervix was dilated and endometrial sampling obtained and sent to pathology\par Hysteroscopy reveals normal endometrial contour with lush features no polyps or myomas noted. Patient tolerated the procedure well

## 2021-04-01 LAB
CORE LAB BIOPSY: NORMAL
CYTOLOGY CVX/VAG DOC THIN PREP: NORMAL

## 2021-05-11 ENCOUNTER — APPOINTMENT (OUTPATIENT)
Dept: OBGYN | Facility: CLINIC | Age: 31
End: 2021-05-11
Payer: MEDICAID

## 2021-05-11 VITALS
SYSTOLIC BLOOD PRESSURE: 105 MMHG | WEIGHT: 204.44 LBS | HEART RATE: 86 BPM | DIASTOLIC BLOOD PRESSURE: 73 MMHG | BODY MASS INDEX: 37.62 KG/M2 | HEIGHT: 62 IN

## 2021-05-11 PROCEDURE — 99213 OFFICE O/P EST LOW 20 MIN: CPT

## 2021-05-11 PROCEDURE — 99072 ADDL SUPL MATRL&STAF TM PHE: CPT

## 2021-06-28 ENCOUNTER — RESULT CHARGE (OUTPATIENT)
Age: 31
End: 2021-06-28

## 2021-06-28 ENCOUNTER — APPOINTMENT (OUTPATIENT)
Dept: OBGYN | Facility: CLINIC | Age: 31
End: 2021-06-28
Payer: MEDICAID

## 2021-06-28 VITALS — SYSTOLIC BLOOD PRESSURE: 106 MMHG | WEIGHT: 207.1 LBS | DIASTOLIC BLOOD PRESSURE: 60 MMHG | BODY MASS INDEX: 37.88 KG/M2

## 2021-06-28 DIAGNOSIS — N92.1 EXCESSIVE AND FREQUENT MENSTRUATION WITH IRREGULAR CYCLE: ICD-10-CM

## 2021-06-28 DIAGNOSIS — R10.2 PELVIC AND PERINEAL PAIN: ICD-10-CM

## 2021-06-28 DIAGNOSIS — Z87.42 PERSONAL HISTORY OF OTHER DISEASES OF THE FEMALE GENITAL TRACT: ICD-10-CM

## 2021-06-28 DIAGNOSIS — R10.30 LOWER ABDOMINAL PAIN, UNSPECIFIED: ICD-10-CM

## 2021-06-28 DIAGNOSIS — Z28.21 IMMUNIZATION NOT CARRIED OUT BECAUSE OF PATIENT REFUSAL: ICD-10-CM

## 2021-06-28 LAB
HCG UR QL: POSITIVE
QUALITY CONTROL: YES

## 2021-06-28 PROCEDURE — 81025 URINE PREGNANCY TEST: CPT

## 2021-06-28 PROCEDURE — 99213 OFFICE O/P EST LOW 20 MIN: CPT

## 2021-06-28 RX ORDER — NITROFURANTOIN (MONOHYDRATE/MACROCRYSTALS) 25; 75 MG/1; MG/1
100 CAPSULE ORAL
Qty: 14 | Refills: 0 | Status: COMPLETED | COMMUNITY
Start: 2019-06-24 | End: 2021-06-28

## 2021-06-28 RX ORDER — IBUPROFEN 600 MG/1
600 TABLET, FILM COATED ORAL 3 TIMES DAILY
Qty: 90 | Refills: 2 | Status: COMPLETED | COMMUNITY
Start: 2020-08-20 | End: 2021-06-28

## 2021-06-28 RX ORDER — PNV NO.95/FERROUS FUM/FOLIC AC 28MG-0.8MG
27-0.8 TABLET ORAL DAILY
Qty: 90 | Refills: 3 | Status: COMPLETED | COMMUNITY
Start: 2019-01-24 | End: 2021-06-28

## 2021-06-28 RX ORDER — PRENATAL VIT NO.130/IRON/FOLIC 27MG-0.8MG
28-0.8 TABLET ORAL DAILY
Qty: 90 | Refills: 2 | Status: COMPLETED | COMMUNITY
Start: 2018-05-07 | End: 2021-06-28

## 2021-06-28 RX ORDER — NITROFURANTOIN (MONOHYDRATE/MACROCRYSTALS) 25; 75 MG/1; MG/1
100 CAPSULE ORAL
Qty: 14 | Refills: 3 | Status: COMPLETED | COMMUNITY
Start: 2020-01-06 | End: 2021-06-28

## 2021-06-28 RX ORDER — FLUCONAZOLE 150 MG/1
150 TABLET ORAL
Qty: 1 | Refills: 3 | Status: COMPLETED | COMMUNITY
Start: 2020-01-06 | End: 2021-06-28

## 2021-06-28 RX ORDER — PRENATAL VIT NO.130/IRON/FOLIC 27MG-0.8MG
28-0.8 TABLET ORAL DAILY
Qty: 90 | Refills: 2 | Status: COMPLETED | COMMUNITY
Start: 2018-08-06 | End: 2021-06-28

## 2021-06-28 RX ORDER — NITROFURANTOIN (MONOHYDRATE/MACROCRYSTALS) 25; 75 MG/1; MG/1
100 CAPSULE ORAL
Qty: 14 | Refills: 0 | Status: COMPLETED | COMMUNITY
Start: 2018-05-07 | End: 2021-06-28

## 2021-06-28 RX ORDER — PRENATAL VIT NO.130/IRON/FOLIC 27MG-0.8MG
28-0.8 TABLET ORAL DAILY
Qty: 90 | Refills: 2 | Status: COMPLETED | COMMUNITY
Start: 2020-09-23 | End: 2021-06-28

## 2021-07-08 ENCOUNTER — NON-APPOINTMENT (OUTPATIENT)
Age: 31
End: 2021-07-08

## 2021-07-08 DIAGNOSIS — Z87.09 PERSONAL HISTORY OF OTHER DISEASES OF THE RESPIRATORY SYSTEM: ICD-10-CM

## 2021-07-13 ENCOUNTER — NON-APPOINTMENT (OUTPATIENT)
Age: 31
End: 2021-07-13

## 2021-07-13 ENCOUNTER — ASOB RESULT (OUTPATIENT)
Age: 31
End: 2021-07-13

## 2021-07-13 ENCOUNTER — APPOINTMENT (OUTPATIENT)
Dept: ANTEPARTUM | Facility: CLINIC | Age: 31
End: 2021-07-13
Payer: MEDICAID

## 2021-07-13 ENCOUNTER — APPOINTMENT (OUTPATIENT)
Dept: OBGYN | Facility: CLINIC | Age: 31
End: 2021-07-13
Payer: MEDICAID

## 2021-07-13 VITALS
WEIGHT: 207.7 LBS | SYSTOLIC BLOOD PRESSURE: 112 MMHG | DIASTOLIC BLOOD PRESSURE: 70 MMHG | BODY MASS INDEX: 38.22 KG/M2 | HEIGHT: 62 IN

## 2021-07-13 PROCEDURE — 76801 OB US < 14 WKS SINGLE FETUS: CPT

## 2021-07-13 PROCEDURE — 0502F SUBSEQUENT PRENATAL CARE: CPT

## 2021-07-26 ENCOUNTER — NON-APPOINTMENT (OUTPATIENT)
Age: 31
End: 2021-07-26

## 2021-07-26 ENCOUNTER — APPOINTMENT (OUTPATIENT)
Dept: OBGYN | Facility: CLINIC | Age: 31
End: 2021-07-26
Payer: MEDICAID

## 2021-07-26 VITALS
DIASTOLIC BLOOD PRESSURE: 75 MMHG | SYSTOLIC BLOOD PRESSURE: 120 MMHG | HEIGHT: 62 IN | BODY MASS INDEX: 38.47 KG/M2 | WEIGHT: 209.06 LBS

## 2021-07-26 PROCEDURE — 0502F SUBSEQUENT PRENATAL CARE: CPT

## 2021-08-05 ENCOUNTER — APPOINTMENT (OUTPATIENT)
Dept: MATERNAL FETAL MEDICINE | Facility: CLINIC | Age: 31
End: 2021-08-05

## 2021-08-09 ENCOUNTER — APPOINTMENT (OUTPATIENT)
Dept: ANTEPARTUM | Facility: CLINIC | Age: 31
End: 2021-08-09
Payer: MEDICAID

## 2021-08-09 VITALS — BODY MASS INDEX: 38.41 KG/M2 | WEIGHT: 210 LBS

## 2021-08-09 PROCEDURE — 36415 COLL VENOUS BLD VENIPUNCTURE: CPT

## 2021-08-17 NOTE — ED STATDOCS - MUSCULOSKELETAL, MLM
Initial shift assessment was done and VS obtained as charted in flow sheet. Pt is A&Ox4, demonstrates proper use of call light. BP was 188/94 during initial assessment. Pt denies any pain. Writer entered room at this time to recheck, BP increased to 191/94. Pt was assisted to bathroom and back to bed with minimal assistance, tolerated fairly well. Output recorded in flow sheet. SPO2 rechecked on room air, currently 94%. Pt did have an episode of emesis without any nausea minutes before but then stated that nausea was resolved afterwards. Writer contacted Dr. Castillo Stauffer to address continued hypertension and emesis. Order received for prn Vasotec. Writer will administer once verified by pharmacy. Call light and bedside table within reach. Will continue to monitor. range of motion is not limited and there is no muscle tenderness.

## 2021-08-18 ENCOUNTER — APPOINTMENT (OUTPATIENT)
Dept: MATERNAL FETAL MEDICINE | Facility: CLINIC | Age: 31
End: 2021-08-18
Payer: MEDICAID

## 2021-08-18 ENCOUNTER — APPOINTMENT (OUTPATIENT)
Dept: ANTEPARTUM | Facility: CLINIC | Age: 31
End: 2021-08-18
Payer: MEDICAID

## 2021-08-18 ENCOUNTER — ASOB RESULT (OUTPATIENT)
Age: 31
End: 2021-08-18

## 2021-08-18 PROCEDURE — 99202 OFFICE O/P NEW SF 15 MIN: CPT

## 2021-08-18 PROCEDURE — 36415 COLL VENOUS BLD VENIPUNCTURE: CPT

## 2021-08-23 DIAGNOSIS — Z3A.14 14 WEEKS GESTATION OF PREGNANCY: ICD-10-CM

## 2021-08-23 DIAGNOSIS — Z3A.12 12 WEEKS GESTATION OF PREGNANCY: ICD-10-CM

## 2021-08-26 ENCOUNTER — NON-APPOINTMENT (OUTPATIENT)
Age: 31
End: 2021-08-26

## 2021-08-27 ENCOUNTER — APPOINTMENT (OUTPATIENT)
Dept: OBGYN | Facility: CLINIC | Age: 31
End: 2021-08-27
Payer: MEDICAID

## 2021-08-27 VITALS
WEIGHT: 207.5 LBS | DIASTOLIC BLOOD PRESSURE: 70 MMHG | SYSTOLIC BLOOD PRESSURE: 110 MMHG | BODY MASS INDEX: 38.18 KG/M2 | HEIGHT: 62 IN

## 2021-08-27 PROCEDURE — 0502F SUBSEQUENT PRENATAL CARE: CPT

## 2021-09-07 ENCOUNTER — APPOINTMENT (OUTPATIENT)
Dept: ANTEPARTUM | Facility: CLINIC | Age: 31
End: 2021-09-07
Payer: MEDICAID

## 2021-09-07 ENCOUNTER — ASOB RESULT (OUTPATIENT)
Age: 31
End: 2021-09-07

## 2021-09-07 ENCOUNTER — NON-APPOINTMENT (OUTPATIENT)
Age: 31
End: 2021-09-07

## 2021-09-07 PROCEDURE — 76811 OB US DETAILED SNGL FETUS: CPT

## 2021-09-07 PROCEDURE — 76817 TRANSVAGINAL US OBSTETRIC: CPT

## 2021-09-08 DIAGNOSIS — Z3A.18 18 WEEKS GESTATION OF PREGNANCY: ICD-10-CM

## 2021-09-13 ENCOUNTER — NON-APPOINTMENT (OUTPATIENT)
Age: 31
End: 2021-09-13

## 2021-09-14 ENCOUNTER — APPOINTMENT (OUTPATIENT)
Dept: OBGYN | Facility: CLINIC | Age: 31
End: 2021-09-14
Payer: MEDICAID

## 2021-09-14 VITALS
BODY MASS INDEX: 38.83 KG/M2 | SYSTOLIC BLOOD PRESSURE: 109 MMHG | DIASTOLIC BLOOD PRESSURE: 69 MMHG | WEIGHT: 211 LBS | HEIGHT: 62 IN

## 2021-09-14 PROCEDURE — 0502F SUBSEQUENT PRENATAL CARE: CPT

## 2021-09-27 DIAGNOSIS — Z3A.21 21 WEEKS GESTATION OF PREGNANCY: ICD-10-CM

## 2021-10-05 ENCOUNTER — NON-APPOINTMENT (OUTPATIENT)
Age: 31
End: 2021-10-05

## 2021-10-05 ENCOUNTER — APPOINTMENT (OUTPATIENT)
Dept: OBGYN | Facility: CLINIC | Age: 31
End: 2021-10-05
Payer: MEDICAID

## 2021-10-05 VITALS
HEART RATE: 103 BPM | WEIGHT: 210.9 LBS | SYSTOLIC BLOOD PRESSURE: 129 MMHG | HEIGHT: 62 IN | DIASTOLIC BLOOD PRESSURE: 89 MMHG | BODY MASS INDEX: 38.81 KG/M2

## 2021-10-05 DIAGNOSIS — Z3A.24 24 WEEKS GESTATION OF PREGNANCY: ICD-10-CM

## 2021-10-05 PROCEDURE — 0502F SUBSEQUENT PRENATAL CARE: CPT

## 2021-10-09 LAB
BASOPHILS # BLD AUTO: 0.01 K/UL
BASOPHILS NFR BLD AUTO: 0.1 %
BLD GP AB SCN SERPL QL: NORMAL
EOSINOPHIL # BLD AUTO: 0.13 K/UL
EOSINOPHIL NFR BLD AUTO: 1.4 %
GLUCOSE 1H P 50 G GLC PO SERPL-MCNC: 143 MG/DL
HCT VFR BLD CALC: 37.6 %
HGB BLD-MCNC: 12.3 G/DL
IMM GRANULOCYTES NFR BLD AUTO: 0.3 %
LYMPHOCYTES # BLD AUTO: 1.5 K/UL
LYMPHOCYTES NFR BLD AUTO: 15.7 %
MAN DIFF?: NORMAL
MCHC RBC-ENTMCNC: 30.1 PG
MCHC RBC-ENTMCNC: 32.7 GM/DL
MCV RBC AUTO: 92.2 FL
MONOCYTES # BLD AUTO: 0.46 K/UL
MONOCYTES NFR BLD AUTO: 4.8 %
NEUTROPHILS # BLD AUTO: 7.41 K/UL
NEUTROPHILS NFR BLD AUTO: 77.7 %
PLATELET # BLD AUTO: 185 K/UL
RBC # BLD: 4.08 M/UL
RBC # FLD: 13.3 %
WBC # FLD AUTO: 9.54 K/UL

## 2021-10-11 ENCOUNTER — TRANSCRIPTION ENCOUNTER (OUTPATIENT)
Age: 31
End: 2021-10-11

## 2021-10-11 DIAGNOSIS — Z13.71 ENCOUNTER FOR NONPROCREATIVE SCREENING FOR GENETIC DISEASE CARRIER STATUS: ICD-10-CM

## 2021-10-11 DIAGNOSIS — Z3A.24 24 WEEKS GESTATION OF PREGNANCY: ICD-10-CM

## 2021-10-11 DIAGNOSIS — N91.1 SECONDARY AMENORRHEA: ICD-10-CM

## 2021-10-11 DIAGNOSIS — O23.40 UNSPECIFIED INFECTION OF URINARY TRACT IN PREGNANCY, UNSPECIFIED TRIMESTER: ICD-10-CM

## 2021-10-12 ENCOUNTER — NON-APPOINTMENT (OUTPATIENT)
Age: 31
End: 2021-10-12

## 2021-11-01 ENCOUNTER — APPOINTMENT (OUTPATIENT)
Dept: OBGYN | Facility: CLINIC | Age: 31
End: 2021-11-01
Payer: MEDICAID

## 2021-11-01 VITALS
WEIGHT: 211.13 LBS | DIASTOLIC BLOOD PRESSURE: 80 MMHG | HEIGHT: 62 IN | SYSTOLIC BLOOD PRESSURE: 128 MMHG | BODY MASS INDEX: 38.85 KG/M2

## 2021-11-01 PROCEDURE — 0502F SUBSEQUENT PRENATAL CARE: CPT

## 2021-11-04 NOTE — ED STATDOCS - ATTENDING CONTRIBUTION TO CARE
Airway  Performed by: Beto Arias MD  Authorized by: Beto Arias MD     Final Airway Type:  Endotracheal airway  Final Endotracheal Airway*:  ETT  ETT Size (mm)*:  4.5  Cuff*:  Regular  Technique Used for Successful ETT Placement:  Direct laryngoscopy  Devices/Methods Used in Placement*:  Mask  Intubation Procedure*:  Preoxygenation, ETCO2, Atraumatic, Dentition Unchanged and Pharynx Clear  Insertion Site:  Oral  Blade Type*:  MAC  Blade Size*:  2  Placement Verified by: auscultation and capnometry    Glottic View*:  1 - full view of glottis  Attempts*:  1   Patient Identified, Procedure confirmed, Emergency equipment available and Safety protocols followed  Location:  OR  Urgency:  Elective  Difficult Airway: No    Indications for Airway Management:  Anesthesia  Spontaneous Ventilation: present    Sedation Level:  Anesthetized  Mask Difficulty Assessment:  1 - vent by mask  Performed By:  Anesthesiologist  Anesthesiologist:  Beto Arias MD  Start Time: 11/4/2021 8:02 AM         I, Dr. Stapleton, performed the initial face to face bedside interview with this patient regarding history of present illness, review of symptoms and relevant past medical, social and family history.  I completed an independent physical examination.  I was the initial provider who evaluated this patient. I have signed out the follow up of any pending tests (i.e. labs, radiological studies) to the ACP.  I have communicated the patient’s plan of care and disposition with the ACP.

## 2021-11-08 DIAGNOSIS — Z3A.28 28 WEEKS GESTATION OF PREGNANCY: ICD-10-CM

## 2021-11-15 ENCOUNTER — APPOINTMENT (OUTPATIENT)
Dept: OBGYN | Facility: CLINIC | Age: 31
End: 2021-11-15
Payer: MEDICAID

## 2021-11-15 VITALS
DIASTOLIC BLOOD PRESSURE: 70 MMHG | BODY MASS INDEX: 39.01 KG/M2 | WEIGHT: 212 LBS | HEIGHT: 62 IN | SYSTOLIC BLOOD PRESSURE: 108 MMHG

## 2021-11-15 PROCEDURE — 0502F SUBSEQUENT PRENATAL CARE: CPT

## 2021-11-19 DIAGNOSIS — Z3A.30 30 WEEKS GESTATION OF PREGNANCY: ICD-10-CM

## 2021-11-23 LAB
GLUCOSE 1H P 100 G GLC PO SERPL-MCNC: 169 MG/DL
GLUCOSE 2H P CHAL SERPL-MCNC: 111 MG/DL
GLUCOSE 3H P CHAL SERPL-MCNC: 90 MG/DL
GLUCOSE BS SERPL-MCNC: 94 MG/DL

## 2021-11-29 ENCOUNTER — APPOINTMENT (OUTPATIENT)
Dept: ANTEPARTUM | Facility: CLINIC | Age: 31
End: 2021-11-29
Payer: MEDICAID

## 2021-11-29 ENCOUNTER — ASOB RESULT (OUTPATIENT)
Age: 31
End: 2021-11-29

## 2021-11-29 ENCOUNTER — APPOINTMENT (OUTPATIENT)
Dept: OBGYN | Facility: CLINIC | Age: 31
End: 2021-11-29
Payer: MEDICAID

## 2021-11-29 VITALS
HEIGHT: 62 IN | BODY MASS INDEX: 39.2 KG/M2 | DIASTOLIC BLOOD PRESSURE: 82 MMHG | SYSTOLIC BLOOD PRESSURE: 126 MMHG | WEIGHT: 213 LBS

## 2021-11-29 LAB
BILIRUB UR QL STRIP: NORMAL
CLARITY UR: CLEAR
COLLECTION METHOD: NORMAL
GLUCOSE UR-MCNC: 100
HCG UR QL: 0.2 EU/DL
HGB UR QL STRIP.AUTO: NORMAL
KETONES UR-MCNC: NORMAL
LEUKOCYTE ESTERASE UR QL STRIP: NORMAL
NITRITE UR QL STRIP: NORMAL
PH UR STRIP: 6.5
PROT UR STRIP-MCNC: NORMAL
SP GR UR STRIP: 1.03

## 2021-11-29 PROCEDURE — 76816 OB US FOLLOW-UP PER FETUS: CPT

## 2021-11-29 PROCEDURE — 76819 FETAL BIOPHYS PROFIL W/O NST: CPT

## 2021-11-29 PROCEDURE — 0502F SUBSEQUENT PRENATAL CARE: CPT

## 2021-11-30 DIAGNOSIS — R73.09 OTHER ABNORMAL GLUCOSE: ICD-10-CM

## 2021-11-30 DIAGNOSIS — Z3A.32 32 WEEKS GESTATION OF PREGNANCY: ICD-10-CM

## 2021-11-30 RX ORDER — HUMAN RHO(D) IMMUNE GLOBULIN 300 UG/1
1500 INJECTION, SOLUTION INTRAMUSCULAR
Qty: 0 | Refills: 0 | Status: COMPLETED | OUTPATIENT
Start: 2021-11-30

## 2021-12-13 RX ORDER — AMPICILLIN 500 MG/1
500 CAPSULE ORAL
Qty: 14 | Refills: 0 | Status: COMPLETED | COMMUNITY
Start: 2021-07-13 | End: 2021-12-13

## 2021-12-16 ENCOUNTER — APPOINTMENT (OUTPATIENT)
Dept: OBGYN | Facility: CLINIC | Age: 31
End: 2021-12-16
Payer: MEDICAID

## 2021-12-16 VITALS
BODY MASS INDEX: 38.92 KG/M2 | SYSTOLIC BLOOD PRESSURE: 111 MMHG | WEIGHT: 211.5 LBS | HEIGHT: 62 IN | DIASTOLIC BLOOD PRESSURE: 74 MMHG

## 2021-12-16 PROCEDURE — 0502F SUBSEQUENT PRENATAL CARE: CPT

## 2021-12-27 ENCOUNTER — ASOB RESULT (OUTPATIENT)
Age: 31
End: 2021-12-27

## 2021-12-27 ENCOUNTER — APPOINTMENT (OUTPATIENT)
Dept: ANTEPARTUM | Facility: CLINIC | Age: 31
End: 2021-12-27
Payer: MEDICAID

## 2021-12-27 PROCEDURE — 76819 FETAL BIOPHYS PROFIL W/O NST: CPT

## 2021-12-27 PROCEDURE — 76816 OB US FOLLOW-UP PER FETUS: CPT

## 2021-12-30 DIAGNOSIS — Z3A.35 35 WEEKS GESTATION OF PREGNANCY: ICD-10-CM

## 2022-01-03 ENCOUNTER — APPOINTMENT (OUTPATIENT)
Dept: ANTEPARTUM | Facility: CLINIC | Age: 32
End: 2022-01-03

## 2022-01-04 ENCOUNTER — APPOINTMENT (OUTPATIENT)
Dept: OBGYN | Facility: CLINIC | Age: 32
End: 2022-01-04
Payer: MEDICAID

## 2022-01-04 ENCOUNTER — NON-APPOINTMENT (OUTPATIENT)
Age: 32
End: 2022-01-04

## 2022-01-04 VITALS
SYSTOLIC BLOOD PRESSURE: 110 MMHG | WEIGHT: 211 LBS | HEIGHT: 62 IN | BODY MASS INDEX: 38.83 KG/M2 | TEMPERATURE: 97.2 F | DIASTOLIC BLOOD PRESSURE: 70 MMHG

## 2022-01-04 PROCEDURE — 0502F SUBSEQUENT PRENATAL CARE: CPT

## 2022-01-05 ENCOUNTER — NON-APPOINTMENT (OUTPATIENT)
Age: 32
End: 2022-01-05

## 2022-01-05 DIAGNOSIS — Z3A.37 37 WEEKS GESTATION OF PREGNANCY: ICD-10-CM

## 2022-01-05 LAB
HIV1+2 AB SPEC QL IA.RAPID: NONREACTIVE
T PALLIDUM AB SER QL IA: NEGATIVE

## 2022-01-06 LAB
GP B STREP DNA SPEC QL NAA+PROBE: NORMAL
GP B STREP DNA SPEC QL NAA+PROBE: NOT DETECTED
SARS-COV-2 N GENE NPH QL NAA+PROBE: DETECTED
SOURCE GBS: NORMAL

## 2022-01-10 ENCOUNTER — APPOINTMENT (OUTPATIENT)
Dept: ANTEPARTUM | Facility: CLINIC | Age: 32
End: 2022-01-10

## 2022-01-13 ENCOUNTER — APPOINTMENT (OUTPATIENT)
Dept: OBGYN | Facility: CLINIC | Age: 32
End: 2022-01-13
Payer: MEDICAID

## 2022-01-13 VITALS
HEIGHT: 62 IN | SYSTOLIC BLOOD PRESSURE: 112 MMHG | WEIGHT: 213.9 LBS | DIASTOLIC BLOOD PRESSURE: 72 MMHG | BODY MASS INDEX: 39.36 KG/M2

## 2022-01-13 PROCEDURE — 0502F SUBSEQUENT PRENATAL CARE: CPT

## 2022-01-17 ENCOUNTER — APPOINTMENT (OUTPATIENT)
Dept: ANTEPARTUM | Facility: CLINIC | Age: 32
End: 2022-01-17

## 2022-01-20 ENCOUNTER — APPOINTMENT (OUTPATIENT)
Dept: OBGYN | Facility: CLINIC | Age: 32
End: 2022-01-20
Payer: MEDICAID

## 2022-01-20 VITALS
DIASTOLIC BLOOD PRESSURE: 74 MMHG | WEIGHT: 213 LBS | BODY MASS INDEX: 39.2 KG/M2 | SYSTOLIC BLOOD PRESSURE: 104 MMHG | HEIGHT: 62 IN

## 2022-01-20 PROCEDURE — 0502F SUBSEQUENT PRENATAL CARE: CPT

## 2022-01-21 ENCOUNTER — OUTPATIENT (OUTPATIENT)
Dept: OUTPATIENT SERVICES | Facility: HOSPITAL | Age: 32
LOS: 1 days | End: 2022-01-21
Payer: MEDICAID

## 2022-01-21 DIAGNOSIS — Z3A.38 38 WEEKS GESTATION OF PREGNANCY: ICD-10-CM

## 2022-01-21 DIAGNOSIS — Z3A.39 39 WEEKS GESTATION OF PREGNANCY: ICD-10-CM

## 2022-01-21 DIAGNOSIS — Z20.828 CONTACT WITH AND (SUSPECTED) EXPOSURE TO OTHER VIRAL COMMUNICABLE DISEASES: ICD-10-CM

## 2022-01-21 LAB — SARS-COV-2 RNA SPEC QL NAA+PROBE: SIGNIFICANT CHANGE UP

## 2022-01-22 ENCOUNTER — INPATIENT (INPATIENT)
Facility: HOSPITAL | Age: 32
LOS: 0 days | Discharge: HOME | End: 2022-01-23
Attending: OBSTETRICS & GYNECOLOGY | Admitting: OBSTETRICS & GYNECOLOGY
Payer: MEDICAID

## 2022-01-22 VITALS — TEMPERATURE: 98 F

## 2022-01-22 DIAGNOSIS — O26.893 OTHER SPECIFIED PREGNANCY RELATED CONDITIONS, THIRD TRIMESTER: ICD-10-CM

## 2022-01-22 LAB
BASOPHILS # BLD AUTO: 0.02 K/UL — SIGNIFICANT CHANGE UP (ref 0–0.2)
BASOPHILS NFR BLD AUTO: 0.2 % — SIGNIFICANT CHANGE UP (ref 0–2)
BLD GP AB SCN SERPL QL: SIGNIFICANT CHANGE UP
EOSINOPHIL # BLD AUTO: 0.09 K/UL — SIGNIFICANT CHANGE UP (ref 0–0.5)
EOSINOPHIL NFR BLD AUTO: 1 % — SIGNIFICANT CHANGE UP (ref 0–6)
HBV SURFACE AG SERPL QL IA: SIGNIFICANT CHANGE UP
HCT VFR BLD CALC: 41.4 % — SIGNIFICANT CHANGE UP (ref 34.5–45)
HGB BLD-MCNC: 13.8 G/DL — SIGNIFICANT CHANGE UP (ref 11.5–15.5)
HIV 1 & 2 AB SERPL IA.RAPID: SIGNIFICANT CHANGE UP
HIV 1+2 AB+HIV1 P24 AG SERPL QL IA: SIGNIFICANT CHANGE UP
IMM GRANULOCYTES NFR BLD AUTO: 0.3 % — SIGNIFICANT CHANGE UP (ref 0–1.5)
LYMPHOCYTES # BLD AUTO: 2 K/UL — SIGNIFICANT CHANGE UP (ref 1–3.3)
LYMPHOCYTES # BLD AUTO: 21.2 % — SIGNIFICANT CHANGE UP (ref 13–44)
MCHC RBC-ENTMCNC: 29.9 PG — SIGNIFICANT CHANGE UP (ref 27–34)
MCHC RBC-ENTMCNC: 33.3 GM/DL — SIGNIFICANT CHANGE UP (ref 32–36)
MCV RBC AUTO: 89.8 FL — SIGNIFICANT CHANGE UP (ref 80–100)
MEV IGG SER-ACNC: 20 AU/ML — SIGNIFICANT CHANGE UP
MEV IGG+IGM SER-IMP: POSITIVE — SIGNIFICANT CHANGE UP
MONOCYTES # BLD AUTO: 0.62 K/UL — SIGNIFICANT CHANGE UP (ref 0–0.9)
MONOCYTES NFR BLD AUTO: 6.6 % — SIGNIFICANT CHANGE UP (ref 2–14)
NEUTROPHILS # BLD AUTO: 6.69 K/UL — SIGNIFICANT CHANGE UP (ref 1.8–7.4)
NEUTROPHILS NFR BLD AUTO: 70.7 % — SIGNIFICANT CHANGE UP (ref 43–77)
PLATELET # BLD AUTO: 185 K/UL — SIGNIFICANT CHANGE UP (ref 150–400)
RBC # BLD: 4.61 M/UL — SIGNIFICANT CHANGE UP (ref 3.8–5.2)
RBC # FLD: 14 % — SIGNIFICANT CHANGE UP (ref 10.3–14.5)
RUBV IGG SER-ACNC: 1.9 INDEX — SIGNIFICANT CHANGE UP
RUBV IGG SER-IMP: POSITIVE — SIGNIFICANT CHANGE UP
WBC # BLD: 9.45 K/UL — SIGNIFICANT CHANGE UP (ref 3.8–10.5)
WBC # FLD AUTO: 9.45 K/UL — SIGNIFICANT CHANGE UP (ref 3.8–10.5)

## 2022-01-22 PROCEDURE — U0003: CPT

## 2022-01-22 PROCEDURE — 59400 OBSTETRICAL CARE: CPT | Mod: U9

## 2022-01-22 PROCEDURE — U0005: CPT

## 2022-01-22 RX ORDER — MAGNESIUM HYDROXIDE 400 MG/1
30 TABLET, CHEWABLE ORAL
Refills: 0 | Status: DISCONTINUED | OUTPATIENT
Start: 2022-01-22 | End: 2022-01-23

## 2022-01-22 RX ORDER — AER TRAVELER 0.5 G/1
1 SOLUTION RECTAL; TOPICAL EVERY 4 HOURS
Refills: 0 | Status: DISCONTINUED | OUTPATIENT
Start: 2022-01-22 | End: 2022-01-23

## 2022-01-22 RX ORDER — AMPICILLIN TRIHYDRATE 250 MG
2 CAPSULE ORAL ONCE
Refills: 0 | Status: COMPLETED | OUTPATIENT
Start: 2022-01-22 | End: 2022-01-22

## 2022-01-22 RX ORDER — IBUPROFEN 200 MG
600 TABLET ORAL EVERY 6 HOURS
Refills: 0 | Status: DISCONTINUED | OUTPATIENT
Start: 2022-01-22 | End: 2022-01-23

## 2022-01-22 RX ORDER — TETANUS TOXOID, REDUCED DIPHTHERIA TOXOID AND ACELLULAR PERTUSSIS VACCINE, ADSORBED 5; 2.5; 8; 8; 2.5 [IU]/.5ML; [IU]/.5ML; UG/.5ML; UG/.5ML; UG/.5ML
0.5 SUSPENSION INTRAMUSCULAR ONCE
Refills: 0 | Status: DISCONTINUED | OUTPATIENT
Start: 2022-01-22 | End: 2022-01-23

## 2022-01-22 RX ORDER — PRAMOXINE HYDROCHLORIDE 150 MG/15G
1 AEROSOL, FOAM RECTAL EVERY 4 HOURS
Refills: 0 | Status: DISCONTINUED | OUTPATIENT
Start: 2022-01-22 | End: 2022-01-23

## 2022-01-22 RX ORDER — SODIUM CHLORIDE 9 MG/ML
1000 INJECTION, SOLUTION INTRAVENOUS ONCE
Refills: 0 | Status: COMPLETED | OUTPATIENT
Start: 2022-01-22 | End: 2022-01-22

## 2022-01-22 RX ORDER — OXYCODONE HYDROCHLORIDE 5 MG/1
5 TABLET ORAL
Refills: 0 | Status: DISCONTINUED | OUTPATIENT
Start: 2022-01-22 | End: 2022-01-23

## 2022-01-22 RX ORDER — DIBUCAINE 1 %
1 OINTMENT (GRAM) RECTAL EVERY 6 HOURS
Refills: 0 | Status: DISCONTINUED | OUTPATIENT
Start: 2022-01-22 | End: 2022-01-23

## 2022-01-22 RX ORDER — DIPHENHYDRAMINE HCL 50 MG
25 CAPSULE ORAL EVERY 6 HOURS
Refills: 0 | Status: DISCONTINUED | OUTPATIENT
Start: 2022-01-22 | End: 2022-01-23

## 2022-01-22 RX ORDER — OXYTOCIN 10 UNIT/ML
VIAL (ML) INJECTION
Qty: 30 | Refills: 0 | Status: DISCONTINUED | OUTPATIENT
Start: 2022-01-22 | End: 2022-01-22

## 2022-01-22 RX ORDER — OXYTOCIN 10 UNIT/ML
333.33 VIAL (ML) INJECTION
Qty: 20 | Refills: 0 | Status: DISCONTINUED | OUTPATIENT
Start: 2022-01-22 | End: 2022-01-23

## 2022-01-22 RX ORDER — OXYCODONE HYDROCHLORIDE 5 MG/1
5 TABLET ORAL ONCE
Refills: 0 | Status: DISCONTINUED | OUTPATIENT
Start: 2022-01-22 | End: 2022-01-23

## 2022-01-22 RX ORDER — SODIUM CHLORIDE 9 MG/ML
3 INJECTION INTRAMUSCULAR; INTRAVENOUS; SUBCUTANEOUS EVERY 8 HOURS
Refills: 0 | Status: DISCONTINUED | OUTPATIENT
Start: 2022-01-22 | End: 2022-01-23

## 2022-01-22 RX ORDER — IBUPROFEN 200 MG
600 TABLET ORAL EVERY 6 HOURS
Refills: 0 | Status: COMPLETED | OUTPATIENT
Start: 2022-01-22 | End: 2022-12-21

## 2022-01-22 RX ORDER — HYDROCORTISONE 1 %
1 OINTMENT (GRAM) TOPICAL EVERY 6 HOURS
Refills: 0 | Status: DISCONTINUED | OUTPATIENT
Start: 2022-01-22 | End: 2022-01-23

## 2022-01-22 RX ORDER — BENZOCAINE 10 %
1 GEL (GRAM) MUCOUS MEMBRANE EVERY 6 HOURS
Refills: 0 | Status: DISCONTINUED | OUTPATIENT
Start: 2022-01-22 | End: 2022-01-23

## 2022-01-22 RX ORDER — ACETAMINOPHEN 500 MG
975 TABLET ORAL
Refills: 0 | Status: DISCONTINUED | OUTPATIENT
Start: 2022-01-22 | End: 2022-01-23

## 2022-01-22 RX ORDER — CITRIC ACID/SODIUM CITRATE 300-500 MG
30 SOLUTION, ORAL ORAL ONCE
Refills: 0 | Status: DISCONTINUED | OUTPATIENT
Start: 2022-01-22 | End: 2022-01-22

## 2022-01-22 RX ORDER — KETOROLAC TROMETHAMINE 30 MG/ML
30 SYRINGE (ML) INJECTION ONCE
Refills: 0 | Status: DISCONTINUED | OUTPATIENT
Start: 2022-01-22 | End: 2022-01-23

## 2022-01-22 RX ORDER — LANOLIN
1 OINTMENT (GRAM) TOPICAL EVERY 6 HOURS
Refills: 0 | Status: DISCONTINUED | OUTPATIENT
Start: 2022-01-22 | End: 2022-01-23

## 2022-01-22 RX ORDER — AMPICILLIN TRIHYDRATE 250 MG
1 CAPSULE ORAL EVERY 4 HOURS
Refills: 0 | Status: DISCONTINUED | OUTPATIENT
Start: 2022-01-22 | End: 2022-01-22

## 2022-01-22 RX ORDER — OXYTOCIN 10 UNIT/ML
333.33 VIAL (ML) INJECTION
Qty: 20 | Refills: 0 | Status: COMPLETED | OUTPATIENT
Start: 2022-01-22 | End: 2022-01-22

## 2022-01-22 RX ORDER — SIMETHICONE 80 MG/1
80 TABLET, CHEWABLE ORAL EVERY 4 HOURS
Refills: 0 | Status: DISCONTINUED | OUTPATIENT
Start: 2022-01-22 | End: 2022-01-23

## 2022-01-22 RX ORDER — SODIUM CHLORIDE 9 MG/ML
1000 INJECTION, SOLUTION INTRAVENOUS
Refills: 0 | Status: DISCONTINUED | OUTPATIENT
Start: 2022-01-22 | End: 2022-01-22

## 2022-01-22 RX ADMIN — Medication 2 MILLIUNIT(S)/MIN: at 15:00

## 2022-01-22 RX ADMIN — SODIUM CHLORIDE 125 MILLILITER(S): 9 INJECTION, SOLUTION INTRAVENOUS at 12:37

## 2022-01-22 RX ADMIN — Medication 975 MILLIGRAM(S): at 21:49

## 2022-01-22 RX ADMIN — SODIUM CHLORIDE 1000 MILLILITER(S): 9 INJECTION, SOLUTION INTRAVENOUS at 10:36

## 2022-01-22 RX ADMIN — SODIUM CHLORIDE 125 MILLILITER(S): 9 INJECTION, SOLUTION INTRAVENOUS at 09:12

## 2022-01-22 RX ADMIN — SODIUM CHLORIDE 3 MILLILITER(S): 9 INJECTION INTRAMUSCULAR; INTRAVENOUS; SUBCUTANEOUS at 22:13

## 2022-01-22 RX ADMIN — Medication 1000 MILLIUNIT(S)/MIN: at 19:05

## 2022-01-22 RX ADMIN — Medication 108 GRAM(S): at 16:18

## 2022-01-22 RX ADMIN — SODIUM CHLORIDE 125 MILLILITER(S): 9 INJECTION, SOLUTION INTRAVENOUS at 18:32

## 2022-01-22 RX ADMIN — Medication 216 GRAM(S): at 12:36

## 2022-01-22 NOTE — OB RN DELIVERY SUMMARY - NS_SEPSISRSKCALC_OBGYN_ALL_OB_FT
EOS calculated successfully. EOS Risk Factor: 0.5/1000 live births ([facility] national incidence); GA=40w;Temp=99.14; ROM=4.317; GBS='Negative'; Antibiotics='GBS specific antibiotics > 2 hrs prior to birth'

## 2022-01-22 NOTE — OB PROVIDER LABOR PROGRESS NOTE - ASSESSMENT
AROM performed, scant fluid  DC Cytotec  Start Pitocin  Peanut Ball / high toussaint's    d/w Dr. Guillermo
Patient poor pusher  Labor down    d/w Dr. Guillermo

## 2022-01-22 NOTE — OB PROVIDER DELIVERY SUMMARY - NSSELHIDDEN_OBGYN_ALL_OB_FT
[NS_DeliveryAttending1_OBGYN_ALL_OB_FT:[id number]=],[NS_DeliveryAssist1_OBGYN_ALL_OB_FT:[id number]=],[NS_DeliveryRN_OBGYN_ALL_OB_FT:[id number]=]

## 2022-01-22 NOTE — OB PROVIDER H&P - ATTENDING COMMENTS
a 31 year old  at 40w GA by LMP who presents to L&D for IOL at term. PT with some irregular contractions,   VSS  SVE- 2 thick    a/p  @ 40wks for term IOL   - plan for IOL with cytotec   - will closely  monitor    Marian Guillermo MD

## 2022-01-22 NOTE — OB RN DELIVERY SUMMARY - NSSELHIDDEN_OBGYN_ALL_OB_FT
[NS_DeliveryAttending1_OBGYN_ALL_OB_FT:[id number]=],[NS_DeliveryAssist1_OBGYN_ALL_OB_FT:[id number]=],[NS_DeliveryRN_OBGYN_ALL_OB_FT:[id number]=]

## 2022-01-22 NOTE — OB PROVIDER H&P - HISTORY OF PRESENT ILLNESS
Alma Rosa Herring is a 31 year old  at 40w GA by LMP who presents to L&D for IOL at term.        JAMI: 22   LMP: 21     Pregnancy course:   Obesity   Rh neg s/p rhogam 21      Obhx:    G1 - 2018 - FT , 1am31yt   G2 - 2019 - FT , 7lb7oz     Gynhx: +fibroids, -cysts, denies abnormal PAPs, denies STIs    Pmhx: asthma   Pshx: denies    Meds: PNV   Allergies: NKDA   Social Hx: denies alcohol, smoking or drug use during pregnancy      BMI: 38.59 (21)   Ultrasound: ceph, ant placenta (21)   EFW: 2858g (21)  Alma Rosa Herring is a 31 year old  at 40w GA by LMP who presents to L&D for IOL at term. Pt reports painful lower abdominal contractions every 2-3 minutes. She denies LOF and vaginal bleeding, and endorses fetal movement.        JAMI: 22   LMP: 21     Pregnancy course:   Obesity   Rh neg s/p rhogam 21      Obhx:    G1 - 2018 - FT , 7fz49gq   G2 - 2019 - FT , 7lb7oz     Gynhx: +fibroids, -cysts, denies abnormal PAPs, denies STIs    Pmhx: asthma   Pshx: denies    Meds: PNV   Allergies: NKDA   Social Hx: denies alcohol, smoking or drug use during pregnancy      BMI: 38.59 (21)   Ultrasound: ceph, ant placenta (21)   EFW: 2858g (21)  Alma Rosa Herring is a 31 year old  at 40w GA by LMP who presents to L&D for IOL at term. Denies regular ctx. She denies LOF and vaginal bleeding, and endorses fetal movement. No complaints.     JAMI: 22   LMP: 21     Pregnancy course:   asthma, last used inhaler in December  Obesity   Rh neg s/p rhogam 21      Obhx:    G1 - 2018 - FT , 4in08bv   G2 - 2019 - FT , 7lb7oz     Gynhx: +fibroids, -cysts, denies abnormal PAPs, denies STIs    Pmhx: asthma   Pshx: denies    Meds: PNV   Allergies: NKDA   Social Hx: denies alcohol, smoking or drug use during pregnancy      BMI: 38.59 (21)   Ultrasound: ceph, ant placenta (21)   EFW: 2858g (21)  Alma Rosa Burkett is a 31 year old  at 40w GA by LMP who presents to L&D for IOL at term. Denies regular ctx. She denies LOF and vaginal bleeding, and endorses fetal movement. No complaints.     JAMI: 22   LMP: 21     Pregnancy course:   asthma, last used inhaler in December  Obesity   Rh neg s/p rhogam 21      Obhx:    G1 - 2018 - FT , 3ak17wg   G2 - 2019 - FT , 7lb7oz     Gynhx: +fibroids, -cysts, denies abnormal PAPs, denies STIs    Pmhx: asthma   Pshx: denies    Meds: PNV   Allergies: NKDA   Social Hx: denies alcohol, smoking or drug use during pregnancy      BMI: 38.59 (21)   Ultrasound: ceph, ant placenta (21)   EFW: 2858g (21)

## 2022-01-22 NOTE — CHART NOTE - NSCHARTNOTEFT_GEN_A_CORE
Prenatal charts reviewed.  Pt found to have GBS bacteruria in first trimester.  Ampicillin administered for GBS ppx.

## 2022-01-22 NOTE — OB PROVIDER H&P - ASSESSMENT
Alma Rosa Herring is a 31 year old  at 40w GA by LMP who presents to L&D for IOL at term.  Alma Rosa Herring is a 31 year old  at 40w GA by LMP who presents to L&D for IOL at term.     Plan:  - Admit for IOL  - Admission labs  Alma Rosa Herring is a 31 year old  at 40w GA by LMP who presents to L&D for IOL at term.     A/P:   -Admit to L&D  -Consent  -Admission labs  -IV fluids  -Fetus: Cat 1 tracing. Continuous toco and fetal monitoring.   -GBS: Negative, no GBS ppx required   -Analgesia: epidural PRN   Alma Rosa Burkett is a 31 year old  at 40w GA by LMP who presents to L&D for IOL at term.     A/P:   -Admit to L&D  -Consent  -Admission labs  -IV fluids  -Fetus: Cat 1 tracing. Continuous toco and fetal monitoring.   -GBS: Negative, no GBS ppx required   -Analgesia: epidural PRN

## 2022-01-22 NOTE — OB PROVIDER H&P - NSHPPHYSICALEXAM_GEN_ALL_CORE
Vital Signs Last 24 Hrs  T(C): 36.6 (22 Jan 2022 07:01), Max: 36.6 (22 Jan 2022 06:59)  T(F): 97.9 (22 Jan 2022 07:01), Max: 97.9 (22 Jan 2022 07:01)  HR: 84 (22 Jan 2022 07:05) (84 - 84)  BP: 111/72 (22 Jan 2022 07:05) (111/72 - 111/72)  RR: 17 (22 Jan 2022 07:01) (17 - 17)    Physical Exam:  Gen: Well-appearing, NAD  Neuro: A&O x 3  Resp: Breathing comfortably on RA  Abd: Non-tender and gravid   VE: Vital Signs Last 24 Hrs  T(C): 36.6 (22 Jan 2022 07:01), Max: 36.6 (22 Jan 2022 06:59)  T(F): 97.9 (22 Jan 2022 07:01), Max: 97.9 (22 Jan 2022 07:01)  HR: 84 (22 Jan 2022 07:05) (84 - 84)  BP: 111/72 (22 Jan 2022 07:05) (111/72 - 111/72)  RR: 17 (22 Jan 2022 07:01) (17 - 17)    Physical Exam:  Gen: Well-appearing, NAD  Neuro: A&O x 3  Resp: Breathing comfortably on RA  Abd: Non-tender and gravid   VE: 2/20/-3    Bedside sono: cephalic  FHT: baseline 145, mod variability, +accels, -decels   [place]: infrequent ctx

## 2022-01-22 NOTE — OB PROVIDER DELIVERY SUMMARY - NSPROVIDERDELIVERYNOTE_OBGYN_ALL_OB_FT
P2 now P3 s/p uncomplicated  of viable female infant at 40w under epidural anesthesia after term IOL. APGARs 9/9, weight deferred due to skin to skin  With good maternal pushing effort, baby presented in JOSH. Delivered through nuchal cord x1. Shoulders delivered atraumatically under gentle guidance through the birth canal. After delivery of the body, baby placed on mother's abdomen. Cord clamped and cut after 30s delay. Cord segment obtained for cord gasses. Placenta spontaneously delivered with normal 3VC. Uterine fundus firm with IV pitocin and bimanual massage. No perineal, sulcal, cervical or periurethral lacerations. Both mother and baby are recovering well. P2 now P3 s/p uncomplicated  of viable female infant at 40w under epidural anesthesia after term IOL. APGARs 9/9, weight deferred due to skin to skin  With good maternal pushing effort, baby presented in GERMAINE. Delivered through nuchal cord x1. Shoulders delivered atraumatically under gentle guidance through the birth canal. After delivery of the body, baby placed on mother's abdomen. Cord clamped and cut after 30s delay. Cord segment obtained for cord gasses. Placenta spontaneously delivered with normal 3VC. Uterine fundus firm with IV pitocin and bimanual massage. No perineal, sulcal, cervical or periurethral lacerations. Both mother and baby are recovering well.

## 2022-01-22 NOTE — OB RN DELIVERY SUMMARY - BABY A: WEIGHT (GM) DELIVERY
Received request for refill of patients   Disp Refills Start End    insulin detemir (LEVEMIR FLEXTOUCH) 100 UNIT/ML pen-injector 15 mL 0 refills 2/5/2018, last refilled     Sig - Route: Inject 10 Units into the skin 2 times daily.      Last seen, 9/5/17, Dr. Tolliver, HTN  Last seen, 5/17/16, Dr. Reyes, DM  No pending appt.   [id number]

## 2022-01-22 NOTE — OB RN PATIENT PROFILE - VISION (WITH CORRECTIVE LENSES IF THE PATIENT USUALLY WEARS THEM):
Wears glasses/Normal vision: sees adequately in most situations; can see medication labels, newsprint

## 2022-01-23 ENCOUNTER — TRANSCRIPTION ENCOUNTER (OUTPATIENT)
Age: 32
End: 2022-01-23

## 2022-01-23 VITALS
SYSTOLIC BLOOD PRESSURE: 105 MMHG | DIASTOLIC BLOOD PRESSURE: 66 MMHG | TEMPERATURE: 98 F | OXYGEN SATURATION: 98 % | HEART RATE: 68 BPM | RESPIRATION RATE: 17 BRPM

## 2022-01-23 LAB
ABO RH CONFIRMATION: SIGNIFICANT CHANGE UP
COVID-19 SPIKE DOMAIN AB INTERP: POSITIVE
COVID-19 SPIKE DOMAIN ANTIBODY RESULT: >250 U/ML — HIGH
FETAL SCREEN: SIGNIFICANT CHANGE UP
HCT VFR BLD CALC: 35.2 % — SIGNIFICANT CHANGE UP (ref 34.5–45)
HGB BLD-MCNC: 11.6 G/DL — SIGNIFICANT CHANGE UP (ref 11.5–15.5)
SARS-COV-2 IGG+IGM SERPL QL IA: >250 U/ML — HIGH
SARS-COV-2 IGG+IGM SERPL QL IA: POSITIVE
T PALLIDUM AB TITR SER: NEGATIVE — SIGNIFICANT CHANGE UP

## 2022-01-23 PROCEDURE — 86769 SARS-COV-2 COVID-19 ANTIBODY: CPT

## 2022-01-23 PROCEDURE — 36415 COLL VENOUS BLD VENIPUNCTURE: CPT

## 2022-01-23 PROCEDURE — 59050 FETAL MONITOR W/REPORT: CPT

## 2022-01-23 PROCEDURE — 86900 BLOOD TYPING SEROLOGIC ABO: CPT

## 2022-01-23 PROCEDURE — 85025 COMPLETE CBC W/AUTO DIFF WBC: CPT

## 2022-01-23 PROCEDURE — G0463: CPT

## 2022-01-23 PROCEDURE — 85461 HEMOGLOBIN FETAL: CPT

## 2022-01-23 PROCEDURE — 85014 HEMATOCRIT: CPT

## 2022-01-23 PROCEDURE — 86762 RUBELLA ANTIBODY: CPT

## 2022-01-23 PROCEDURE — 86850 RBC ANTIBODY SCREEN: CPT

## 2022-01-23 PROCEDURE — 85018 HEMOGLOBIN: CPT

## 2022-01-23 PROCEDURE — 86780 TREPONEMA PALLIDUM: CPT

## 2022-01-23 PROCEDURE — 86703 HIV-1/HIV-2 1 RESULT ANTBDY: CPT

## 2022-01-23 PROCEDURE — 59025 FETAL NON-STRESS TEST: CPT

## 2022-01-23 PROCEDURE — 86765 RUBEOLA ANTIBODY: CPT

## 2022-01-23 PROCEDURE — 87340 HEPATITIS B SURFACE AG IA: CPT

## 2022-01-23 PROCEDURE — 86901 BLOOD TYPING SEROLOGIC RH(D): CPT

## 2022-01-23 PROCEDURE — 87389 HIV-1 AG W/HIV-1&-2 AB AG IA: CPT

## 2022-01-23 PROCEDURE — 76815 OB US LIMITED FETUS(S): CPT

## 2022-01-23 RX ORDER — ACETAMINOPHEN 500 MG
2 TABLET ORAL
Qty: 60 | Refills: 0
Start: 2022-01-23 | End: 2022-02-01

## 2022-01-23 RX ORDER — INFLUENZA VIRUS VACCINE 15; 15; 15; 15 UG/.5ML; UG/.5ML; UG/.5ML; UG/.5ML
0.5 SUSPENSION INTRAMUSCULAR ONCE
Refills: 0 | Status: DISCONTINUED | OUTPATIENT
Start: 2022-01-23 | End: 2022-01-23

## 2022-01-23 RX ORDER — IBUPROFEN 200 MG
1 TABLET ORAL
Qty: 40 | Refills: 0
Start: 2022-01-23 | End: 2022-02-01

## 2022-01-23 RX ADMIN — Medication 600 MILLIGRAM(S): at 00:12

## 2022-01-23 RX ADMIN — Medication 975 MILLIGRAM(S): at 09:24

## 2022-01-23 RX ADMIN — Medication 600 MILLIGRAM(S): at 05:05

## 2022-01-23 RX ADMIN — Medication 600 MILLIGRAM(S): at 17:18

## 2022-01-23 RX ADMIN — SODIUM CHLORIDE 3 MILLILITER(S): 9 INJECTION INTRAMUSCULAR; INTRAVENOUS; SUBCUTANEOUS at 12:21

## 2022-01-23 RX ADMIN — SODIUM CHLORIDE 3 MILLILITER(S): 9 INJECTION INTRAMUSCULAR; INTRAVENOUS; SUBCUTANEOUS at 05:20

## 2022-01-23 RX ADMIN — Medication 975 MILLIGRAM(S): at 20:20

## 2022-01-23 RX ADMIN — Medication 600 MILLIGRAM(S): at 11:26

## 2022-01-23 RX ADMIN — Medication 1 TABLET(S): at 11:27

## 2022-01-23 RX ADMIN — Medication 975 MILLIGRAM(S): at 14:49

## 2022-01-23 NOTE — DISCHARGE NOTE OB - NS MD DC FALL RISK RISK
For information on Fall & Injury Prevention, visit: [email or web address] OR  [email or web address] OR  https://www.cdc.gov/steadi/patient.html

## 2022-01-23 NOTE — PROGRESS NOTE ADULT - ASSESSMENT
A/P:  HUONG VALENTIN is a 31y  now PPD#1 s/p normal spontaneous vaginal delivery @40 GA, uncomplicated.  -Vital signs stable  -Hgb: 13.8 -> AM labs pending   - f/u fetal screen. administer rhogam as indicated  -Voiding, tolerating PO  -Advance care as tolerated   -Continue routine postpartum care and education  -Healthy female infant  -Dispo: Patient to be discharged when meeting all postpartum milestones and pending attending approval.

## 2022-01-23 NOTE — PROGRESS NOTE ADULT - SUBJECTIVE AND OBJECTIVE BOX
HUONG VALENTIN is a 31y  now PPD#1 s/p normal spontaneous vaginal delivery @40 GA, uncomplicated.    S:    No acute events overnight.   The patient has no complaints.  Pain controlled with current treatment regimen.   She is ambulating without difficulty and tolerating PO.   + flatus/-BM/+ voiding   She endorses appropriate lochia, which is decreasing.   She is providing formula to baby.  She denies fevers, chills, nausea and vomiting.   She denies lightheadedness, dizziness, palpitations, chest pain and SOB.     O:    T(C): 36.8 (22 @ 04:45), Max: 37.3 (22 @ 18:04)  HR: 84 (22 @ 04:45) (58 - 98)  BP: 98/50 (22 @ 04:45) (89/43 - 148/60)  RR: 17 (22 @ 04:45) (17 - 18)  SpO2: 98% (22 @ 04:45) (92% - 100%)    Gen: NAD, AOx3  Resp: breathing comfortably on RA  Abdomen:  Soft, non-tender, non-distended  Uterus:  Fundus firm below umbilicus  VE:  Lochia as expected  LE:  Non-tender and non-edematous                          13.8   9.45  )-----------( 185      ( 2022 10:02 )             41.4

## 2022-01-23 NOTE — DISCHARGE NOTE OB - FLU SEASON?
[facility]     62y Female from home, lives with daughter, ambulates with walker with PMH of recurrent SBO's, s/p exp lap, SB resection in 2015, ex lap, ALBINA in 2018, DVT, PE, on Xarelto, IVC filter, chronic leg swelling, and anasarca, came in to the ED due to hypotension during office visit. Patient was found to be bacteremic with bacteroids fragilis. Admitted in ICU due to hypotension and hypothermia. On vancomycin and levo . BCx X2 negative. Sputum positive for MRSA and Stenotrophomonas.     Diagnosis:  >Encephalopathy  >Sepsis  >Bacteremia  >Anemia  >DVT/PE  >Transaminitis  >Aspiration pneumonia    --------------------------------------------Neuro--------------------------------------  -She is AAOx2 at baseline on evaluation  -Encephalopathic and got intubated on 10/24  -Ammonia trending down from 152 > 130 > 116 >103>131>126>90>77----> less than 10  -On lactulose 30g daily and rifaximin, goal BM 2-3   -INR>1.63>1.71>1.91>1.98> 1.44  -Pt with multiorgan failure secondary to septic shock  -Hold CT head for now as AMS was most likely due to hepatic encephalopathy and pt didn't have any focal neurological deficit when she started having AMS  -her pupils are reactive to light b/l, patient can follow commands   - started on small dose of Precedex for tachypnea  -Off to Precedex , RR 20      -------------------------------------CVS-------------------------------  -Patient is hypotensive secondary septic shock  - patients started on levophed   -On abx Levo renally dosed , Vancomycin stopped for high vanc trough ,  -NG tube feeding  -Midodrine 10 mg TID  -RIJ for pressors  -Lasix PRN for improving UO  -Pt is getting lasix and albumin on and off   -previous ECHO 3-4 months back showed normal EF but new ECHO showed EF 15-20% with severe left ventricular dysfunction  -pt with multiorgan dysfunction sec to septic shock , mixed venous gas from RIJ showed borderline low O2 saturation , mixed picture of septic /cardiogenic shock   - anticardiolipin antibodies IgG IgM , ANCA  -Anticardiolipin abs +ve      --------------------------------Respiratory----------------------  - failed sbt   -Intubated and sedated on levophed for hypotesnion  - s/p lavage and suction for left sided mucus plaque , with following re expansion   -CT showed mild b/l pleural effusion and left lower lobe consolidation in ICU on day 2  -aspiration pneumonia  - sputum culture grew MRSA and Stenotrophomonas (sensitive to levo)  - will c/w levaquin 500 q24 hours ,   -given one dose of vancomyicn last night.  will repat trough before next vancomycin dose.  --repeat sputum culture neg  -ID Dr. Patricio    ----------------------------------------Gastrointestinal--------------------------------------  -Patient was bacteremic with bacteroids fragilis, likely secondary from intra abdominal source.   -Patient had multiple SBO in past. 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 Likely due to sepsis vs hepatic failure sec to budd Chiari  -BCx X 2 negative.  -ammonia trends down from 150 to 130 >116>103>131>126>90> 77,--<10 on lactulose and rifaximin   -monitor BM, titrate lactulose top 2-3 BM qd   -GI, DR Chatterjee deferred doing colonoscopy due to multiple comorbidities, FOBT positive on 10/15  -Dr Katlyn aguilar has been noted,    -----------------------------------------ID---------------------------------------  -Patient was bacteremic with bacteroids likely GI source.  -Lactate is normal 1.3 but trends up to 2.3 >3.7  -Procalcitonin 0.79  -On levoquin and held vanc due to high trough ,   - BP on the lower side, pt is on Levophed baby dose and midodrine 10mg TID  -Monitor vitals Q4  - Given 1 dose Vancomycin 500mg 11/6 PM  - F/U Vanc trough 10PM    ---------------------------------------------Nephro-------------------------------------  - hypernatremia improved   - on free water 120 ml q6h  -TF  - Creatinine trended down slightly today , will avoid nephrotoxin , patient in MOF   -Monitor UO,   - UO improved after Lasix trail     ----------------------------------------Hem Onc----------------------------------  -Has h/o DVt and PE in past. Patient is on Xarelto at home  -Patient has anemia  -anemia of chronic disease  -Hgb 6.8>1PRBC>8.2> 7.9>7.5>7.4>7.6  -plt trended up to 96, no active bleeding  - will start on prophylactic dose Ac Heparin 5000 q12 hrs per dr rivera recommendation   -Folate and B12 normal.   -Dr. Rivera recs following, s/p thiamine 3 doses  - will check INR daily     ----------------------------------------Endo--------------------------------  -HgbA1c 4  -Fs q6h    ----------------------------------------Prophylaxis----------------------------  DVT: heparin sq 5000 q12 hrs  GI: PPI    ---------------------------------------GOC---------------------------  -DNR   -Pt with multi organ failure secondary to septic shock with poor prognosis  -Palliative is on board  -family discussion next week         Yes...

## 2022-01-23 NOTE — DISCHARGE NOTE OB - PATIENT PORTAL LINK FT
You can access the FollowMyHealth Patient Portal offered by [facility] by registering at the following website: [email or web address]. By joining [employer]’s FollowMyHealth portal, you will also be able to view your health information using other applications (apps) compatible with our system.

## 2022-01-23 NOTE — DISCHARGE NOTE OB - CARE PROVIDER_API CALL
Alexander Riddle)  Obstetrics and Gynecology  [address], [address]  [place] [address]  Phone: [phone]  Fax: [phone]  Follow Up Time:

## 2022-01-23 NOTE — PROGRESS NOTE ADULT - ATTENDING COMMENTS
31y  now PPD#1 s/p normal spontaneous vaginal delivery @40 GA doing well. no complaints. mild lochia, pain controlled. Told diet  VSS  PPD#1 - PPD#1 doing well, infant delivered after 6pm   - f/u am CBC   - possible d/c home in     Marian Guillermo MD

## 2022-02-02 NOTE — OB RN TRIAGE NOTE - NS_MODEOFARRIVAL_OBGYN_ALL_OB
Physical Therapy Progress Note      Name: Claudia Elmore  Clinic Number: [id number]     Therapy Diagnosis:        Encounter Diagnoses   Name Primary?    Decreased strength      Hypotonia      Developmental delay        Physician: Kulwinder Barton MD     Visit Date: 2/1/2022     Physician Orders: Continuation of Therapy   Medical Diagnosis: Spinal Muscular Atrophy   Evaluation Date: 05/06/2019  Authorization Period Expiration: 1/3/2023  Plan of Care Certification Period: 12/7/2021 to 6/7/2022  Visit #/Visits authorized: 4/20 (84 prior authorized visits)      Time In: 1605  Time Out: 1645  Total Billable Time: 40 minutes     Precautions: Standard     Subjective      Claudia was brought to therapy by her father. Pt's father was present throughout the session with appropriate PPE donned.   Parent/Caregiver reports: Pt's father reports no concerns.   Response to previous treatment: good; continue improvements in gross motor skills      Pain: Patient scored 0/10 on the FLACC scale for assessment of non-verbal signs of Pain using the following criteria.   Location of pain: N/A      Criteria Score: 0 Score: 1 Score: 2   Face No particular expression or smile Occasional grimace or frown, withdrawn, uninterested Frequent to constant quivering chin, clenched jaw   Legs Normal position or relaxed Uneasy, restless, tense Kicking, or legs drawn up   Activity Lying quietly, normal position moves easily Squirming, shifting, back and forth, tense Arched, rigid, or jerking   Cry No cry (awake or asleep) Moans or whimpers; occasional complaint Crying steadily, screams or sobs, frequent complaints   Consolability Content, relaxed Reassured by occasional touching, hugging or being talked to, disractible Difficult to console or comfort      [Magda D, Dash Mohamud T, Malik S. Pain assessment in infants and young children: the FLACC scale. Am J Nurse. 2002;[id number])55-8.]     Objective   Session focused on: exercises to  develop LE strength and muscular endurance, LE range of motion and flexibility, sitting balance, standing balance, coordination, posture, kinesthetic sense and proprioception, desensitization techniques, facilitation of gait, stair negotiation, enhancement of sensory processing, promotion of adaptive responses to environmental demands, gross motor stimulation, cardiovascular endurance training, parent education and training, initiation/progression of HEP eye-hand coordination, core muscle activation.     Claudia received therapeutic exercises to develop strength, endurance, ROM, posture and core stabilization for 28 minutes including:   · Modified sit ups from a bosu 3 x 5 reps; min A at B UE to achieve full range of motion  · Tall kneeling at a bench with no UE support 2 x 40-60 seconds with max to min A at hips   · 1/2 kneeling over therapist leg 2 x 15 seconds; max A at hips   · Standing with no UE support for 30-60 seconds x 4 reps; mod-min A at hips   · Sit to stands with UE support from a small bench 3 x 5 reps; max A at hips   · Modified SLS on L LE for ~10 seconds with max A at hips     Claudia participated in gait training to improve functional mobility and safety for 12 minutes, including:  · Ambulating 140' in small PRW with pelvic stabilizer; with minimal assistance for forward propulsion of the walker         Home Exercises Provided and Patient Education Provided   Education provided:   - Patient's mother was educated on patient's current functional status and progress.  Patient's mother was educated on updated HEP.  Patient's mother verbalized understanding.     Written Home Exercises Provided: yes.  Exercises were reviewed and Claudia was able to demonstrate them prior to the end of the session.  Claudia demonstrated good  understanding of the education provided.         Assessment   Claudia was seen for a follow up visit and participated well with exercises to address her impairments in strength, balance,  and functional mobility. Improvements noted in LE strength and endurance with pt progressing 140' in PRW with increased speed and assistance only at the walker to maintain forward propulsion!   Pt prognosis is Good.      Pt will continue to benefit from skilled outpatient physical therapy to address the deficits listed in the problem list box on initial evaluation, provide pt/family education and to maximize pt's level of independence in the home and community environment.      Pt's spiritual, cultural and educational needs considered and pt agreeable to plan of care and goals.     Anticipated barriers to physical therapy: none at this time         Goals:   Goal: Patient/Caregivers will verbalize understanding of HEP and report ongoing adherence.   Date Initiated: 12/7/2021  Duration: Ongoing through discharge   Status:  continue    Comments: Pt's family continues to verbalize understanding of HEP and demonstrates compliance.    Goal: Claudia with demonstrate the ability to stand a child size bench with an upright posture, 1 UE support, and SBA for 90 seconds while performing a dynamic task with other UE to show improvements in LE strength and balance for age appropriate functional positions.   Date Initiated: 12/7/2021  Duration: 6 months  Status: Initiated    Comments:   12/7/2021:  Pt progressed to 60 seconds with B UE support and SBA.   1/11/2022: Pt completed 60 seconds again on this date progressing to 1-2 UE support with SBA.    Goal: Autumn will demonstrate the ability to tall kneel with 0 UE support for 5 seconds with SBA to show improvements in core and hip strength for gross motor skills.   Date Initiated: 12/7/2021  Duration: 6 months  Status: continue     Comments:   12/7/2021: Pt is able to complete with no UE support and min A at hips for 5 seconds.   1/11/2022: Pt continues to require at least min A at this time.       Goal: Claudia with demonstrate the ability complete a sit to stand from a child size  bench with mod A at hips to show improvements in LE strength for standing.   Date Initiated: 12/7/2021  Duration: 6 months  Status: continue     Comments:   12/7/2021: Pt requires max A at this time.   1/11/2022: Pt requires max A to complete.    Goal: Claudia will demonstrate the ability to ambulate 70' with PRW and SBA to show improvement in strength and endurance for functional mobility.   Date Initiated: 12/7/2021  Duration: 6 months  Status: initiated      Comments:   12/7/2021: Pt is able to complete with min A for forward advancement of PRW and close supervision for fatigue.   1/11/2022: Pt is able to complete with min A for forward advancement and close supervision x 70'.   Goal: Claudia will progress her raw scores in 2/3 sections on the PDMS by at least 2 points to show significant improvements in her gross motor skills.    Date Initiated: 12/7/2021  Duration: 6 months  Status: Initiate   Comments:   12/7/2021: Pt demonstrates total scores of a 33 in stationary skills, a 32 locomotor skills, and a 4 in objection manipulation at this time.             Plan   Continue PT treatment for ROM and stretching, strengthening, balance activities, gross motor developmental activities, gait training, transfer training, cardiovascular/endurance training, patient education, family training, progression of home exercise program. Pt will be progressed to transitions to get in and out of sitting next week.      Certification Period: 12/7/2021 to 6/7/2022    Melody Rock, PT, DPT   2/1/2022               Car

## 2022-02-03 ENCOUNTER — NON-APPOINTMENT (OUTPATIENT)
Age: 32
End: 2022-02-03

## 2022-05-16 ENCOUNTER — APPOINTMENT (OUTPATIENT)
Dept: OBGYN | Facility: CLINIC | Age: 32
End: 2022-05-16
Payer: MEDICAID

## 2022-05-16 VITALS
HEIGHT: 62 IN | BODY MASS INDEX: 37.38 KG/M2 | WEIGHT: 203.13 LBS | DIASTOLIC BLOOD PRESSURE: 75 MMHG | SYSTOLIC BLOOD PRESSURE: 110 MMHG

## 2022-05-16 PROCEDURE — 99395 PREV VISIT EST AGE 18-39: CPT

## 2022-05-16 NOTE — PHYSICAL EXAM
[Chaperone Present] : A chaperone was present in the examining room during all aspects of the physical examination [FreeTextEntry1] : Lo [Appropriately responsive] : appropriately responsive [Alert] : alert [No Acute Distress] : no acute distress [No Lymphadenopathy] : no lymphadenopathy [Regular Rate Rhythm] : regular rate rhythm [No Murmurs] : no murmurs [Clear to Auscultation B/L] : clear to auscultation bilaterally [Soft] : soft [Non-tender] : non-tender [Non-distended] : non-distended [No HSM] : No HSM [No Lesions] : no lesions [No Mass] : no mass [Oriented x3] : oriented x3 [Examination Of The Breasts] : a normal appearance [No Masses] : no breast masses were palpable [Labia Majora] : normal [Labia Minora] : normal [Normal] : normal [Uterine Adnexae] : normal

## 2022-05-19 LAB — CYTOLOGY CVX/VAG DOC THIN PREP: NORMAL

## 2022-05-20 NOTE — OB RN DELIVERY SUMMARY - NS_NUCHALCORDOTHER_OBGYN_ALL_OB_FT
body
You can access the FollowMyHealth Patient Portal offered by [facility] by registering at the following website: [email or web address]. By joining [employer]’s FollowMyHealth portal, you will also be able to view your health information using other applications (apps) compatible with our system.

## 2022-07-13 ENCOUNTER — NON-APPOINTMENT (OUTPATIENT)
Age: 32
End: 2022-07-13

## 2022-08-03 NOTE — OB RN DELIVERY SUMMARY - AS DELIV COMPLICATIONS OB
[facility]  Cardiology Progress Note    Patient Identification:  Name: Flo Manzo  Age: 64 y.o.  Sex: male  :  1958  MRN: [id number]                 Follow Up / Chief Complaint: atrial fibrillation with rvr    Interval History: Per neuro refusing of MRI to rule out stroke.   Heart rate controlled.  Blood pressure stable.  Awaiting bed availability.     Subjective: Denies chest pain and palpitations.      Objective:    Past Medical History:  Past Medical History:   Diagnosis Date   • Arthritis    • Atrial fibrillation with RVR (HCC) 2019   • Colon polyps 2018    Hepatic flexure: tubular adenoma, only low-grade dysplasia; splenic flexure: tubular adenoma, only low-grade dysplasia; sigmoid colon: tubular adenoma, multiple fragments only low-grade dysplasia   • Depression    • Heart murmur    • Hemorrhagic stroke (HCC) 2019   • Hypertension    • New onset atrial fibrillation (CMS/HCC) - RVR 2019   • GÓMEZ (obstructive sleep apnea) 2019    Home sleep study with moderate severity GÓMEZ, AHI 20 events per hour.  Sleep-related hypoxia with low O2 saturation 84% for 14 minutes.   • Sleep apnea     previously diagnosed with sleep apnea, had T&A done and it has since resolved    • Stroke (HCC)    • Uncontrolled hypertension    • Ventral hernia      Past Surgical History:  Past Surgical History:   Procedure Laterality Date   • APPENDECTOMY N/A    • BACK SURGERY      BLADDER STIMULATOR IMPLANT L LOWER BACK   • CARDIAC CATHETERIZATION N/A 2004    Cath left ventriculography, coronary angiography and left heart catheterization, Normal results-Dr. Vadim Mancuso   • CARDIAC CATHETERIZATION N/A 2019    Procedure: Left Heart Cath;  Surgeon: Eren Bruce MD;  Location: [facility] INVASIVE LOCATION;  Service: Cardiology   • CARDIAC CATHETERIZATION N/A 2019    Procedure: Left ventriculography;  Surgeon: Eren Bruce MD;  Location: [facility] CATH  INVASIVE LOCATION;  Service: Cardiology   • CARDIAC CATHETERIZATION N/A 1/29/2019    Procedure: Coronary angiography;  Surgeon: Eren Bruce MD;  Location:  [facility] CATH INVASIVE LOCATION;  Service: Cardiology   • CARDIAC ELECTROPHYSIOLOGY PROCEDURE N/A 3/4/2022    Procedure: Pacemaker SC new BIOTRONIK;  Surgeon: Eren Bruce MD;  Location:  [facility] CATH INVASIVE LOCATION;  Service: Cardiology;  Laterality: N/A;   • CARPAL TUNNEL RELEASE Right 2013   • CARPAL TUNNEL RELEASE Left    • COLONOSCOPY N/A 1/4/2018    Procedure: COLONOSCOPY with cold polypectomy and hot snare polypectomy;  Surgeon: Ten Solitario MD;  Location: [facility] ENDOSCOPY;  Service:    • EYE LENS IMPLANT SECONDARY Bilateral 2007, 2008   • KNEE CARTILAGE SURGERY Right 1979   • TONSILLECTOMY AND ADENOIDECTOMY Bilateral 2005   • TOTAL KNEE ARTHROPLASTY Left 03/14/2016    Left total knee arthroplasty with Amberly component, size 11 femur, G tibial baseplate with a 10 polyethylene insert and a 38 patellar button-Dr. Pablo Hairston   • TOTAL KNEE ARTHROPLASTY Right 03/02/2015    Right total knee arthroplasty with Amberly component size G femur, 11 tibial base plate with an 11 polyethylene insert and a 38 patellar button-Dr. Pablo Hairston   • UVULOPALATOPHARYNGOPLASTY N/A 2005    part of soft palate, and uvula   • VENTRAL HERNIA REPAIR N/A 1/23/2018    Procedure: VENTRAL HERNIA REPAIR LAPAROSCOPIC WITH DAVINCI ROBOT AND MESH;  Surgeon: Ten Solitario MD;  Location: [facility];  Service:         Social History:   Social History     Tobacco Use   • Smoking status: Never Smoker   • Smokeless tobacco: Never Used   Substance Use Topics   • Alcohol use: Not Currently     Comment: social, maybe a drink a month, not since stroke      Family History:  Family History   Problem Relation Age of Onset   • Hypertension Mother    • Kidney failure Mother    • Coronary artery disease Father    • Lung cancer Father         positive smoker   • Heart  "attack Father    • Breast cancer Sister 60   • Prostate cancer Brother    • Colon polyps Brother    • Kidney nephrosis Brother    • Malig Hyperthermia Neg Hx           Allergies:  Allergies   Allergen Reactions   • Poison Ivy Extract Hives, Itching and Rash     ITCHY BLISTERS     Scheduled Meds:  amLODIPine, 10 mg, Q24H  apixaban, 5 mg, Q12H  atorvastatin, 40 mg, Nightly  lisinopril, 20 mg, Q24H  metoprolol tartrate, 25 mg, BID  pantoprazole, 40 mg, QAM  sodium chloride, 10 mL, Q12H  tamsulosin, 0.4 mg, Daily            INTAKE AND OUTPUT:    Intake/Output Summary (Last 24 hours) at 8/3/2022 1448  Last data filed at 8/3/2022 1304  Gross per 24 hour   Intake 942 ml   Output --   Net 942 ml     ROS  Constitutional: Awake and alert, no fever.  No nosebleeds  Abdomen        no abdominal pain  Cardiac           no chest pain  Pulmonary      no shortness of breath      /79 (BP Location: Right arm, Patient Position: Lying)   Pulse 79   Temp 98.4 °F (36.9 °C) (Oral)   Resp 20   Ht 177.8 cm (70\")   Wt 104 kg (230 lb)   SpO2 95%   BMI 33.00 kg/m²             Physical Exam:  General:  Appears in no acute distress resting in bed  Eyes: EOM normal no conjunctival drainage  HEENT:  No JVD. Thyroid not visibly enlarged. No mucosal pallor or cyanosis  Respiratory: Respirations regular and unlabored at rest. BBS with good air entry in all fields. No crackles, rubs or wheezes auscultated  Cardiovascular: S1S2 irregularly irregular rhythm. No murmur, rub or gallop. No carotid bruits. DP/PT pulses     . No pretibial pitting edema  Gastrointestinal: Abdomen soft, flat, non tender. Bowel sounds present. No hepatosplenomegaly. No ascites  Skin:   Skin warm and dry to touch. No rashes    Neuro: AAO x3 CN II-XII grossly intact  Psych: Mood and affect normal, pleasant and cooperative          I reviewed the patient's new clinical results, and personally reviewed and interpreted the patient's ECG and telemetry data from the last " 24 hours        Cardiographics  Telemetry:     paced      Paced. afib    ECG:     Echocardiogram:   Interpretation Summary    · Calculated left ventricular EF = 54.7% Estimated left ventricular EF = 55% Estimated left ventricular EF was in agreement with the calculated left ventricular EF. Left ventricular systolic function is normal. Normal left ventricular cavity size noted. Left ventricular wall thickness is consistent with moderate concentric hypertrophy. All left ventricular wall segments contract normally. Left ventricular diastolic function was indeterminate.  · Left atrial volume is mildly increased.  · An electronic lead is present in the right atrium.  · The aortic valve is abnormal in structure. There is severe calcification of the aortic valve. Mild aortic valve regurgitation is present. Mild aortic valve stenosis is present. Aortic valve area is 1.4 cm2. Aortic valve mean pressure gradient is 18.9 mmHg. Aortic valve dimensionless index is 0.40 .  · Severe mitral annular calcification is present. Trace mitral valve regurgitation is present.  · Trace tricuspid valve regurgitation is present. Estimated right ventricular systolic pressure from tricuspid regurgitation is normal (<35 mmHg). Calculated right ventricular systolic pressure from tricuspid regurgitation is 27 mmHg.      2021  Interpretation Summary       · Findings consistent with a normal ECG stress test.  · Left ventricular ejection fraction is normal. (Calculated EF = 54%).  · Myocardial perfusion imaging indicates a normal myocardial perfusion study with no evidence of ischemia.  · Impressions are consistent with a low risk study.     Asymptomatic for chest pain. ECG is negative for ischemia.   Ectopy: none  Afib through entirety of stress test  Hypotensive BP after lexiscan administration 104/66. BP at 1min recovery 90/60, BP at 2min recovery 100/74   Supervised by:  Elaine BURRELL.       2022    Conclusion       · Successful  single-chamber pacemaker implantation       2019  Impression:      1. Normal coronary arteries  2. Normal LV gram     Recommendations:      1. Medical management            I sincerely appreciate the opportunity to participate in your patient's care. Please feel free to contact me anytime if I can be of assistance in this or any other way.     Eren Bruce MD  2/1/2019  9:20 AM           Lab Review   Results from last 7 days   Lab Units 07/30/22  0727 07/29/22  1935 07/29/22  1529   CK TOTAL U/L 233*  --   --    TROPONIN T ng/mL  --  <0.010 <0.010     Results from last 7 days   Lab Units 08/03/22  0726   MAGNESIUM mg/dL 2.7*     Results from last 7 days   Lab Units 08/03/22  0726   SODIUM mmol/L 136   POTASSIUM mmol/L 3.6   BUN mg/dL 20   CREATININE mg/dL 0.82   CALCIUM mg/dL 9.3        Results from last 7 days   Lab Units 08/03/22  0726 08/02/22  1425 08/01/22  0956   WBC 10*3/mm3 8.97 9.14 8.65   HEMOGLOBIN g/dL 15.0 14.7 14.5   HEMATOCRIT % 46.8 46.2 43.6   PLATELETS 10*3/mm3 200 228 218     Results from last 7 days   Lab Units 07/30/22  0727 07/29/22  1529   INR  1.08 1.08   APTT seconds  --  28.8       CHADS-VASc Risk Assessment              5 Total Score    1 CHF    1 Hypertension    2 PRIOR STROKE/TIA/THROMBO    1 Age 65-74        Criteria that do not apply:    Age >/= 75    DM    Vascular Disease    Sex: Female          The following medical decision was discussed in detail with Dr. Bruce      Assessment:  1. Chronic atrial fibrillation: Noted with RVR on admission: Now with controlled rate.   TSH 0.023, defer to attending.  Continue anticoagulation and beta-blockade.  2.  Essential hypertension: Better controlled.  Continue amlodipine, lisinopril and beta-blockade.  3.  Diastolic congestive heart failure, chronic: At home on Lotrel.  Continue lisinopril and beta-blockade.  Euvolemic on exam.    Creatinine and GFR stable.    4. GÓMEZ on CPAP  5. SSS with ICD  6. Weakness  7.  Nonrheumatic  "aortic valve: EF 55%, see full report as above.      Plan:  Blood pressure and heart rate are stable.  Echo revealed EF 55%, see full report as above.  No further work-up at this time.  Continue Eliquis, statin, beta-blockade, lisinopril and amlodipine.  He will follow-up with me on August 31 at 9 AM.            )8/3/2022  ASHANTI Recinos/Transcription:   \"Dictated utilizing Dragon dictation\".     " none

## 2023-01-19 ENCOUNTER — RESULT CHARGE (OUTPATIENT)
Age: 33
End: 2023-01-19

## 2023-01-19 ENCOUNTER — APPOINTMENT (OUTPATIENT)
Dept: OBGYN | Facility: CLINIC | Age: 33
End: 2023-01-19
Payer: MEDICAID

## 2023-01-19 VITALS
BODY MASS INDEX: 39.51 KG/M2 | DIASTOLIC BLOOD PRESSURE: 84 MMHG | WEIGHT: 214.7 LBS | HEIGHT: 62 IN | SYSTOLIC BLOOD PRESSURE: 122 MMHG

## 2023-01-19 LAB
HCG UR QL: POSITIVE
QUALITY CONTROL: NO

## 2023-01-19 PROCEDURE — 99214 OFFICE O/P EST MOD 30 MIN: CPT

## 2023-01-19 PROCEDURE — 81025 URINE PREGNANCY TEST: CPT

## 2023-01-19 RX ORDER — PRENATAL VIT NO.130/IRON/FOLIC 27MG-0.8MG
28-0.8 TABLET ORAL
Qty: 90 | Refills: 0 | Status: ACTIVE | COMMUNITY
Start: 2023-01-19 | End: 1900-01-01

## 2023-01-21 LAB
C TRACH RRNA SPEC QL NAA+PROBE: NOT DETECTED
N GONORRHOEA RRNA SPEC QL NAA+PROBE: NOT DETECTED
SOURCE AMPLIFICATION: NORMAL

## 2023-01-27 ENCOUNTER — APPOINTMENT (OUTPATIENT)
Dept: ANTEPARTUM | Facility: CLINIC | Age: 33
End: 2023-01-27
Payer: MEDICAID

## 2023-01-27 ENCOUNTER — ASOB RESULT (OUTPATIENT)
Age: 33
End: 2023-01-27

## 2023-01-27 DIAGNOSIS — Z36.82 ENCOUNTER FOR ANTENATAL SCREENING FOR NUCHAL TRANSLUCENCY: ICD-10-CM

## 2023-01-27 PROCEDURE — 76813 OB US NUCHAL MEAS 1 GEST: CPT

## 2023-01-31 LAB
ADDITIONAL US: NORMAL
COMMENTS: AFP: NORMAL
CRL SCAN TWIN B: NORMAL
CRL SCAN: NORMAL
CROWN RUMP LENGTH TWIN B: NORMAL
CROWN RUMP LENGTH: 53.4 MM
DOWN SYNDROME AGE RISK: NORMAL
DOWN SYNDROME INTERPRETATION: NORMAL
DOWN SYNDROME SCREENING RISK: NORMAL
GEST. AGE ON COLLECTION DATE: 11.9 WEEKS
HCG MOM: 0.43
HCG VALUE: 36.4 IU/ML
MATERNAL AGE AT EDD: 35 YR
NOTE: AFP: NORMAL
NT MOM TWIN B: NORMAL
NT TWIN B: NORMAL
NUCHAL TRANSLUCENCY (NT): 1.4 MM
NUCHAL TRANSLUCENCY MOM: 1.09
NUMBER OF FETUSES: 1
PAPP-A MOM: 0.28
PAPP-A VALUE: 133.3 NG/ML
RACE: NORMAL
RESULTS AFP: NORMAL
SONOGRAPHER ID#: NORMAL
SUBMIT PART 2 SAMPLE USING: NORMAL
TEST RESULTS: AFP: NORMAL
TRISOMY 18 AGE RISK: NORMAL
TRISOMY 18 INTERPRETATION: NORMAL
TRISOMY 18 SCREENING RISK: NORMAL
WEIGHT AFP: 215 LBS

## 2023-02-02 ENCOUNTER — APPOINTMENT (OUTPATIENT)
Dept: OBGYN | Facility: CLINIC | Age: 33
End: 2023-02-02
Payer: MEDICAID

## 2023-02-02 VITALS
SYSTOLIC BLOOD PRESSURE: 115 MMHG | DIASTOLIC BLOOD PRESSURE: 71 MMHG | BODY MASS INDEX: 40.01 KG/M2 | HEIGHT: 62 IN | WEIGHT: 217.44 LBS | HEART RATE: 80 BPM

## 2023-02-02 DIAGNOSIS — Z34.90 ENCOUNTER FOR SUPERVISION OF NORMAL PREGNANCY, UNSPECIFIED, UNSPECIFIED TRIMESTER: ICD-10-CM

## 2023-02-02 DIAGNOSIS — Z01.419 ENCOUNTER FOR GYNECOLOGICAL EXAMINATION (GENERAL) (ROUTINE) W/OUT ABNORMAL FINDINGS: ICD-10-CM

## 2023-02-02 DIAGNOSIS — N91.1 SECONDARY AMENORRHEA: ICD-10-CM

## 2023-02-02 DIAGNOSIS — R05.8 OTHER SPECIFIED COUGH: ICD-10-CM

## 2023-02-02 DIAGNOSIS — Z87.898 PERSONAL HISTORY OF OTHER SPECIFIED CONDITIONS: ICD-10-CM

## 2023-02-02 PROCEDURE — 0502F SUBSEQUENT PRENATAL CARE: CPT

## 2023-02-02 RX ORDER — TERCONAZOLE 4 MG/G
0.4 CREAM VAGINAL DAILY
Qty: 1 | Refills: 3 | Status: COMPLETED | COMMUNITY
Start: 2023-01-19 | End: 2023-02-02

## 2023-02-02 RX ORDER — ONDANSETRON 4 MG/1
4 TABLET ORAL
Qty: 2 | Refills: 2 | Status: COMPLETED | COMMUNITY
Start: 2021-08-27 | End: 2023-02-02

## 2023-02-02 RX ORDER — PRENATAL VIT NO.130/IRON/FOLIC 27MG-0.8MG
28-0.8 TABLET ORAL DAILY
Qty: 90 | Refills: 3 | Status: COMPLETED | COMMUNITY
Start: 2021-03-29 | End: 2023-02-02

## 2023-02-02 RX ORDER — AZITHROMYCIN 250 MG/1
250 TABLET, FILM COATED ORAL
Qty: 6 | Refills: 0 | Status: COMPLETED | COMMUNITY
Start: 2022-01-04 | End: 2023-02-02

## 2023-02-03 LAB
ABO + RH PNL BLD: NORMAL
ALBUMIN SERPL ELPH-MCNC: 3.7 G/DL
ALP BLD-CCNC: 92 U/L
ALT SERPL-CCNC: 12 U/L
ANION GAP SERPL CALC-SCNC: 14 MMOL/L
APPEARANCE: CLEAR
AST SERPL-CCNC: 14 U/L
BASOPHILS # BLD AUTO: 0.02 K/UL
BASOPHILS NFR BLD AUTO: 0.2 %
BILIRUB SERPL-MCNC: 0.2 MG/DL
BILIRUBIN URINE: NEGATIVE
BLD GP AB SCN SERPL QL: NORMAL
BLOOD URINE: NEGATIVE
BUN SERPL-MCNC: 8 MG/DL
CALCIUM SERPL-MCNC: 8.7 MG/DL
CHLORIDE SERPL-SCNC: 101 MMOL/L
CO2 SERPL-SCNC: 22 MMOL/L
COLOR: NORMAL
CREAT SERPL-MCNC: 0.58 MG/DL
EGFR: 122 ML/MIN/1.73M2
EOSINOPHIL # BLD AUTO: 0.14 K/UL
EOSINOPHIL NFR BLD AUTO: 1.5 %
ESTIMATED AVERAGE GLUCOSE: 120 MG/DL
GLUCOSE QUALITATIVE U: NEGATIVE
GLUCOSE SERPL-MCNC: 51 MG/DL
HBA1C MFR BLD HPLC: 5.8 %
HBV SURFACE AG SER QL: NONREACTIVE
HCT VFR BLD CALC: 39 %
HCV AB SER QL: NONREACTIVE
HCV S/CO RATIO: 0.13 S/CO
HGB A MFR BLD: 97.4 %
HGB A2 MFR BLD: 2.6 %
HGB BLD-MCNC: 12.5 G/DL
HGB FRACT BLD-IMP: NORMAL
HIV1+2 AB SPEC QL IA.RAPID: NONREACTIVE
IMM GRANULOCYTES NFR BLD AUTO: 0.2 %
KETONES URINE: NEGATIVE
LEUKOCYTE ESTERASE URINE: NEGATIVE
LYMPHOCYTES # BLD AUTO: 1.56 K/UL
LYMPHOCYTES NFR BLD AUTO: 16.5 %
MAN DIFF?: NORMAL
MCHC RBC-ENTMCNC: 29.7 PG
MCHC RBC-ENTMCNC: 32.1 GM/DL
MCV RBC AUTO: 92.6 FL
MONOCYTES # BLD AUTO: 0.53 K/UL
MONOCYTES NFR BLD AUTO: 5.6 %
NEUTROPHILS # BLD AUTO: 7.18 K/UL
NEUTROPHILS NFR BLD AUTO: 76 %
NITRITE URINE: NEGATIVE
PH URINE: 6
PLATELET # BLD AUTO: 206 K/UL
POTASSIUM SERPL-SCNC: 4.6 MMOL/L
PROT SERPL-MCNC: 6.5 G/DL
PROTEIN URINE: NEGATIVE
RBC # BLD: 4.21 M/UL
RBC # FLD: 13 %
SODIUM SERPL-SCNC: 136 MMOL/L
SPECIFIC GRAVITY URINE: 1.01
TSH SERPL-ACNC: 1.26 UIU/ML
UROBILINOGEN URINE: NORMAL
WBC # FLD AUTO: 9.45 K/UL

## 2023-02-04 LAB
CMV IGG SERPL QL: >10 U/ML
CMV IGG SERPL-IMP: POSITIVE
CMV IGM SERPL QL: <8 AU/ML
CMV IGM SERPL QL: NEGATIVE
MEV IGG FLD QL IA: 25.7 AU/ML
MEV IGG+IGM SER-IMP: POSITIVE
MUV AB SER-ACNC: NORMAL
MUV IGG SER QL IA: 10 AU/ML
RUBV IGG FLD-ACNC: 3 INDEX
RUBV IGG SER-IMP: POSITIVE
T GONDII AB SER-IMP: NEGATIVE
T GONDII AB SER-IMP: NEGATIVE
T GONDII IGG SER QL: <3 IU/ML
T GONDII IGM SER QL: <3 AU/ML
T PALLIDUM AB SER QL IA: NEGATIVE
VZV AB TITR SER: POSITIVE
VZV IGG SER IF-ACNC: 187.5 INDEX

## 2023-02-07 LAB
LEAD BLD-MCNC: <1 UG/DL
M TB IFN-G BLD-IMP: NEGATIVE
QUANTIFERON TB PLUS MITOGEN MINUS NIL: 2.78 IU/ML
QUANTIFERON TB PLUS NIL: 0.02 IU/ML
QUANTIFERON TB PLUS TB1 MINUS NIL: 0 IU/ML
QUANTIFERON TB PLUS TB2 MINUS NIL: 0.02 IU/ML

## 2023-02-08 LAB
B19V IGG SER QL IA: 1.12 INDEX
B19V IGG+IGM SER-IMP: NORMAL
B19V IGG+IGM SER-IMP: POSITIVE
B19V IGM FLD-ACNC: 0.2 INDEX
B19V IGM SER-ACNC: NEGATIVE

## 2023-02-16 ENCOUNTER — APPOINTMENT (OUTPATIENT)
Dept: OBGYN | Facility: CLINIC | Age: 33
End: 2023-02-16
Payer: MEDICAID

## 2023-02-16 VITALS
HEIGHT: 62 IN | WEIGHT: 217 LBS | BODY MASS INDEX: 39.93 KG/M2 | DIASTOLIC BLOOD PRESSURE: 70 MMHG | SYSTOLIC BLOOD PRESSURE: 128 MMHG

## 2023-02-16 PROCEDURE — 0502F SUBSEQUENT PRENATAL CARE: CPT

## 2023-02-24 ENCOUNTER — NON-APPOINTMENT (OUTPATIENT)
Age: 33
End: 2023-02-24

## 2023-02-24 ENCOUNTER — TRANSCRIPTION ENCOUNTER (OUTPATIENT)
Age: 33
End: 2023-02-24

## 2023-02-24 ENCOUNTER — APPOINTMENT (OUTPATIENT)
Dept: MATERNAL FETAL MEDICINE | Facility: CLINIC | Age: 33
End: 2023-02-24

## 2023-02-28 ENCOUNTER — NON-APPOINTMENT (OUTPATIENT)
Age: 33
End: 2023-02-28

## 2023-02-28 DIAGNOSIS — Z3A.15 15 WEEKS GESTATION OF PREGNANCY: ICD-10-CM

## 2023-02-28 DIAGNOSIS — Z36.8A ENCOUNTER FOR ANTENATAL SCREENING FOR OTHER GENETIC DEFECTS: ICD-10-CM

## 2023-02-28 DIAGNOSIS — Z3A.12 12 WEEKS GESTATION OF PREGNANCY: ICD-10-CM

## 2023-02-28 DIAGNOSIS — Z34.91 ENCOUNTER FOR SUPERVISION OF NORMAL PREGNANCY, UNSPECIFIED, FIRST TRIMESTER: ICD-10-CM

## 2023-03-06 ENCOUNTER — APPOINTMENT (OUTPATIENT)
Dept: OBGYN | Facility: CLINIC | Age: 33
End: 2023-03-06
Payer: MEDICAID

## 2023-03-06 VITALS
DIASTOLIC BLOOD PRESSURE: 84 MMHG | WEIGHT: 218.7 LBS | BODY MASS INDEX: 40.25 KG/M2 | HEIGHT: 62 IN | SYSTOLIC BLOOD PRESSURE: 120 MMHG

## 2023-03-06 DIAGNOSIS — B37.31 ACUTE CANDIDIASIS OF VULVA AND VAGINA: ICD-10-CM

## 2023-03-06 PROCEDURE — 0502F SUBSEQUENT PRENATAL CARE: CPT

## 2023-03-20 NOTE — DISCHARGE NOTE OB - AFTER URINATION AND/OR BOWEL MOVEMENT, CLEAN WITH WARM WATER USING A PERI- BOTTLE, THEN PAT DRY WITH TOILET TISSUE
Occupational Therapy - 9S  Frequency Comments: MTF 1/3-5 by 3/27  Visit Type: treatment  Precautions:  Medical precautions:  fall risk; standard precautions.    3/15: L VATS, drainage of pleural effusion  No lifting greater than 10 lbs for 2 weeks  Hx of Amyloidosis, Merkel Cell Cancer      Lines:     Basic: telemetry, O2, capped IV and central line (2L NC)    Complex: chest tubes with suction      Lines in chart and on patient reviewed, precautions maintained throughout session.  Hearing: no hearing deficits  Vision:     Current vision: wears glasses all the time  Safety Measures: chair alarm (call light)  SUBJECTIVE  Patient agreed to participate in therapy this date.  Patient verbally agrees to allow the following to be present during session: friend    \"I do the same walk every time\"    RNNalini, approved therapy session. Pt agreeable to therapy. Pt resting in recliner with all needs in reach and alarm activated at end of session.  Patient / Family Goal: return to previous functional status, maximize function and return home    Pain   RN informed on pain level     OBJECTIVE     Cognitive Status   Level of Consciousness   - alert  Arousal Alertness   - appropriate responses to stimuli  Affect/Behavior    - cooperative and pleasant  Orientation    - Oriented to: person, place, time and situation  Functional Communication   - Overall Status: within functional limits   - Forms of Communication: verbal  Attention Span    - Attention: appears intact  Following Direction   - follows all commands and directions consistently  Memory   - appears intact  Safety Awareness/Insight   - decreased awareness of need for safety  Awareness of Deficits   - assistance required to compensate for deficits    Patient Activity Tolerance: 1 to 1 activity to rest    Respiratory: increased respiration rate    Educated pt on pursed lip breathing and demonstrating breathing technique. Reviewed 2:4 ratio for breathing in through nose for 2  seconds and blowing out through mouth for 4 seconds in hopes to reduce SOB. Pt able to replicate technique, however required verbal cues or demonstrate to initiate breathing technique. Continue to review pursed lip breathing with pt.       Range of Motion (ROM)   (degrees unless noted; active unless noted; norms in ( ); negative=lacking to 0, positive=beyond 0)  WFL: LUE, RUE      Sitting Balance  (UTE = base of support)  Static      - Trial 1 details: supervision  Dynamic      - Trial 1 details: supervision         Bed Mobility  - Supine to sit: supervision  Transfers  Assistive devices: gait belt (O2 cart)  - Sit to stand: minimal assist  - Stand to sit: minimal assist        Functional Ambulation  - Assistance: supervision and minimal assist  - Assistive device: gait belt (O2 cart)  - Distance (ft):250; 250; 250  - Surface: even    Pt required supervision with supine to sit transfer, sit <> stand transfers, and static standing secondary to weakness, fatigue, and SOB. Pt required supervision to min assist with dynamic standing and ambulation of about 250 ft x3 with 2 long standing rest breaks and 1 seated rest break in-between ambulation sets secondary to weakness, fatigue, deconditioning, SOB, and safety concerns.  Activities of Daily Living (ADLs)  Eating:   - Assist: independent  - Position: chair  - Assist needed for: beverage management  Lower Body Dressing:   - Assist: set up and minimal assist  - Position: edge of bed  - Assist needed for: thread right lower extremity into pants, thread left lower extremity into pants, pull up over hips and fasteners    Pt required setup to min assist with LE dressing (pants) secondary to weakness, fatigue, and deconditioning. Pt declined further self cares at this time due to fatigue and having visitors.  Interventions    Training provided: activity tolerance, balance retraining, bed mobility training, breathing/relaxation, compensatory techniques, functional ambulation,  transfer training, safety training and ADL training    Educated pt on purse lip breathing and demonstrating breathing technique. Continue to review purse lip breathing with pt. Educated pt on the importance of activity, balance of activity and rest, and the benefits of ambulating to bathroom and sitting up in recliner for all meals to promote activity tolerance and overall health benefits. Pt receptive to education.     Writer review how to use incentive spirometer and benefits for spirometer for promoting good lung help. Pt receptive to education. Pt able to demonstrate fair understanding of incentive spirometer. Recommend further reinforcement / education with pt. RN aware.    Skilled input: verbal instruction/cues  Verbal Consent: Writer verbally educated and received verbal consent for hand placement, positioning of patient, and techniques to be performed today from patient for therapist position for techniques as described above and how they are pertinent to the patient's plan of care.         ASSESSMENT  Impairments: activity tolerance, balance, bed mobility, decreased insight into deficits, safety awareness, strength and cardiovascular endurance  Functional Limitations: bed mobility, functional mobility, eating, grooming, bathing, toileting, functional transfers, dressing and showering    Pt agreeable to therapy session. Pt denies pain during session. RN aware. Treatment today focused on transfers, bed mobility, standing tolerance/balance, ambulation, LE dressing, and education on role of therapy. Current overall ADL status is setup to min assist with LE dressing (pants). Pt declined further self cares at this time due to fatigue and having visitors. Current functional mobility for ADL and Instrumental-ADL tasks is supervision with supine to sit transfer, sit <> stand transfers, and static standing and supervision to min assist with dynamic standing and ambulation of about 250 ft x3 with 2 long standing rest  breaks and 1 seated rest break in-between ambulation sets.    Educated pt on purse lip breathing and demonstrating breathing technique. Continue to review purse lip breathing with pt. Writer review how to use incentive spirometer and benefits for spirometer for promoting good lung help. Pt receptive to education. Pt able to demonstrate fair understanding of incentive spirometer. Recommend further reinforcement / education with pt. RN aware. Patient displays fair progress demonstrated by ability to participate in therapy session. Limitations at this time include weakness, fatigue, medical status, carryover of techniques taught from one session to the next and SOB. Patient will benefit from further skilled OT for continued training with self cares and functional mobility to help the patient meet goal of returning home.  Recommended Consults: OT: PT    Discharge Recommendations:  Recommendation for Discharge Location: OT WI: Home with Home therapy, Pending functional progress  Recommendation for Discharge Support: OT WI: Intermittent assist daily, Assistance with IADLs  PT/OT Mobility Equipment for Discharge: Continue to assess, may benefit from 2WW  PT/OT ADL Equipment for Discharge: continue to assess  OT Identified Barriers to Discharge: medical, safety, fall risk, SOB, lives alone, decreased activity tolerance  Progress: progressing toward goals    • Skilled therapy is required to address these limitations in attempt to maximize the patient's independence.    Education:   - Present and ready to learn: patient  Education provided during session:  - Role of OT  ADLs and functional mobility  Pursed lip breathing  - Results of above outlined education: Demonstrates understanding    Patient at End of Session:   Location: in chair  Safety measures: lines intact, call light within reach and alarm system in place/re-engaged  Handoff to: nurse      I was in the immediate presence of the student and directed the student’s  performance of the services. I am responsible for all treatment, assessment, documentation, and billing rendered for this patient.  Yanely Fletcher OT        PLAN  Suggestions for next session as indicated: Bathing at sink, toileting, progress transfers/standing tolerance, oral cares / grooming in standing, LE dressing (socks), item retrieval    Frequency Comments: MTF 1/3-5 by 3/27    Interventions: activity tolerance training, ADL retraining, balance, bed mobility training, coordination, functional transfer training, patient education, transfer training, therapeutic exercise, upper extremity strengthening/ROM, compensatory techniques, compensatory technique education, therapeutic activity, safety training, edema management, energy conservation and equipment eval/education  Agreement to plan and goals: patient agrees with goals and treatment plan      GOALS  Review Date: 3/27/2023  Long Term Goals: (to be met by time of discharge from hospital)  Grooming: Patient will complete grooming tasks in standing and at sink modified independent.  Status: progressing/ongoing  Upper body dressing: Patient will complete upper body dressing in sitting and at bedside modified independent.  Status: progressing/ongoing  Lower body dressing: Patient will complete lower body dressing in sitting and at bedside modified independent.  Status: progressing/ongoing  Toileting: Patient will complete toileting modified independent.  Status: progressing/ongoing  Bathing: Patient will complete bathingmodified independent  Status: progressing/ongoingToilet transfer: Patient will complete toilet transfer with modified independent. Status: progressing/ongoing  Home setting transfer: Patient will complete home setting transfers with modified independent.  Status: progressing/ongoing        Documented in the chart in the following areas: Pain. Assessment. Plan. Patient Education.      Therapy procedure time and total treatment time can be  found documented on the Time Entry flowsheet   Statement Selected

## 2023-03-27 ENCOUNTER — APPOINTMENT (OUTPATIENT)
Dept: OBGYN | Facility: CLINIC | Age: 33
End: 2023-03-27
Payer: MEDICAID

## 2023-03-27 VITALS
SYSTOLIC BLOOD PRESSURE: 100 MMHG | BODY MASS INDEX: 40.54 KG/M2 | DIASTOLIC BLOOD PRESSURE: 50 MMHG | HEIGHT: 62 IN | WEIGHT: 220.31 LBS

## 2023-03-27 PROCEDURE — 0502F SUBSEQUENT PRENATAL CARE: CPT

## 2023-03-28 ENCOUNTER — ASOB RESULT (OUTPATIENT)
Age: 33
End: 2023-03-28

## 2023-03-28 ENCOUNTER — APPOINTMENT (OUTPATIENT)
Dept: ANTEPARTUM | Facility: CLINIC | Age: 33
End: 2023-03-28
Payer: MEDICAID

## 2023-03-28 PROCEDURE — 76811 OB US DETAILED SNGL FETUS: CPT

## 2023-03-28 PROCEDURE — 76817 TRANSVAGINAL US OBSTETRIC: CPT | Mod: 59

## 2023-04-04 DIAGNOSIS — Z3A.20 20 WEEKS GESTATION OF PREGNANCY: ICD-10-CM

## 2023-04-05 PROBLEM — Z67.91 BLOOD TYPE, RH NEGATIVE: Status: ACTIVE | Noted: 2018-06-01

## 2023-04-05 LAB
ADDITIONAL US: NORMAL
AFP MOM: 0.83
AFP VALUE: 35 NG/ML
COLLECTED ON 2: NORMAL
COLLECTED ON: NORMAL
CRL SCAN TWIN B: NORMAL
CRL SCAN: NORMAL
CROWN RUMP LENGTH TWIN B: NORMAL
CROWN RUMP LENGTH: 53.4 MM
DIA MOM: 0.79
DIA VALUE: 117.4 PG/ML
DOWN SYNDROME AGE RISK: NORMAL
DOWN SYNDROME INTERPRETATION: NORMAL
DOWN SYNDROME SCREENING RISK: NORMAL
FIRST TRIMESTER SAMPLE: NORMAL
GEST. AGE ON COLLECTION DATE: 11.9 WEEKS
GESTATIONAL AGE: 20.3 WEEKS
HCG MOM: 0.26
HCG VALUE: 4.5 IU/ML
INSULIN DEP DIABETES: NO
MATERNAL AGE AT EDD: 35 YR
NT MOM TWIN B: NORMAL
NT TWIN B: NORMAL
NUCHAL TRANSLUCENCY (NT): 1.4 MM
NUCHAL TRANSLUCENCY MOM: 1.09
NUMBER OF FETUSES: 1
OPEN SPINA BIFIDA: NORMAL
OSB INTERPRETATION: NORMAL
PAPP-A MOM: 0.28
PAPP-A VALUE: 133.3 NG/ML
RACE: NORMAL
SECOND TRIMESTER SAMPLE: NORMAL
SEQUENTIAL 2 COMMENTS: NORMAL
SEQUENTIAL 2 NOTE: NORMAL
SEQUENTIAL 2 RESULTS: NORMAL
SEQUENTIAL 2 TEST RESULTS: ABNORMAL
SONOGRAPHER ID#: NORMAL
TRISOMY 18 AGE RISK: NORMAL
TRISOMY 18 INTERPRETATION: NORMAL
TRISOMY 18 SCREENING RISK: NORMAL
UE3 MOM: 0.82
UE3 VALUE: 1.69 NG/ML
WEIGHT AFP: 215 LBS
WEIGHT: 220 LBS

## 2023-04-07 ENCOUNTER — NON-APPOINTMENT (OUTPATIENT)
Age: 33
End: 2023-04-07

## 2023-04-17 ENCOUNTER — APPOINTMENT (OUTPATIENT)
Dept: OBGYN | Facility: CLINIC | Age: 33
End: 2023-04-17
Payer: MEDICAID

## 2023-04-17 VITALS
WEIGHT: 222 LBS | DIASTOLIC BLOOD PRESSURE: 68 MMHG | BODY MASS INDEX: 40.85 KG/M2 | SYSTOLIC BLOOD PRESSURE: 108 MMHG | HEIGHT: 62 IN

## 2023-04-17 PROCEDURE — 0502F SUBSEQUENT PRENATAL CARE: CPT

## 2023-04-26 DIAGNOSIS — Z3A.23 23 WEEKS GESTATION OF PREGNANCY: ICD-10-CM

## 2023-05-01 ENCOUNTER — NON-APPOINTMENT (OUTPATIENT)
Age: 33
End: 2023-05-01

## 2023-05-02 LAB
BILIRUB UR QL STRIP: NORMAL
BILIRUB UR QL STRIP: NORMAL
GLUCOSE UR-MCNC: NORMAL
GLUCOSE UR-MCNC: NORMAL
HCG UR QL: 0.2 EU/DL
HCG UR QL: 0.2 EU/DL
HGB UR QL STRIP.AUTO: NORMAL
HGB UR QL STRIP.AUTO: NORMAL
KETONES UR-MCNC: NORMAL
KETONES UR-MCNC: NORMAL
LEUKOCYTE ESTERASE UR QL STRIP: NORMAL
LEUKOCYTE ESTERASE UR QL STRIP: NORMAL
NITRITE UR QL STRIP: NORMAL
NITRITE UR QL STRIP: NORMAL
PH UR STRIP: 6.5
PH UR STRIP: 7.5
PROT UR STRIP-MCNC: NORMAL
PROT UR STRIP-MCNC: NORMAL
SP GR UR STRIP: 1.01
SP GR UR STRIP: 1.02

## 2023-05-04 ENCOUNTER — APPOINTMENT (OUTPATIENT)
Dept: OBGYN | Facility: CLINIC | Age: 33
End: 2023-05-04
Payer: MEDICAID

## 2023-05-04 VITALS
HEIGHT: 62 IN | BODY MASS INDEX: 41.22 KG/M2 | DIASTOLIC BLOOD PRESSURE: 71 MMHG | SYSTOLIC BLOOD PRESSURE: 107 MMHG | WEIGHT: 224 LBS

## 2023-05-04 PROCEDURE — 0502F SUBSEQUENT PRENATAL CARE: CPT

## 2023-05-05 LAB
BASOPHILS # BLD AUTO: 0.02 K/UL
BASOPHILS NFR BLD AUTO: 0.2 %
BLD GP AB SCN SERPL QL: NORMAL
EOSINOPHIL # BLD AUTO: 0.21 K/UL
EOSINOPHIL NFR BLD AUTO: 2.4 %
GLUCOSE 1H P 50 G GLC PO SERPL-MCNC: 120 MG/DL
HCT VFR BLD CALC: 36.9 %
HGB BLD-MCNC: 12.3 G/DL
IMM GRANULOCYTES NFR BLD AUTO: 0.5 %
LYMPHOCYTES # BLD AUTO: 1.57 K/UL
LYMPHOCYTES NFR BLD AUTO: 17.8 %
MAN DIFF?: NORMAL
MCHC RBC-ENTMCNC: 30.4 PG
MCHC RBC-ENTMCNC: 33.3 GM/DL
MCV RBC AUTO: 91.1 FL
MONOCYTES # BLD AUTO: 0.47 K/UL
MONOCYTES NFR BLD AUTO: 5.3 %
NEUTROPHILS # BLD AUTO: 6.51 K/UL
NEUTROPHILS NFR BLD AUTO: 73.8 %
PLATELET # BLD AUTO: 201 K/UL
RBC # BLD: 4.05 M/UL
RBC # FLD: 13.6 %
WBC # FLD AUTO: 8.82 K/UL

## 2023-05-18 LAB
BILIRUB UR QL STRIP: NORMAL
CLARITY UR: CLEAR
COLLECTION METHOD: NORMAL
GLUCOSE UR-MCNC: NORMAL
HCG UR QL: 0.2 EU/DL
HGB UR QL STRIP.AUTO: NORMAL
KETONES UR-MCNC: NORMAL
LEUKOCYTE ESTERASE UR QL STRIP: NORMAL
NITRITE UR QL STRIP: NORMAL
PH UR STRIP: 7
PROT UR STRIP-MCNC: NORMAL
SP GR UR STRIP: 1.02

## 2023-05-22 ENCOUNTER — APPOINTMENT (OUTPATIENT)
Dept: OBGYN | Facility: CLINIC | Age: 33
End: 2023-05-22
Payer: MEDICAID

## 2023-05-22 VITALS
BODY MASS INDEX: 41.41 KG/M2 | WEIGHT: 225 LBS | SYSTOLIC BLOOD PRESSURE: 122 MMHG | DIASTOLIC BLOOD PRESSURE: 72 MMHG | HEIGHT: 62 IN

## 2023-05-22 LAB
BILIRUB UR QL STRIP: NORMAL
GLUCOSE UR-MCNC: NORMAL
HCG UR QL: 0.2 EU/DL
HGB UR QL STRIP.AUTO: NORMAL
KETONES UR-MCNC: NORMAL
LEUKOCYTE ESTERASE UR QL STRIP: NORMAL
NITRITE UR QL STRIP: NORMAL
PH UR STRIP: 6.5
PROT UR STRIP-MCNC: NORMAL
SP GR UR STRIP: 1.02

## 2023-05-22 PROCEDURE — 96372 THER/PROPH/DIAG INJ SC/IM: CPT

## 2023-05-22 PROCEDURE — 0502F SUBSEQUENT PRENATAL CARE: CPT

## 2023-05-22 PROCEDURE — 90384 RH IG FULL-DOSE IM: CPT

## 2023-06-06 ENCOUNTER — NON-APPOINTMENT (OUTPATIENT)
Age: 33
End: 2023-06-06

## 2023-06-13 ENCOUNTER — APPOINTMENT (OUTPATIENT)
Dept: OBGYN | Facility: CLINIC | Age: 33
End: 2023-06-13
Payer: MEDICAID

## 2023-06-13 VITALS
DIASTOLIC BLOOD PRESSURE: 76 MMHG | HEIGHT: 62 IN | WEIGHT: 226 LBS | SYSTOLIC BLOOD PRESSURE: 122 MMHG | BODY MASS INDEX: 41.59 KG/M2

## 2023-06-13 PROCEDURE — 0502F SUBSEQUENT PRENATAL CARE: CPT

## 2023-06-19 DIAGNOSIS — Z3A.31 31 WEEKS GESTATION OF PREGNANCY: ICD-10-CM

## 2023-06-19 DIAGNOSIS — Z3A.28 28 WEEKS GESTATION OF PREGNANCY: ICD-10-CM

## 2023-06-19 DIAGNOSIS — Z3A.25 25 WEEKS GESTATION OF PREGNANCY: ICD-10-CM

## 2023-06-20 ENCOUNTER — APPOINTMENT (OUTPATIENT)
Dept: ANTEPARTUM | Facility: CLINIC | Age: 33
End: 2023-06-20

## 2023-06-24 NOTE — OB RN PATIENT PROFILE - WEIGHT: TOTAL WEIGHT IN LBS
"  Sepsis Note   John Cohenland 78 y o  male MRN: [id number]  Unit/Bed#: ICU 04 Encounter: [id number]       Initial Sepsis Screening     [address] Name 06/24/23 0031                Is the patient's history suggestive of a new or worsening infection? Yes (Proceed)  -SA        Suspected source of infection acute abdominal infection  -SA        Indicate SIRS criteria Hyperthemia > 38 3C (100 9F) OR Hypothermia <36C (96 8F); Tachycardia > 90 bpm;Leukocytosis (WBC > 96867 IJL) OR Leukopenia (WBC <4000 IJL) OR Bandemia (WBC >10% bands)  -SA        Are two or more of the above signs & symptoms of infection both present and new to the patient? Yes (Proceed)  -SA        Assess for evidence of organ dysfunction: Are any of the below criteria present within 6 hours of suspected infection and SIRS criteria that are NOT considered to be chronic conditions? SBP < 90;MAP < 65;Creatinine > 2 0  -SA        Date of presentation of severe sepsis 06/24/23  -SA        Time of presentation of severe sepsis --        Date of presentation of septic shock --        Time of presentation of septic shock --        Fluid Resuscitation: A lesser volume than 30 ml/kg IV fluid will be given  -SA        The 30 mL/kg fluid bolus was not given to the patient despite having hypotension, a lactate of >= 4 mmol/L, or documentation of septic shock secondary to: Concern for fluid/volume overload;Heart Failure  -SA        Instead of the 30 ml/kg fluid bolus, the following volume of crystalloid fluid will be ordered: 2L  -SA        Of the following fluid type: NSS  -SA        Is the patient is persistently hypotensive in the hour after fluid bolus administration? If yes, patient meets criteria for vasopressor use  NO  -SA        Sepsis Note: Click \"NEXT\" below (NOT \"close\") to generate sepsis note based on above information   --              User Key  (r) = Recorded By, (t) = Taken By, (c) = Cosigned By    [address] Name Provider Type    SA Washington Raya, DO Physician " "               Default Flowsheet Data (last 720 hours)     Sepsis Reassess     Row Name 06/24/23 0249 06/24/23 0032                Repeat Volume Status and Tissue Perfusion Assessment Performed    Date of Reassessment: 06/24/23  -MS 06/24/23  -       Time of Reassessment: 0100  -MS --       Sepsis Reassessment Note: Click \"NEXT\" below (NOT \"close\") to generate sepsis reassessment note  YES (proceed by clicking \"NEXT\")  -MS YES (proceed by clicking \"NEXT\")  -SA       Repeat Volume Status and Tissue Perfusion Assessment Performed -- --             User Key  (r) = Recorded By, (t) = Taken By, (c) = Cosigned By    [address] Name Provider Type    SA Elvis Knutson DO Physician    Chandler Garcia [phone] Practitioner                Body mass index is 23 9 kg/m²    Wt Readings from Last 1 Encounters:   06/24/23 73 4 kg (161 lb 13 1 oz)     IBW (Ideal Body Weight): 70 7 kg    Ideal body weight: 70 7 kg (155 lb 13 8 oz)  Adjusted ideal body weight: 71 8 kg (158 lb 3 9 oz)  " 18

## 2023-06-26 ENCOUNTER — APPOINTMENT (OUTPATIENT)
Dept: OBGYN | Facility: CLINIC | Age: 33
End: 2023-06-26
Payer: MEDICAID

## 2023-06-26 VITALS
SYSTOLIC BLOOD PRESSURE: 109 MMHG | HEIGHT: 62 IN | DIASTOLIC BLOOD PRESSURE: 80 MMHG | BODY MASS INDEX: 41.41 KG/M2 | WEIGHT: 225 LBS

## 2023-06-26 PROCEDURE — 0502F SUBSEQUENT PRENATAL CARE: CPT

## 2023-06-27 ENCOUNTER — NON-APPOINTMENT (OUTPATIENT)
Age: 33
End: 2023-06-27

## 2023-07-11 ENCOUNTER — APPOINTMENT (OUTPATIENT)
Dept: OBGYN | Facility: CLINIC | Age: 33
End: 2023-07-11
Payer: MEDICAID

## 2023-07-11 VITALS
SYSTOLIC BLOOD PRESSURE: 128 MMHG | WEIGHT: 230 LBS | DIASTOLIC BLOOD PRESSURE: 88 MMHG | HEIGHT: 62 IN | BODY MASS INDEX: 42.33 KG/M2

## 2023-07-11 LAB
BILIRUB UR QL STRIP: NORMAL
CLARITY UR: CLEAR
COLLECTION METHOD: NORMAL
GLUCOSE UR-MCNC: NORMAL
HCG UR QL: 0.2 EU/DL
HGB UR QL STRIP.AUTO: NORMAL
KETONES UR-MCNC: NORMAL
LEUKOCYTE ESTERASE UR QL STRIP: NORMAL
NITRITE UR QL STRIP: NORMAL
PH UR STRIP: 6
PROT UR STRIP-MCNC: NORMAL
SP GR UR STRIP: 1.01

## 2023-07-11 PROCEDURE — 0502F SUBSEQUENT PRENATAL CARE: CPT

## 2023-07-17 ENCOUNTER — NON-APPOINTMENT (OUTPATIENT)
Age: 33
End: 2023-07-17

## 2023-07-17 ENCOUNTER — APPOINTMENT (OUTPATIENT)
Dept: OBGYN | Facility: CLINIC | Age: 33
End: 2023-07-17
Payer: MEDICAID

## 2023-07-17 VITALS
BODY MASS INDEX: 42.42 KG/M2 | DIASTOLIC BLOOD PRESSURE: 80 MMHG | HEIGHT: 62 IN | WEIGHT: 230.5 LBS | SYSTOLIC BLOOD PRESSURE: 118 MMHG

## 2023-07-17 PROCEDURE — 0502F SUBSEQUENT PRENATAL CARE: CPT

## 2023-07-20 LAB
BILIRUB UR QL STRIP: NORMAL
GLUCOSE UR-MCNC: NORMAL
GP B STREP DNA SPEC QL NAA+PROBE: NOT DETECTED
HCG UR QL: 0.2 EU/DL
HGB UR QL STRIP.AUTO: NORMAL
HIV1+2 AB SPEC QL IA.RAPID: NONREACTIVE
KETONES UR-MCNC: NORMAL
LEUKOCYTE ESTERASE UR QL STRIP: NORMAL
NITRITE UR QL STRIP: NORMAL
PH UR STRIP: 5.5
PROT UR STRIP-MCNC: NORMAL
SOURCE GBS: NORMAL
SP GR UR STRIP: 1
T PALLIDUM AB SER QL IA: NEGATIVE

## 2023-07-24 ENCOUNTER — APPOINTMENT (OUTPATIENT)
Dept: OBGYN | Facility: CLINIC | Age: 33
End: 2023-07-24
Payer: MEDICAID

## 2023-07-24 VITALS
HEIGHT: 62 IN | BODY MASS INDEX: 42.71 KG/M2 | WEIGHT: 232.1 LBS | SYSTOLIC BLOOD PRESSURE: 102 MMHG | DIASTOLIC BLOOD PRESSURE: 70 MMHG

## 2023-07-24 LAB
BILIRUB UR QL STRIP: NORMAL
CLARITY UR: CLEAR
COLLECTION METHOD: NORMAL
GLUCOSE UR-MCNC: NORMAL
HCG UR QL: 0.2 EU/DL
HGB UR QL STRIP.AUTO: NORMAL
KETONES UR-MCNC: NORMAL
LEUKOCYTE ESTERASE UR QL STRIP: NORMAL
NITRITE UR QL STRIP: NORMAL
PH UR STRIP: 6
PROT UR STRIP-MCNC: NORMAL
SP GR UR STRIP: 1.02

## 2023-07-24 PROCEDURE — 0502F SUBSEQUENT PRENATAL CARE: CPT

## 2023-07-26 ENCOUNTER — OUTPATIENT (OUTPATIENT)
Dept: OUTPATIENT SERVICES | Facility: HOSPITAL | Age: 33
LOS: 1 days | End: 2023-07-26
Payer: MEDICAID

## 2023-07-26 VITALS — HEART RATE: 102 BPM | DIASTOLIC BLOOD PRESSURE: 67 MMHG | SYSTOLIC BLOOD PRESSURE: 122 MMHG

## 2023-07-26 VITALS — HEART RATE: 110 BPM | DIASTOLIC BLOOD PRESSURE: 58 MMHG | SYSTOLIC BLOOD PRESSURE: 94 MMHG

## 2023-07-26 DIAGNOSIS — O26.899 OTHER SPECIFIED PREGNANCY RELATED CONDITIONS, UNSPECIFIED TRIMESTER: ICD-10-CM

## 2023-07-26 PROCEDURE — G0463: CPT

## 2023-07-26 PROCEDURE — 59025 FETAL NON-STRESS TEST: CPT

## 2023-07-26 RX ORDER — SODIUM CHLORIDE 9 MG/ML
1000 INJECTION, SOLUTION INTRAVENOUS ONCE
Refills: 0 | Status: COMPLETED | OUTPATIENT
Start: 2023-07-26 | End: 2023-07-26

## 2023-07-26 RX ADMIN — SODIUM CHLORIDE 1000 MILLILITER(S): 9 INJECTION, SOLUTION INTRAVENOUS at 18:40

## 2023-07-26 NOTE — OB PROVIDER TRIAGE NOTE - NSOBPROVIDERNOTE_OBGYN_ALL_OB_FT
A/P: HUONG VALENTIN is a 32y  at 37wk2d here for rule out labor.      Discussed with Dr. Nolan A/P: HUONG VALENTIN is a 32y  at 37wk2d here for rule out labor.    -Patient not in active labor  -D/c home with return precautions    Discussed with Dr. Nolan A/P: HUONG VALENTIN is a 32y  at 37wk2d evaluated with contractions, patient found to make no cervical change 3 hours apart, s/p LR bolus. Patient discharged home with labor precautions.     Discussed with Dr. Nolan

## 2023-07-26 NOTE — OB RN PATIENT PROFILE - FALL HARM RISK - UNIVERSAL INTERVENTIONS
Bed in lowest position, wheels locked, appropriate side rails in place/Call bell, personal items and telephone in reach/Instruct patient to call for assistance before getting out of bed or chair/Non-slip footwear when patient is out of bed/[place] to call system/Physically safe environment - no spills, clutter or unnecessary equipment/Purposeful Proactive Rounding/Room/bathroom lighting operational, light cord in reach

## 2023-07-26 NOTE — OB RN TRIAGE NOTE - NS_TRIAGETIMEOF NOTIFICATION_OBGYN_ALL_OB_DT
Patient: Laura Noriega Date: 2022  : 1990   32 year old female       Chief Complaint   Patient presents with   • Video Visit   • Covid Infection     yesterday patient tested positive covid  at home 3 tests taken, patient symptoms are headache, body aches 102.5 fever-now its 99.2, bad cough, no smell  Symptoms started Saturday, worse         HPI    This is a video visit.  Patient developed fever up to 102.5 degrees maximum which started 2 days ago.  She has been taking Tylenol and Advil for the fever.  She has a cough which is not productive.  No wheezing or shortness of breath.  She has some nasal congestion.  She has loss of taste and smell.  She has fatigue and body aches.  She has headaches.  He did a home COVID test yesterday which was positive.  She has been vaccinated twice for COVID.        Review of Systems   Constitutional: Positive for fatigue and fever.   HENT: Positive for congestion and postnasal drip. Negative for ear pain.    Respiratory: Positive for cough. Negative for shortness of breath and wheezing.    Musculoskeletal: Positive for myalgias.   Neurological: Positive for headaches.       ROS:      Current Outpatient Medications   Medication Sig Dispense Refill   • benzonatate (TESSALON PERLES) 100 MG capsule Take 2 capsules by mouth every 8 hours as needed for Cough. 15 capsule 0   • nirmatrelvir & ritonavir (Paxlovid) 20 x 150 MG & 10 x 100MG Tablet Therapy Pack Take 300 mg nirmatrelvir (two 150 mg tablets) with 100 mg ritonavir (one 100 mg tablet), with all three tablets taken together by mouth twice daily for 5 days. 30 each 0   • amphetamine-dextroamphetamine (ADDERALL) 30 MG tablet Take 0.5 tablets by mouth 2 times daily. 30 tablet 0   • propRANolol (INDERAL) 10 MG tablet Take 1 tablet by mouth every 8 hours as needed (anxiety). 60 tablet 2   • LORazepam (ATIVAN) 1 MG tablet Take 1 tablet by mouth daily as needed for Anxiety. 30 tablet 0   • amphetamine-dextroamphetamine  (ADDERALL) 30 MG tablet Take 0.5 tablets by mouth 2 times daily. Do not start before February 23, 2022. 30 tablet 0   • amphetamine-dextroamphetamine (ADDERALL) 30 MG tablet Take 0.5 tablets by mouth 2 times daily. Do not start before March 25, 2022. 30 tablet 0   • LORazepam (ATIVAN) 1 MG tablet Take 1 tablet by mouth nightly. Do not start before February 23, 2022. 30 tablet 1   • spironolactone (ALDACTONE) 50 MG tablet Take 50 mg by mouth daily.     • Multiple Vitamins-Minerals (Daily Combo Multi Vitamins) Tab Take 1 tablet by mouth.     • levothyroxine 50 MCG tablet Take 1 tablet by mouth daily. 60 tablet 0   • metFORMIN (GLUCOPHAGE) 500 MG tablet Take 500 mg by mouth 2 times daily. 2 tablets in AM and 2 tablets PM  9     No current facility-administered medications for this visit.       ALLERGIES:   Allergen Reactions   • Pertussis Vaccine ANAPHYLAXIS     ALLERGY HAPPENED WHEN SMALL CHILD   • Seasonal Other (See Comments)       Past Medical History:   Diagnosis Date   • ADHD    • Anxiety    • Clavicle fracture    • Thyroid disease        Past Surgical History:   Procedure Laterality Date   • Colposcopy,loop electrd cervix excis  06/25/2013    Benign at [facility]  Dr. Anuj Helm   • Fracture surgery      Right clavicle 7 screws and plate   • Laser ablation of the cervix     • Open repair clavicle fracture Right        Social History     Socioeconomic History   • Marital status: Single     Spouse name: Not on file   • Number of children: Not on file   • Years of education: Not on file   • Highest education level: Not on file   Occupational History   • Not on file   Tobacco Use   • Smoking status: Former Smoker     Packs/day: 1.00     Types: Cigarettes   • Smokeless tobacco: Never Used   • Tobacco comment: i cigarrete a day every since meds started    Vaping Use   • Vaping Use: never used   Substance and Sexual Activity   • Alcohol use: Not Currently     Alcohol/week: 2.0 standard drinks     Types: 2  Standard drinks or equivalent per week   • Drug use: Yes     Types: Marijuana     Comment: once in a while   • Sexual activity: Never   Other Topics Concern   • Not on file   Social History Narrative    Lives in [facility] [place], [place].        Father lives with Laura.        3 zoran, 1 black lab.        Works for [employer].  Been there for 15 years.        No kids.     Social Determinants of Health     Financial Resource Strain: Not on file   Food Insecurity: Not on file   Transportation Needs: Not on file   Physical Activity: Not on file   Stress: Not on file   Social Connections: Not on file   Intimate Partner Violence: Not on file       Family History   Problem Relation Age of Onset   • Diabetes Father    • Stroke Father 42   • Diabetes Paternal Uncle         Type II   • Diabetes Mother        Visit Vitals  [facility] 06/25/2022 (Exact Date)       Physical Exam  Exam not done as this is a video visit            Assessment and plan:    Laura was seen today for video visit and covid infection.    Diagnoses and all orders for this visit:    COVID-19 virus infection    Other orders  -     nirmatrelvir & ritonavir (Paxlovid) 20 x 150 MG & 10 x 100MG Tablet Therapy Pack; Take 300 mg nirmatrelvir (two 150 mg tablets) with 100 mg ritonavir (one 100 mg tablet), with all three tablets taken together by mouth twice daily for 5 days.    I discussed my assessment plan with the patient detail.  All questions were answered    Patient will be in a higher risk group for COVID since she has a BMI greater than 30.    She will be started on Paxlovid.  I discussed the common side effects of Paxlovid with the patient.  She can take Mucinex DM as needed for cough  Continue Tylenol and Advil for fever and headaches.  If she develops any shortness of breath she needs to call me.      Toño Ramesh MD          Electronically signed by Toño Ramesh MD    26-Jul-2023 17:30

## 2023-07-26 NOTE — OB RN TRIAGE NOTE - FALL HARM RISK - UNIVERSAL INTERVENTIONS
[Consultation] : a consultation visit [FreeTextEntry1] : screening colonoscopy Bed in lowest position, wheels locked, appropriate side rails in place/Call bell, personal items and telephone in reach/Instruct patient to call for assistance before getting out of bed or chair/Non-slip footwear when patient is out of bed/[place] to call system/Physically safe environment - no spills, clutter or unnecessary equipment/Purposeful Proactive Rounding/Room/bathroom lighting operational, light cord in reach

## 2023-07-26 NOTE — OB PROVIDER TRIAGE NOTE - HISTORY OF PRESENT ILLNESS
SUBJECTIVE:  HUONG VALENTIN is a 32y  at 37wk2d GA by LMP consistent with first trimester sono who presents to L&D with contractions and vaginal bleeding. Patient denies vaginal bleeding, contractions and leakage of fluid. She endorses fetal movement. No other complaints at this time.     Patient receives care with Dr. Riddle    Prenatal course is significant for:    GBS Status: Negative    OBHx:   GYNHx: Denies history of fibroids, ovarian cysts, STIs, abnormal pap smears   PMHx: Denies  PSHx: Denies  SocHx: Denies EtOH, tobacco and illicit drug use during this pregnancy; feels safe at home   Meds: PNVs  Allergies: NKDA     SUBJECTIVE:  HUONG VALENTIN is a 32y  at 37wk2d GA by LMP consistent with first trimester sono who presents to L&D with contractions and vaginal bleeding. The patient endorses that on  at 7pm she started to feel contractions. The contractions happen every 30 minutes and she describes the pain as a 4/10. On  at 12pm the patient described having some vaginal bleeding and came to the hospital to get evaluated. The patient's concern is to make sure baby is ok.  Patient denies leakage of fluid, blurry vision, black spots in vision, nausea, vomiting, and RUQ pain. She endorses fetal movement. No other complaints at this time.     Patient receives care with Dr. Riddle    Prenatal course is significant for:    GBS Status: Negative    OBHx:   GYNHx: Denies history of ovarian cysts, STIs, abnormal pap smears. Patient is not sure if she has fibroids.   PMHx: Denies  PSHx: Denies  SocHx: Denies EtOH, tobacco and illicit drug use during this pregnancy; feels safe at home, no guns at home. Lives in a house with  in [place] with her 3 children.    Meds: PNVs  Allergies: NKDA, Seasonal Allergies     SUBJECTIVE:  HUONG VALENTIN is a 32y  at 37wk2d GA by LMP consistent with first trimester sono who presents to L&D with contractions and vaginal bleeding. The patient endorses that on  at 7pm she started to feel contractions. The contractions happen every 30 minutes and she describes the pain as a 4/10. On  at 12pm the patient described having some vaginal bleeding and came to the hospital to get evaluated. The patient's concern is to make sure baby is ok.  Patient denies leakage of fluid, blurry vision, black spots in vision, nausea, vomiting, and RUQ pain. She endorses fetal movement. No other complaints at this time.     Patient receives care with Dr. Riddle    GBS Status: Negative  OBHx:   GYNHx: Denies history of ovarian cysts, STIs, abnormal pap smears. Patient is not sure if she has fibroids.   PMHx: Denies  PSHx: Denies  SocHx: Denies EtOH, tobacco and illicit drug use during this pregnancy; feels safe at home, no guns at home. Lives in a house with  in [place] with her 3 children.    Meds: PNVs  Allergies: NKDA, Seasonal Allergies

## 2023-07-26 NOTE — OB PROVIDER TRIAGE NOTE - NSHPPHYSICALEXAM_GEN_ALL_CORE
OBJECTIVE:  T(C): 36.7 (07-26-23 @ 17:12), Max: 36.7 (07-26-23 @ 17:12)  HR: 110 (07-26-23 @ 17:12) (110 - 110)  BP: 94/58 (07-26-23 @ 17:12) (94/58 - 94/58)  RR: 16 (07-26-23 @ 17:12) (16 - 16)  Physical Exam:  Gen: NAD, well-appearing, AAOx3   Abd: Soft, gravid  Ext: non-tender, non-edematous  SVE: 1/20/-3, no blood noted     FHT: Cat I  [place]: Isolated contractions OBJECTIVE:  T(C): 36.7 (07-26-23 @ 17:12), Max: 36.7 (07-26-23 @ 17:12)  HR: 110 (07-26-23 @ 17:12) (110 - 110)  BP: 94/58 (07-26-23 @ 17:12) (94/58 - 94/58)  RR: 16 (07-26-23 @ 17:12) (16 - 16)  Physical Exam:  Gen: NAD, well-appearing, AAOx3   Abd: Soft, gravid  Ext: non-tender, non-edematous  SVE: 1/20/-3, no blood noted. Recheck 3 hours later with no change.    FHT: Cat I  [place]: Isolated contractions

## 2023-07-27 DIAGNOSIS — O46.93 ANTEPARTUM HEMORRHAGE, UNSPECIFIED, THIRD TRIMESTER: ICD-10-CM

## 2023-07-27 DIAGNOSIS — Z3A.37 37 WEEKS GESTATION OF PREGNANCY: ICD-10-CM

## 2023-07-27 DIAGNOSIS — O47.1 FALSE LABOR AT OR AFTER 37 COMPLETED WEEKS OF GESTATION: ICD-10-CM

## 2023-07-31 ENCOUNTER — NON-APPOINTMENT (OUTPATIENT)
Age: 33
End: 2023-07-31

## 2023-07-31 ENCOUNTER — APPOINTMENT (OUTPATIENT)
Dept: OBGYN | Facility: CLINIC | Age: 33
End: 2023-07-31
Payer: MEDICAID

## 2023-07-31 VITALS
HEIGHT: 62 IN | WEIGHT: 232 LBS | DIASTOLIC BLOOD PRESSURE: 86 MMHG | BODY MASS INDEX: 42.69 KG/M2 | SYSTOLIC BLOOD PRESSURE: 120 MMHG

## 2023-07-31 LAB
BILIRUB UR QL STRIP: NORMAL
GLUCOSE UR-MCNC: NORMAL
HCG UR QL: 0.2 EU/DL
HGB UR QL STRIP.AUTO: NORMAL
KETONES UR-MCNC: NORMAL
LEUKOCYTE ESTERASE UR QL STRIP: NORMAL
NITRITE UR QL STRIP: NORMAL
PH UR STRIP: 6.5
PROT UR STRIP-MCNC: NORMAL
SP GR UR STRIP: 1.01

## 2023-07-31 PROCEDURE — 0502F SUBSEQUENT PRENATAL CARE: CPT

## 2023-08-03 ENCOUNTER — OUTPATIENT (OUTPATIENT)
Dept: INPATIENT UNIT | Facility: HOSPITAL | Age: 33
LOS: 1 days | End: 2023-08-03
Payer: MEDICAID

## 2023-08-03 VITALS
RESPIRATION RATE: 18 BRPM | TEMPERATURE: 98 F | DIASTOLIC BLOOD PRESSURE: 83 MMHG | HEART RATE: 93 BPM | SYSTOLIC BLOOD PRESSURE: 123 MMHG

## 2023-08-03 VITALS — DIASTOLIC BLOOD PRESSURE: 83 MMHG | SYSTOLIC BLOOD PRESSURE: 127 MMHG | HEART RATE: 85 BPM

## 2023-08-03 DIAGNOSIS — Z3A.37 37 WEEKS GESTATION OF PREGNANCY: ICD-10-CM

## 2023-08-03 DIAGNOSIS — Z3A.34 34 WEEKS GESTATION OF PREGNANCY: ICD-10-CM

## 2023-08-03 DIAGNOSIS — Z3A.36 36 WEEKS GESTATION OF PREGNANCY: ICD-10-CM

## 2023-08-03 DIAGNOSIS — O26.899 OTHER SPECIFIED PREGNANCY RELATED CONDITIONS, UNSPECIFIED TRIMESTER: ICD-10-CM

## 2023-08-03 DIAGNOSIS — Z3A.35 35 WEEKS GESTATION OF PREGNANCY: ICD-10-CM

## 2023-08-03 LAB
ALBUMIN SERPL ELPH-MCNC: 3.4 G/DL — SIGNIFICANT CHANGE UP (ref 3.3–5.2)
ALP SERPL-CCNC: 188 U/L — HIGH (ref 40–120)
ALT FLD-CCNC: 16 U/L — SIGNIFICANT CHANGE UP
ANION GAP SERPL CALC-SCNC: 14 MMOL/L — SIGNIFICANT CHANGE UP (ref 5–17)
APPEARANCE UR: CLEAR — SIGNIFICANT CHANGE UP
APTT BLD: 26.5 SEC — SIGNIFICANT CHANGE UP (ref 24.5–35.6)
AST SERPL-CCNC: 23 U/L — SIGNIFICANT CHANGE UP
BACTERIA # UR AUTO: ABNORMAL
BASOPHILS # BLD AUTO: 0.01 K/UL — SIGNIFICANT CHANGE UP (ref 0–0.2)
BASOPHILS NFR BLD AUTO: 0.1 % — SIGNIFICANT CHANGE UP (ref 0–2)
BILIRUB SERPL-MCNC: 0.2 MG/DL — LOW (ref 0.4–2)
BILIRUB UR-MCNC: NEGATIVE — SIGNIFICANT CHANGE UP
BLD GP AB SCN SERPL QL: SIGNIFICANT CHANGE UP
BUN SERPL-MCNC: 10.5 MG/DL — SIGNIFICANT CHANGE UP (ref 8–20)
CALCIUM SERPL-MCNC: 9 MG/DL — SIGNIFICANT CHANGE UP (ref 8.4–10.5)
CHLORIDE SERPL-SCNC: 104 MMOL/L — SIGNIFICANT CHANGE UP (ref 96–108)
CO2 SERPL-SCNC: 20 MMOL/L — LOW (ref 22–29)
COLOR SPEC: YELLOW — SIGNIFICANT CHANGE UP
CREAT ?TM UR-MCNC: 128 MG/DL — SIGNIFICANT CHANGE UP
CREAT SERPL-MCNC: 0.73 MG/DL — SIGNIFICANT CHANGE UP (ref 0.5–1.3)
DIFF PNL FLD: NEGATIVE — SIGNIFICANT CHANGE UP
EGFR: 111 ML/MIN/1.73M2 — SIGNIFICANT CHANGE UP
EOSINOPHIL # BLD AUTO: 0.03 K/UL — SIGNIFICANT CHANGE UP (ref 0–0.5)
EOSINOPHIL NFR BLD AUTO: 0.4 % — SIGNIFICANT CHANGE UP (ref 0–6)
EPI CELLS # UR: SIGNIFICANT CHANGE UP
FIBRINOGEN PPP-MCNC: 529 MG/DL — HIGH (ref 200–450)
GLUCOSE SERPL-MCNC: 84 MG/DL — SIGNIFICANT CHANGE UP (ref 70–99)
GLUCOSE UR QL: NEGATIVE MG/DL — SIGNIFICANT CHANGE UP
HCT VFR BLD CALC: 39.4 % — SIGNIFICANT CHANGE UP (ref 34.5–45)
HGB BLD-MCNC: 12.9 G/DL — SIGNIFICANT CHANGE UP (ref 11.5–15.5)
IMM GRANULOCYTES NFR BLD AUTO: 0.6 % — SIGNIFICANT CHANGE UP (ref 0–0.9)
INR BLD: 0.94 RATIO — SIGNIFICANT CHANGE UP (ref 0.85–1.18)
KETONES UR-MCNC: ABNORMAL
LDH SERPL L TO P-CCNC: 227 U/L — HIGH (ref 98–192)
LEUKOCYTE ESTERASE UR-ACNC: ABNORMAL
LYMPHOCYTES # BLD AUTO: 1.98 K/UL — SIGNIFICANT CHANGE UP (ref 1–3.3)
LYMPHOCYTES # BLD AUTO: 24 % — SIGNIFICANT CHANGE UP (ref 13–44)
MCHC RBC-ENTMCNC: 28.2 PG — SIGNIFICANT CHANGE UP (ref 27–34)
MCHC RBC-ENTMCNC: 32.7 GM/DL — SIGNIFICANT CHANGE UP (ref 32–36)
MCV RBC AUTO: 86.2 FL — SIGNIFICANT CHANGE UP (ref 80–100)
MONOCYTES # BLD AUTO: 0.48 K/UL — SIGNIFICANT CHANGE UP (ref 0–0.9)
MONOCYTES NFR BLD AUTO: 5.8 % — SIGNIFICANT CHANGE UP (ref 2–14)
NEUTROPHILS # BLD AUTO: 5.69 K/UL — SIGNIFICANT CHANGE UP (ref 1.8–7.4)
NEUTROPHILS NFR BLD AUTO: 69.1 % — SIGNIFICANT CHANGE UP (ref 43–77)
NITRITE UR-MCNC: NEGATIVE — SIGNIFICANT CHANGE UP
PH UR: 6 — SIGNIFICANT CHANGE UP (ref 5–8)
PLATELET # BLD AUTO: 177 K/UL — SIGNIFICANT CHANGE UP (ref 150–400)
POTASSIUM SERPL-MCNC: 4.2 MMOL/L — SIGNIFICANT CHANGE UP (ref 3.5–5.3)
POTASSIUM SERPL-SCNC: 4.2 MMOL/L — SIGNIFICANT CHANGE UP (ref 3.5–5.3)
PROT ?TM UR-MCNC: 11 MG/DL — SIGNIFICANT CHANGE UP (ref 0–12)
PROT SERPL-MCNC: 6.6 G/DL — SIGNIFICANT CHANGE UP (ref 6.6–8.7)
PROT UR-MCNC: NEGATIVE — SIGNIFICANT CHANGE UP
PROT/CREAT UR-RTO: 0.1 RATIO — SIGNIFICANT CHANGE UP
PROTHROM AB SERPL-ACNC: 10.4 SEC — SIGNIFICANT CHANGE UP (ref 9.5–13)
RBC # BLD: 4.57 M/UL — SIGNIFICANT CHANGE UP (ref 3.8–5.2)
RBC # FLD: 14.2 % — SIGNIFICANT CHANGE UP (ref 10.3–14.5)
RBC CASTS # UR COMP ASSIST: SIGNIFICANT CHANGE UP /HPF (ref 0–4)
SODIUM SERPL-SCNC: 138 MMOL/L — SIGNIFICANT CHANGE UP (ref 135–145)
SP GR SPEC: 1.01 — SIGNIFICANT CHANGE UP (ref 1.01–1.02)
URATE SERPL-MCNC: 5.4 MG/DL — SIGNIFICANT CHANGE UP (ref 2.4–5.7)
UROBILINOGEN FLD QL: NEGATIVE MG/DL — SIGNIFICANT CHANGE UP
WBC # BLD: 8.24 K/UL — SIGNIFICANT CHANGE UP (ref 3.8–10.5)
WBC # FLD AUTO: 8.24 K/UL — SIGNIFICANT CHANGE UP (ref 3.8–10.5)
WBC UR QL: SIGNIFICANT CHANGE UP /HPF (ref 0–5)

## 2023-08-03 PROCEDURE — 84156 ASSAY OF PROTEIN URINE: CPT

## 2023-08-03 PROCEDURE — 36415 COLL VENOUS BLD VENIPUNCTURE: CPT

## 2023-08-03 PROCEDURE — 80053 COMPREHEN METABOLIC PANEL: CPT

## 2023-08-03 PROCEDURE — 81001 URINALYSIS AUTO W/SCOPE: CPT

## 2023-08-03 PROCEDURE — G0463: CPT

## 2023-08-03 PROCEDURE — 99221 1ST HOSP IP/OBS SF/LOW 40: CPT | Mod: 25,GC

## 2023-08-03 PROCEDURE — 86850 RBC ANTIBODY SCREEN: CPT

## 2023-08-03 PROCEDURE — 59025 FETAL NON-STRESS TEST: CPT | Mod: 26

## 2023-08-03 PROCEDURE — 59025 FETAL NON-STRESS TEST: CPT

## 2023-08-03 PROCEDURE — 85610 PROTHROMBIN TIME: CPT

## 2023-08-03 PROCEDURE — 84550 ASSAY OF BLOOD/URIC ACID: CPT

## 2023-08-03 PROCEDURE — 85384 FIBRINOGEN ACTIVITY: CPT

## 2023-08-03 PROCEDURE — 86900 BLOOD TYPING SEROLOGIC ABO: CPT

## 2023-08-03 PROCEDURE — 85025 COMPLETE CBC W/AUTO DIFF WBC: CPT

## 2023-08-03 PROCEDURE — 86901 BLOOD TYPING SEROLOGIC RH(D): CPT

## 2023-08-03 PROCEDURE — 82570 ASSAY OF URINE CREATININE: CPT

## 2023-08-03 PROCEDURE — 76819 FETAL BIOPHYS PROFIL W/O NST: CPT | Mod: 26

## 2023-08-03 PROCEDURE — 83615 LACTATE (LD) (LDH) ENZYME: CPT

## 2023-08-03 PROCEDURE — 76815 OB US LIMITED FETUS(S): CPT | Mod: 26

## 2023-08-03 PROCEDURE — 85730 THROMBOPLASTIN TIME PARTIAL: CPT

## 2023-08-03 NOTE — OB RN TRIAGE NOTE - FALL HARM RISK - UNIVERSAL INTERVENTIONS
Bed in lowest position, wheels locked, appropriate side rails in place/Call bell, personal items and telephone in reach/Instruct patient to call for assistance before getting out of bed or chair/Non-slip footwear when patient is out of bed/[place] to call system/Physically safe environment - no spills, clutter or unnecessary equipment/Purposeful Proactive Rounding/Room/bathroom lighting operational, light cord in reach

## 2023-08-03 NOTE — OB PROVIDER TRIAGE NOTE - NSHPLABSRESULTS_GEN_ALL_CORE
12.9   8.24  )-----------( 177      ( 03 Aug 2023 16:45 )             39.4       08-03    138  |  104  |  10.5  ----------------------------<  84  4.2   |  20.0<L>  |  0.73    Ca    9.0      03 Aug 2023 16:45    TPro  6.6  /  Alb  3.4  /  TBili  0.2<L>  /  DBili  x   /  AST  23  /  ALT  16  /  AlkPhos  188<H>  08-03      Protein/Creatinine Ratio Calculation: 0.1 Ratio (08.03.23 @ 16:45)

## 2023-08-03 NOTE — OB PROVIDER TRIAGE NOTE - NSOBPROVIDERNOTE_OBGYN_ALL_OB_FT
A/P: 32y  at 38w3d GA is being evaluated for   - Reporting contractions  - VSS, BP wnl   - NST reassuring  - BPP    - PIH labs sent       Discussed with Dr. Woodruff A/P: 32y  at 38w3d GA is being evaluated for pelvic pain.   - Asymptomatic   - VSS, BP wnl   - NST reassuring  - BPP    - CBC, CMP WNL  - P/C 0.1    Dispo: Patient is stable for discharge to home with outpatient follow up on Monday. Strict return precautions given. Patient verbalized understanding and agreement with plan.     Discussed with Dr. Woodruff A/P: 32y  at 38w3d GA is being evaluated for decreased fetal movement now resolved and pelvic cramping.   - VSS, BP wnl   - NST reassuring  - BPP    - CBC, CMP WNL  - P/C 0.1    Dispo: Patient is stable for discharge to home with outpatient follow up on Monday. Strict return precautions given. Patient verbalized understanding and agreement with plan.     Discussed with Dr. Woodruff

## 2023-08-03 NOTE — OB PROVIDER TRIAGE NOTE - ATTENDING COMMENTS
32y  at 38w3d GA is being evaluated for decreased fetal movement now resolved with no s/s active labor and reassuring fetal testing, return precautions discussed.  Impression NST: reactive

## 2023-08-03 NOTE — OB RN TRIAGE NOTE - CHIEF COMPLAINT QUOTE
"I cant feel my baby since last night" "I cant feel my baby since last night and I had bloody mucus and cramping"

## 2023-08-03 NOTE — OB PROVIDER TRIAGE NOTE - NSHPPHYSICALEXAM_GEN_ALL_CORE
T(C): 36.6 (08-03-23 @ 15:54), Max: 36.6 (08-03-23 @ 15:51)  HR: 93 (08-03-23 @ 16:00) (93 - 93)  BP: 123/83 (08-03-23 @ 16:00) (123/83 - 123/83)  RR: 18 (08-03-23 @ 15:54) (18 - 18)    Gen: NAD, well-appearing   Abd: Soft, gravid. diffuse tenderness, no rebound or guarding. No murphys sign.   Ext: non-tender, non-edematous    Bedside sono: BPP 8/8, VINNY 20.69, bilateral hydocele noted.   FHT: baseline 145, moderate variability, + accels, no decels   [place]: tucker q4-5 minutes T(C): 36.6 (08-03-23 @ 15:54), Max: 36.6 (08-03-23 @ 15:51)  HR: 93 (08-03-23 @ 16:00) (93 - 93)  BP: 123/83 (08-03-23 @ 16:00) (123/83 - 123/83)  RR: 18 (08-03-23 @ 15:54) (18 - 18)    Gen: NAD, well-appearing   Abd: Soft, gravid. diffuse tenderness, no rebound or guarding. No murphys sign.   Ext: non-tender, non-edematous  Bedside sono: BPP 8/8, VINNY 20.69, bilateral hydocele noted.   FHT: baseline 145, moderate variability, + accels, no decels   [place]: tucker q4-5 minutes  SVE: 1.5/50/-3

## 2023-08-03 NOTE — OB PROVIDER TRIAGE NOTE - HISTORY OF PRESENT ILLNESS
32y  at 38w3d GA who presents to L&D for abdominal cramping and decreased fetal movement. Pt reports she last felt FM at 0500 this morning and has not felt baby since then. Her abdominal cramping is in her lower belly and along the R side of her abdomen, contraction-like pain, occurring every 5-10 minutes for the past 2 hours. Pt reports passing a small amount of mucousy discharge mixed with blood. She had a frontal headache since last night which she has not taken any medications for. Denies LOF. Denies vision changes, RUQ pain, new-onset edema. Denies fevers, chills, nausea and vomiting. No other complaints at this time.     Prenatal course is significant for: prior h/o PEC (first pregnancy) and gHTN (second pregnancy)    POB: , FT  x3  PGYN: -fibroids, -ovarian cysts, denies STD hx, denies abnormal PAPs   PMH: asthma  PSH: Denies  SH: Denies EtOH, tobacco and illicit drug use during this pregnancy; feels safe at home   Meds: PNVs, ASA, albuterol prn   Allergies: NKDA

## 2023-08-06 ENCOUNTER — TRANSCRIPTION ENCOUNTER (OUTPATIENT)
Age: 33
End: 2023-08-06

## 2023-08-06 ENCOUNTER — NON-APPOINTMENT (OUTPATIENT)
Age: 33
End: 2023-08-06

## 2023-08-06 ENCOUNTER — RESULT REVIEW (OUTPATIENT)
Age: 33
End: 2023-08-06

## 2023-08-06 ENCOUNTER — INPATIENT (INPATIENT)
Facility: HOSPITAL | Age: 33
LOS: 1 days | Discharge: HOME | End: 2023-08-08
Attending: OBSTETRICS & GYNECOLOGY | Admitting: OBSTETRICS & GYNECOLOGY
Payer: MEDICAID

## 2023-08-06 VITALS
RESPIRATION RATE: 16 BRPM | HEART RATE: 104 BPM | TEMPERATURE: 98 F | DIASTOLIC BLOOD PRESSURE: 86 MMHG | SYSTOLIC BLOOD PRESSURE: 124 MMHG

## 2023-08-06 DIAGNOSIS — O26.899 OTHER SPECIFIED PREGNANCY RELATED CONDITIONS, UNSPECIFIED TRIMESTER: ICD-10-CM

## 2023-08-06 DIAGNOSIS — O26.893 OTHER SPECIFIED PREGNANCY RELATED CONDITIONS, THIRD TRIMESTER: ICD-10-CM

## 2023-08-06 PROBLEM — J45.909 UNSPECIFIED ASTHMA, UNCOMPLICATED: Chronic | Status: ACTIVE | Noted: 2023-08-03

## 2023-08-06 LAB
ALBUMIN SERPL ELPH-MCNC: 3.5 G/DL — SIGNIFICANT CHANGE UP (ref 3.3–5.2)
ALP SERPL-CCNC: 195 U/L — HIGH (ref 40–120)
ALT FLD-CCNC: 14 U/L — SIGNIFICANT CHANGE UP
ANION GAP SERPL CALC-SCNC: 15 MMOL/L — SIGNIFICANT CHANGE UP (ref 5–17)
APPEARANCE UR: CLEAR — SIGNIFICANT CHANGE UP
APTT BLD: 25.5 SEC — SIGNIFICANT CHANGE UP (ref 24.5–35.6)
AST SERPL-CCNC: 23 U/L — SIGNIFICANT CHANGE UP
BASOPHILS # BLD AUTO: 0.01 K/UL — SIGNIFICANT CHANGE UP (ref 0–0.2)
BASOPHILS NFR BLD AUTO: 0.1 % — SIGNIFICANT CHANGE UP (ref 0–2)
BILIRUB SERPL-MCNC: 0.2 MG/DL — LOW (ref 0.4–2)
BILIRUB UR-MCNC: NEGATIVE — SIGNIFICANT CHANGE UP
BLD GP AB SCN SERPL QL: SIGNIFICANT CHANGE UP
BUN SERPL-MCNC: 11.6 MG/DL — SIGNIFICANT CHANGE UP (ref 8–20)
CALCIUM SERPL-MCNC: 9 MG/DL — SIGNIFICANT CHANGE UP (ref 8.4–10.5)
CHLORIDE SERPL-SCNC: 103 MMOL/L — SIGNIFICANT CHANGE UP (ref 96–108)
CO2 SERPL-SCNC: 18 MMOL/L — LOW (ref 22–29)
COLOR SPEC: YELLOW — SIGNIFICANT CHANGE UP
CREAT ?TM UR-MCNC: 23 MG/DL — SIGNIFICANT CHANGE UP
CREAT SERPL-MCNC: 0.63 MG/DL — SIGNIFICANT CHANGE UP (ref 0.5–1.3)
DIFF PNL FLD: NEGATIVE — SIGNIFICANT CHANGE UP
EGFR: 119 ML/MIN/1.73M2 — SIGNIFICANT CHANGE UP
EOSINOPHIL # BLD AUTO: 0.04 K/UL — SIGNIFICANT CHANGE UP (ref 0–0.5)
EOSINOPHIL NFR BLD AUTO: 0.4 % — SIGNIFICANT CHANGE UP (ref 0–6)
FIBRINOGEN PPP-MCNC: 580 MG/DL — HIGH (ref 200–450)
GLUCOSE SERPL-MCNC: 92 MG/DL — SIGNIFICANT CHANGE UP (ref 70–99)
GLUCOSE UR QL: NEGATIVE MG/DL — SIGNIFICANT CHANGE UP
HCT VFR BLD CALC: 37.6 % — SIGNIFICANT CHANGE UP (ref 34.5–45)
HGB BLD-MCNC: 12.9 G/DL — SIGNIFICANT CHANGE UP (ref 11.5–15.5)
IMM GRANULOCYTES NFR BLD AUTO: 0.7 % — SIGNIFICANT CHANGE UP (ref 0–0.9)
INR BLD: 0.92 RATIO — SIGNIFICANT CHANGE UP (ref 0.85–1.18)
KETONES UR-MCNC: NEGATIVE — SIGNIFICANT CHANGE UP
LDH SERPL L TO P-CCNC: 269 U/L — HIGH (ref 98–192)
LEUKOCYTE ESTERASE UR-ACNC: NEGATIVE — SIGNIFICANT CHANGE UP
LYMPHOCYTES # BLD AUTO: 1.95 K/UL — SIGNIFICANT CHANGE UP (ref 1–3.3)
LYMPHOCYTES # BLD AUTO: 19.1 % — SIGNIFICANT CHANGE UP (ref 13–44)
MCHC RBC-ENTMCNC: 29.5 PG — SIGNIFICANT CHANGE UP (ref 27–34)
MCHC RBC-ENTMCNC: 34.3 GM/DL — SIGNIFICANT CHANGE UP (ref 32–36)
MCV RBC AUTO: 86 FL — SIGNIFICANT CHANGE UP (ref 80–100)
MONOCYTES # BLD AUTO: 0.54 K/UL — SIGNIFICANT CHANGE UP (ref 0–0.9)
MONOCYTES NFR BLD AUTO: 5.3 % — SIGNIFICANT CHANGE UP (ref 2–14)
NEUTROPHILS # BLD AUTO: 7.58 K/UL — HIGH (ref 1.8–7.4)
NEUTROPHILS NFR BLD AUTO: 74.4 % — SIGNIFICANT CHANGE UP (ref 43–77)
NITRITE UR-MCNC: NEGATIVE — SIGNIFICANT CHANGE UP
PH UR: 6.5 — SIGNIFICANT CHANGE UP (ref 5–8)
PLATELET # BLD AUTO: 142 K/UL — LOW (ref 150–400)
POTASSIUM SERPL-MCNC: 4.1 MMOL/L — SIGNIFICANT CHANGE UP (ref 3.5–5.3)
POTASSIUM SERPL-SCNC: 4.1 MMOL/L — SIGNIFICANT CHANGE UP (ref 3.5–5.3)
PROT ?TM UR-MCNC: 4 MG/DL — SIGNIFICANT CHANGE UP (ref 0–12)
PROT SERPL-MCNC: 6.5 G/DL — LOW (ref 6.6–8.7)
PROT UR-MCNC: NEGATIVE — SIGNIFICANT CHANGE UP
PROT/CREAT UR-RTO: 0.2 RATIO — SIGNIFICANT CHANGE UP
PROTHROM AB SERPL-ACNC: 10.2 SEC — SIGNIFICANT CHANGE UP (ref 9.5–13)
RBC # BLD: 4.37 M/UL — SIGNIFICANT CHANGE UP (ref 3.8–5.2)
RBC # FLD: 14.6 % — HIGH (ref 10.3–14.5)
SODIUM SERPL-SCNC: 136 MMOL/L — SIGNIFICANT CHANGE UP (ref 135–145)
SP GR SPEC: 1 — LOW (ref 1.01–1.02)
URATE SERPL-MCNC: 5.7 MG/DL — SIGNIFICANT CHANGE UP (ref 2.4–5.7)
UROBILINOGEN FLD QL: NEGATIVE MG/DL — SIGNIFICANT CHANGE UP
WBC # BLD: 10.19 K/UL — SIGNIFICANT CHANGE UP (ref 3.8–10.5)
WBC # FLD AUTO: 10.19 K/UL — SIGNIFICANT CHANGE UP (ref 3.8–10.5)

## 2023-08-06 PROCEDURE — 59400 OBSTETRICAL CARE: CPT | Mod: U9

## 2023-08-06 PROCEDURE — 88302 TISSUE EXAM BY PATHOLOGIST: CPT | Mod: 26

## 2023-08-06 RX ORDER — SODIUM CHLORIDE 9 MG/ML
1000 INJECTION, SOLUTION INTRAVENOUS
Refills: 0 | Status: DISCONTINUED | OUTPATIENT
Start: 2023-08-06 | End: 2023-08-06

## 2023-08-06 RX ORDER — IBUPROFEN 200 MG
600 TABLET ORAL EVERY 6 HOURS
Refills: 0 | Status: COMPLETED | OUTPATIENT
Start: 2023-08-06 | End: 2024-07-04

## 2023-08-06 RX ORDER — OXYTOCIN 10 UNIT/ML
41.67 VIAL (ML) INJECTION
Qty: 20 | Refills: 0 | Status: DISCONTINUED | OUTPATIENT
Start: 2023-08-06 | End: 2023-08-08

## 2023-08-06 RX ORDER — CITRIC ACID/SODIUM CITRATE 300-500 MG
30 SOLUTION, ORAL ORAL ONCE
Refills: 0 | Status: DISCONTINUED | OUTPATIENT
Start: 2023-08-06 | End: 2023-08-06

## 2023-08-06 RX ORDER — TETANUS TOXOID, REDUCED DIPHTHERIA TOXOID AND ACELLULAR PERTUSSIS VACCINE, ADSORBED 5; 2.5; 8; 8; 2.5 [IU]/.5ML; [IU]/.5ML; UG/.5ML; UG/.5ML; UG/.5ML
0.5 SUSPENSION INTRAMUSCULAR ONCE
Refills: 0 | Status: DISCONTINUED | OUTPATIENT
Start: 2023-08-06 | End: 2023-08-08

## 2023-08-06 RX ORDER — HYDROCORTISONE 1 %
1 OINTMENT (GRAM) TOPICAL EVERY 6 HOURS
Refills: 0 | Status: DISCONTINUED | OUTPATIENT
Start: 2023-08-06 | End: 2023-08-08

## 2023-08-06 RX ORDER — DIPHENHYDRAMINE HCL 50 MG
25 CAPSULE ORAL EVERY 6 HOURS
Refills: 0 | Status: DISCONTINUED | OUTPATIENT
Start: 2023-08-06 | End: 2023-08-08

## 2023-08-06 RX ORDER — SODIUM CHLORIDE 9 MG/ML
1000 INJECTION INTRAMUSCULAR; INTRAVENOUS; SUBCUTANEOUS ONCE
Refills: 0 | Status: DISCONTINUED | OUTPATIENT
Start: 2023-08-06 | End: 2023-08-08

## 2023-08-06 RX ORDER — OXYTOCIN 10 UNIT/ML
VIAL (ML) INJECTION
Qty: 30 | Refills: 0 | Status: DISCONTINUED | OUTPATIENT
Start: 2023-08-06 | End: 2023-08-06

## 2023-08-06 RX ORDER — KETOROLAC TROMETHAMINE 30 MG/ML
30 SYRINGE (ML) INJECTION ONCE
Refills: 0 | Status: DISCONTINUED | OUTPATIENT
Start: 2023-08-06 | End: 2023-08-06

## 2023-08-06 RX ORDER — SODIUM CHLORIDE 9 MG/ML
3 INJECTION INTRAMUSCULAR; INTRAVENOUS; SUBCUTANEOUS EVERY 8 HOURS
Refills: 0 | Status: DISCONTINUED | OUTPATIENT
Start: 2023-08-06 | End: 2023-08-08

## 2023-08-06 RX ORDER — OXYTOCIN 10 UNIT/ML
333.33 VIAL (ML) INJECTION
Qty: 20 | Refills: 0 | Status: DISCONTINUED | OUTPATIENT
Start: 2023-08-06 | End: 2023-08-06

## 2023-08-06 RX ORDER — OXYCODONE HYDROCHLORIDE 5 MG/1
5 TABLET ORAL
Refills: 0 | Status: DISCONTINUED | OUTPATIENT
Start: 2023-08-06 | End: 2023-08-08

## 2023-08-06 RX ORDER — ACETAMINOPHEN 500 MG
1000 TABLET ORAL ONCE
Refills: 0 | Status: COMPLETED | OUTPATIENT
Start: 2023-08-06 | End: 2023-08-06

## 2023-08-06 RX ORDER — MAGNESIUM HYDROXIDE 400 MG/1
30 TABLET, CHEWABLE ORAL
Refills: 0 | Status: DISCONTINUED | OUTPATIENT
Start: 2023-08-06 | End: 2023-08-08

## 2023-08-06 RX ORDER — LANOLIN
1 OINTMENT (GRAM) TOPICAL EVERY 6 HOURS
Refills: 0 | Status: DISCONTINUED | OUTPATIENT
Start: 2023-08-06 | End: 2023-08-08

## 2023-08-06 RX ORDER — DIBUCAINE 1 %
1 OINTMENT (GRAM) RECTAL EVERY 6 HOURS
Refills: 0 | Status: DISCONTINUED | OUTPATIENT
Start: 2023-08-06 | End: 2023-08-08

## 2023-08-06 RX ORDER — PRAMOXINE HYDROCHLORIDE 150 MG/15G
1 AEROSOL, FOAM RECTAL EVERY 4 HOURS
Refills: 0 | Status: DISCONTINUED | OUTPATIENT
Start: 2023-08-06 | End: 2023-08-08

## 2023-08-06 RX ORDER — OXYTOCIN 10 UNIT/ML
333.33 VIAL (ML) INJECTION
Qty: 20 | Refills: 0 | Status: COMPLETED | OUTPATIENT
Start: 2023-08-06 | End: 2023-08-06

## 2023-08-06 RX ORDER — CHLORHEXIDINE GLUCONATE 213 G/1000ML
1 SOLUTION TOPICAL DAILY
Refills: 0 | Status: DISCONTINUED | OUTPATIENT
Start: 2023-08-06 | End: 2023-08-06

## 2023-08-06 RX ORDER — ACETAMINOPHEN 500 MG
975 TABLET ORAL
Refills: 0 | Status: DISCONTINUED | OUTPATIENT
Start: 2023-08-06 | End: 2023-08-08

## 2023-08-06 RX ORDER — BENZOCAINE 10 %
1 GEL (GRAM) MUCOUS MEMBRANE EVERY 6 HOURS
Refills: 0 | Status: DISCONTINUED | OUTPATIENT
Start: 2023-08-06 | End: 2023-08-08

## 2023-08-06 RX ORDER — AER TRAVELER 0.5 G/1
1 SOLUTION RECTAL; TOPICAL EVERY 4 HOURS
Refills: 0 | Status: DISCONTINUED | OUTPATIENT
Start: 2023-08-06 | End: 2023-08-08

## 2023-08-06 RX ORDER — SIMETHICONE 80 MG/1
80 TABLET, CHEWABLE ORAL EVERY 4 HOURS
Refills: 0 | Status: DISCONTINUED | OUTPATIENT
Start: 2023-08-06 | End: 2023-08-08

## 2023-08-06 RX ORDER — OXYCODONE HYDROCHLORIDE 5 MG/1
5 TABLET ORAL ONCE
Refills: 0 | Status: DISCONTINUED | OUTPATIENT
Start: 2023-08-06 | End: 2023-08-08

## 2023-08-06 RX ADMIN — Medication 30 MILLIGRAM(S): at 21:55

## 2023-08-06 RX ADMIN — Medication 1000 MILLIUNIT(S)/MIN: at 21:26

## 2023-08-06 RX ADMIN — SODIUM CHLORIDE 125 MILLILITER(S): 9 INJECTION, SOLUTION INTRAVENOUS at 09:44

## 2023-08-06 RX ADMIN — Medication 400 MILLIGRAM(S): at 22:15

## 2023-08-06 RX ADMIN — Medication 2 MILLIUNIT(S)/MIN: at 11:52

## 2023-08-06 NOTE — OB PROVIDER H&P - PRETERM DELIVERIES, OB PROFILE
BP READINGS PROVIDED BY PATIENT    DATE TIME BP HEART RATE / PULSE   06/5/21  133/60 67   6/7/21  129/66 73   6/9/21  123/55 74   6/11/21  117/54 79   6/13/21  129/60 72   6/15/21  115/52 73   6/17/21  103/53 72   6/19/21  115/59 67   6/21/21  110/41 72   6/23/21  122/61 72      6/25/21   122/60 70  
Pt notified to continue to monitor  
0

## 2023-08-06 NOTE — OB PROVIDER DELIVERY SUMMARY - NSSELHIDDEN_OBGYN_ALL_OB_FT
[NS_DeliveryAttending1_OBGYN_ALL_OB_FT:[id number]==],[NS_DeliveryAttending2_OBGYN_ALL_OB_FT:[email or web address]=],[NS_DeliveryRN_OBGYN_ALL_OB_FT:[id number]=],[NS_DeliveryAssist2_OBGYN_ALL_OB_FT:[id number]=]

## 2023-08-06 NOTE — OB RN PATIENT PROFILE - FALL HARM RISK - UNIVERSAL INTERVENTIONS
Bed in lowest position, wheels locked, appropriate side rails in place/Call bell, personal items and telephone in reach/Instruct patient to call for assistance before getting out of bed or chair/Non-slip footwear when patient is out of bed/[place] to call system/Physically safe environment - no spills, clutter or unnecessary equipment/Purposeful Proactive Rounding/Room/bathroom lighting operational, light cord in reach

## 2023-08-06 NOTE — OB PROVIDER H&P - NSHPPHYSICALEXAM_GEN_ALL_CORE
T(C): 36.1 (08-06-23 @ 08:20), Max: 36.6 (08-06-23 @ 05:55)  HR: 90 (08-06-23 @ 07:09) (90 - 104)  BP: 132/90 (08-06-23 @ 07:09) (124/86 - 132/90)  RR: 16 (08-06-23 @ 07:06) (16 - 16)    Gen: NAD, well-appearing, AAOx3   Abd: Soft, gravid  Ext: non-tender, non-edematous  SSE:   SVE:    Bedside sono:  FHT:  [place]:       A/P:   -Admit to L&D  -Consent  -Admission labs  -NPO, except ice chips   -IV fluids  -Labor: Intact/*ROM. Latent/Active labor. Janice *.   -Fetus: Cat I tracing. Continuous toco and fetal monitoring.   -GBS: Negative, no GBS ppx required   -Analgesia:     Discussed with  _ T(C): 36.1 (08-06-23 @ 08:20), Max: 36.6 (08-06-23 @ 05:55)  HR: 90 (08-06-23 @ 07:09) (90 - 104)  BP: 132/90 (08-06-23 @ 07:09) (124/86 - 132/90)  RR: 16 (08-06-23 @ 07:06) (16 - 16)    Gen: NAD, well-appearing, AAOx3   Abd: Soft, gravid  Ext: non-tender, non-edematous  SVE:  3/50/-3 soft  Bedside sono: vertex, anterior fundal placenta  FHT: baseline 130bpm, + accels, -decels  [place]: ctx q 5min T(C): 36.1 (08-06-23 @ 08:20), Max: 36.6 (08-06-23 @ 05:55)  HR: 90 (08-06-23 @ 07:09) (90 - 104)  BP: 132/90 (08-06-23 @ 07:09) (124/86 - 132/90)  RR: 16 (08-06-23 @ 07:06) (16 - 16)    Gen: NAD, well-appearing, AAOx3   Abd: Soft, gravid  Ext: non-tender, non-edematous  SVE:  3/50/-3 soft  Bedside sono: vertex, anterior fundal placenta  FHT: baseline 130bpm, + accels, -decels, mod variability  [place]: ctx q 5min

## 2023-08-06 NOTE — OB RN DELIVERY SUMMARY - NS_SEPSISRSKCALC_OBGYN_ALL_OB_FT
EOS calculated successfully. EOS Risk Factor: 0.5/1000 live births ([facility] national incidence); GA=38w6d; Temp=98.42; ROM=3.567; GBS='Negative'; Antibiotics='Broad spectrum antibiotics > 4 hrs prior to birth'

## 2023-08-06 NOTE — OB PROVIDER LABOR PROGRESS NOTE - ASSESSMENT
Cat I tracing  progressing in labor  now AROM clear on exam  pitocin at 8, contractions adequate  will continue to monitor    discussed with attending Dr Palomares
Cat III tracing, resuscitating   IUPC placed, possible amnioinfusion if resuscitative measures initially odnt work  continue to monitor    to be discussed with attending Dr Monteor
Pt admitted in labor, now [facility].   -VSS   -Cat 1 tracing   -COntinue current management

## 2023-08-06 NOTE — OB PROVIDER H&P - HISTORY OF PRESENT ILLNESS
32y  at 38w6d GA by LMP consistent with 1st trimester sono who presents to L&D for early labor at term. Ctx since 0200 this AM. Reports they have gotten more painful and closer together since that time. Reports dizziness and headache this AM. Has not taken anything for the HA.   Patient denies vaginal bleeding, leakage of fluid. She endorses good fetal movement. Denies fevers, chills, nausea, vomiting, chest pain, SOB.    JAMI: 23  LMP: 22  Prenatal course is significant for:  Rh- s/p Rhogam    POB: term  x 3, reports IOL for each pregnancy. Uncomp  PGYN: +fibroids, -ovarian cysts, denies STD hx, denies abnormal PAPs   PMH: Asthma (albuterol inhaler last used May)  PSH: Denies  SH: Denies EtOH, tobacco and illicit drug use during this pregnancy; feels safe at home   Meds: PNVs, ASA, albuterol  Allergies: NKDA    BMI: 43  Sono: vertex, anterior fundal placenta on bedside sono  EFW:    32y  at 38w6d GA by LMP consistent with 1st trimester sono who presents to L&D for early labor at term. Ctx since 0200 this AM. Reports they have gotten more painful and closer together since that time. Reports dizziness and headache this AM. Has not taken anything for the HA. BPs have been wnl during this pregnancy.   Patient denies vaginal bleeding, leakage of fluid. She endorses good fetal movement. Denies fevers, chills, nausea, vomiting, chest pain, SOB.    JAMI: 23  LMP: 22  Prenatal course is significant for:  Rh- s/p Rhogam    POB: term  x 3, reports IOL for each pregnancy. G1 pre-e, G2 gHTN, G3 uncomp  PGYN: +fibroids, -ovarian cysts, denies STD hx, denies abnormal PAPs   PMH: Asthma (albuterol inhaler last used May)  PSH: Denies  SH: Denies EtOH, tobacco and illicit drug use during this pregnancy; feels safe at home   Meds: PNVs, ASA, albuterol  Allergies: NKDA    BMI: 43  Sono: vertex, anterior fundal placenta on bedside sono  EFW:

## 2023-08-06 NOTE — OB RN PATIENT PROFILE - IN THE PAST 12 MONTHS HAVE YOU USED DRUGS OTHER THAN THOSE REQUIRED FOR MEDICAL REASON?
Thalidomide Counseling: I discussed with the patient the risks of thalidomide including but not limited to birth defects, anxiety, weakness, chest pain, dizziness, cough and severe allergy. No

## 2023-08-06 NOTE — OB PROVIDER LABOR PROGRESS NOTE - NS_OBIHIFHRDETAILS_OBGYN_ALL_OB_FT
baseline 140s, moderate variability, +accels, -decels
baseline 145, moderate variability, +accels, variable decelerations
baseline 145, moderate variability, +accels, no decels

## 2023-08-06 NOTE — OB PROVIDER DELIVERY SUMMARY - NSPROVIDERDELIVERYNOTE_OBGYN_ALL_OB_FT
Patient noted to be fully dilated, and after a period of pushing, patient was prepped and draped for delivery. In conjunction with maternal effort, she delivered a viable male infant. Head delivered GERMAINE, no nuchal cord. After 30 sec, delayed cord clamping was performed then  was provided to parent for skin-to-skin. Cord blood collected for blood gas. Placenta delivered spontaneously and intact, no gross pathology, 3 vessel cord appreciated. Perineum and vagina were inspected and no lacerations noted. Excellent hemostasis obtained.    EBL 129cc    Sex: M, Delivery Time: ,  Weight: tbd g, APGARs: 9/9

## 2023-08-06 NOTE — OB RN DELIVERY SUMMARY - NSSELHIDDEN_OBGYN_ALL_OB_FT
[NS_DeliveryAttending1_OBGYN_ALL_OB_FT:[id number]],[NS_DeliveryAttending2_OBGYN_ALL_OB_FT:[email or web address]=],[NS_DeliveryRN_OBGYN_ALL_OB_FT:[id number]] [NS_DeliveryAttending1_OBGYN_ALL_OB_FT:[id number]],[NS_DeliveryAttending2_OBGYN_ALL_OB_FT:[email or web address]=],[NS_DeliveryRN_OBGYN_ALL_OB_FT:[id number]=],[NS_DeliveryAssist2_OBGYN_ALL_OB_FT:[id number]=]

## 2023-08-06 NOTE — OB RN DELIVERY SUMMARY - NS_BREASTINHOURA_OBGYN_ALL_OB
Natalie called back and states that per [facility] pt does not need to be seen for wheelchair repair. Will fax order and recent OV notes to [facility].  
Okay for order but I'm not sure whether that'll be covered without a face-to-face encounter.  
Patient's wheel chair needs repair and  [facility] needs an order to repair it. The order must read wheel-chair repair and they need the most recent notes from last office visit. This all can be faxed to [phone] Att. Service Repair Dept. [facility] number is [phone].    Ally with [facility] can be reached at [phone].  
Please advise on order.  
Spoke with Natalie and notified her of message from MD. LOV with PCP on 2/20 for acute otitis media.       Natalie is going to call back after speaking to [facility] to see if she needs an appt  
Offered, declined by mother

## 2023-08-06 NOTE — OB PROVIDER LABOR PROGRESS NOTE - NS_SUBJECTIVE/OBJECTIVE_OBGYN_ALL_OB_FT
Patient seen and examined at bedside. She is doing well. No complaints at this time.
patient reevaluated
pt reevaluated in setting of variable decelerations

## 2023-08-06 NOTE — OB RN TRIAGE NOTE - LIVES WITH, PROFILE
81 yo male PMH Afib (home on eliquis), CAD s/p PCI to LAD (Nov 2018), CHFrEF (EF 20-25%), life vest, BPH, HTN, HLD, transferred to [facility] from [facility] for acute cholecystitis.  HIDA showed probable acute cholecystitis and he was deemed a poor surgical candidate so he underwent percutaneous cholecystostomy tube placement on 2/25.  He had a brief episode of hypotension requiring dopamine, which precipitated afib with RVR, and the dopamine was subsequently discontinued.  Course complicated by oliguric renal failure.  [place] dante catheter placed on 2/26.  Transferred to [facility] on 2/27 due to worsening renal failure with anticipated need for CVVHD.   [place] dante catheter removed;  cholecystosomy tube still draining;   kidney functioning improving, patient downgraded to medical service     >acute Cholecystitis :  -s/p Cholecystostomy tube placed on 2/25/2019;  -patient is afebrile, wbcs nl     -as per surgery No further acute surgical interventions needed, tube will be in place for 6-8 weeks.  Draining 200cc bile in last 24hours;    -plan of care discussed with patient and daughter (Luana);  fully aware that he needs to follow up with Surgeon  from [facility] where tube placed;     -surgery signed off case;  -c/w abxs   -f/u bcxs   -Id consult noted and appreciated     >TONY (acute kidney injury)/ patient was transferred for possible need for cvvhd, renal function improving;  likely has underline ckd   -patient given 250cc bolus fluid, then palced on Plasmalyte maintenance fluid on board  -monitor renal function -strict i's and o's;    -nephro on board   - original whitehead placed before transfer; subsequent renal US at [facility] showed misplaced whitehead;   whitehead catheter removed, subsequently replaced by urology via cystoscopy overnight;    -c/w whitehead   -hematuria likely 2/2 traumatic placement;  -lasix on hold in the setting of tony;   -check urine na, osm     >hypotension / likely cardiogenic / sepsis / required iv dopamine / now dcd   -sbp now in 120s   -c/w abxs   -resumed on low dose bb     >Chronic systolic congestive heart failure/ baseline EF 20-25%;/ hx of cad s/p MI in dec 2018 / has life vest at home   -continue metoprolol;  -hold lasix for now , patient takes lasix 40 po daily at home;     > Atrial fibrillation,   -on eliquis at home; was  switched to heparin sq in micu   -will switch back to eliquis   -continue metoprolol;     > physical deconditioning :   -pt eval     >code status: pt is full code children/significant other

## 2023-08-06 NOTE — OB PROVIDER H&P - ASSESSMENT
A/P: 32y  at 38w6d GA by LMP consistent with 1st trimester sono who presents to L&D for early labor at term.  -Admit to L&D  -Consent  -Admission labs  -NPO, except ice chips   -IV fluids  -Labor: Intact. Latent. Cervical change since last exam. Jan[place] q5min  -Augmentation: pitocin  -Fetus: Cat I tracing. Continuous toco and fetal monitoring.   -GBS: Negative, no GBS ppx required   -Analgesia: epidural when pt desires    Discussed with Dr. Palomares

## 2023-08-07 ENCOUNTER — TRANSCRIPTION ENCOUNTER (OUTPATIENT)
Age: 33
End: 2023-08-07

## 2023-08-07 ENCOUNTER — APPOINTMENT (OUTPATIENT)
Dept: OBGYN | Facility: CLINIC | Age: 33
End: 2023-08-07

## 2023-08-07 DIAGNOSIS — Z87.59 PERSONAL HISTORY OF OTHER COMPLICATIONS OF PREGNANCY, CHILDBIRTH AND THE PUERPERIUM: ICD-10-CM

## 2023-08-07 DIAGNOSIS — O99.513 DISEASES OF THE RESPIRATORY SYSTEM COMPLICATING PREGNANCY, THIRD TRIMESTER: ICD-10-CM

## 2023-08-07 DIAGNOSIS — O36.8130 DECREASED FETAL MOVEMENTS, THIRD TRIMESTER, NOT APPLICABLE OR UNSPECIFIED: ICD-10-CM

## 2023-08-07 DIAGNOSIS — R10.2 PELVIC AND PERINEAL PAIN: ICD-10-CM

## 2023-08-07 DIAGNOSIS — Z3A.38 38 WEEKS GESTATION OF PREGNANCY: ICD-10-CM

## 2023-08-07 DIAGNOSIS — J45.909 UNSPECIFIED ASTHMA, UNCOMPLICATED: ICD-10-CM

## 2023-08-07 DIAGNOSIS — O26.893 OTHER SPECIFIED PREGNANCY RELATED CONDITIONS, THIRD TRIMESTER: ICD-10-CM

## 2023-08-07 DIAGNOSIS — R51.9 HEADACHE, UNSPECIFIED: ICD-10-CM

## 2023-08-07 LAB
ABO RH CONFIRMATION: SIGNIFICANT CHANGE UP
ALBUMIN SERPL ELPH-MCNC: 2.7 G/DL — LOW (ref 3.3–5.2)
ALP SERPL-CCNC: 154 U/L — HIGH (ref 40–120)
ALT FLD-CCNC: 12 U/L — SIGNIFICANT CHANGE UP
ANION GAP SERPL CALC-SCNC: 14 MMOL/L — SIGNIFICANT CHANGE UP (ref 5–17)
AST SERPL-CCNC: 20 U/L — SIGNIFICANT CHANGE UP
BASOPHILS # BLD AUTO: 0.02 K/UL — SIGNIFICANT CHANGE UP (ref 0–0.2)
BASOPHILS NFR BLD AUTO: 0.2 % — SIGNIFICANT CHANGE UP (ref 0–2)
BILIRUB SERPL-MCNC: 0.2 MG/DL — LOW (ref 0.4–2)
BUN SERPL-MCNC: 14 MG/DL — SIGNIFICANT CHANGE UP (ref 8–20)
CALCIUM SERPL-MCNC: 8.2 MG/DL — LOW (ref 8.4–10.5)
CHLORIDE SERPL-SCNC: 106 MMOL/L — SIGNIFICANT CHANGE UP (ref 96–108)
CO2 SERPL-SCNC: 18 MMOL/L — LOW (ref 22–29)
CREAT SERPL-MCNC: 0.78 MG/DL — SIGNIFICANT CHANGE UP (ref 0.5–1.3)
EGFR: 102 ML/MIN/1.73M2 — SIGNIFICANT CHANGE UP
EOSINOPHIL # BLD AUTO: 0.01 K/UL — SIGNIFICANT CHANGE UP (ref 0–0.5)
EOSINOPHIL NFR BLD AUTO: 0.1 % — SIGNIFICANT CHANGE UP (ref 0–6)
FETAL SCREEN: (no result)
GLUCOSE SERPL-MCNC: 98 MG/DL — SIGNIFICANT CHANGE UP (ref 70–99)
HCT VFR BLD CALC: 32.6 % — LOW (ref 34.5–45)
HCT VFR BLD CALC: 34.1 % — LOW (ref 34.5–45)
HGB BLD-MCNC: 11.1 G/DL — LOW (ref 11.5–15.5)
HGB BLD-MCNC: 11.3 G/DL — LOW (ref 11.5–15.5)
IMM GRANULOCYTES NFR BLD AUTO: 0.7 % — SIGNIFICANT CHANGE UP (ref 0–0.9)
KLEIHAUER-BETKE CALCULATION: 0 % — SIGNIFICANT CHANGE UP (ref 0–0.3)
LYMPHOCYTES # BLD AUTO: 1.6 K/UL — SIGNIFICANT CHANGE UP (ref 1–3.3)
LYMPHOCYTES # BLD AUTO: 15.5 % — SIGNIFICANT CHANGE UP (ref 13–44)
MCHC RBC-ENTMCNC: 28.8 PG — SIGNIFICANT CHANGE UP (ref 27–34)
MCHC RBC-ENTMCNC: 29.5 PG — SIGNIFICANT CHANGE UP (ref 27–34)
MCHC RBC-ENTMCNC: 33.1 GM/DL — SIGNIFICANT CHANGE UP (ref 32–36)
MCHC RBC-ENTMCNC: 34 GM/DL — SIGNIFICANT CHANGE UP (ref 32–36)
MCV RBC AUTO: 86.7 FL — SIGNIFICANT CHANGE UP (ref 80–100)
MCV RBC AUTO: 87 FL — SIGNIFICANT CHANGE UP (ref 80–100)
MONOCYTES # BLD AUTO: 0.59 K/UL — SIGNIFICANT CHANGE UP (ref 0–0.9)
MONOCYTES NFR BLD AUTO: 5.7 % — SIGNIFICANT CHANGE UP (ref 2–14)
NEUTROPHILS # BLD AUTO: 8 K/UL — HIGH (ref 1.8–7.4)
NEUTROPHILS NFR BLD AUTO: 77.8 % — HIGH (ref 43–77)
PLATELET # BLD AUTO: 106 K/UL — LOW (ref 150–400)
PLATELET # BLD AUTO: 107 K/UL — LOW (ref 150–400)
POTASSIUM SERPL-MCNC: 3.8 MMOL/L — SIGNIFICANT CHANGE UP (ref 3.5–5.3)
POTASSIUM SERPL-SCNC: 3.8 MMOL/L — SIGNIFICANT CHANGE UP (ref 3.5–5.3)
PROT SERPL-MCNC: 5.1 G/DL — LOW (ref 6.6–8.7)
RBC # BLD: 3.76 M/UL — LOW (ref 3.8–5.2)
RBC # BLD: 3.92 M/UL — SIGNIFICANT CHANGE UP (ref 3.8–5.2)
RBC # FLD: 14.8 % — HIGH (ref 10.3–14.5)
RBC # FLD: 14.9 % — HIGH (ref 10.3–14.5)
SODIUM SERPL-SCNC: 138 MMOL/L — SIGNIFICANT CHANGE UP (ref 135–145)
T PALLIDUM AB TITR SER: NEGATIVE — SIGNIFICANT CHANGE UP
WBC # BLD: 10.29 K/UL — SIGNIFICANT CHANGE UP (ref 3.8–10.5)
WBC # BLD: 8.36 K/UL — SIGNIFICANT CHANGE UP (ref 3.8–10.5)
WBC # FLD AUTO: 10.29 K/UL — SIGNIFICANT CHANGE UP (ref 3.8–10.5)
WBC # FLD AUTO: 8.36 K/UL — SIGNIFICANT CHANGE UP (ref 3.8–10.5)

## 2023-08-07 PROCEDURE — 58605 DIVISION OF FALLOPIAN TUBE: CPT

## 2023-08-07 RX ORDER — CEFAZOLIN SODIUM 1 G
2000 VIAL (EA) INJECTION ONCE
Refills: 0 | Status: DISCONTINUED | OUTPATIENT
Start: 2023-08-07 | End: 2023-08-08

## 2023-08-07 RX ORDER — SODIUM CHLORIDE 9 MG/ML
1000 INJECTION, SOLUTION INTRAVENOUS
Refills: 0 | Status: DISCONTINUED | OUTPATIENT
Start: 2023-08-07 | End: 2023-08-08

## 2023-08-07 RX ORDER — ONDANSETRON 8 MG/1
8 TABLET, FILM COATED ORAL ONCE
Refills: 0 | Status: DISCONTINUED | OUTPATIENT
Start: 2023-08-07 | End: 2023-08-08

## 2023-08-07 RX ORDER — IBUPROFEN 200 MG
600 TABLET ORAL EVERY 6 HOURS
Refills: 0 | Status: DISCONTINUED | OUTPATIENT
Start: 2023-08-07 | End: 2023-08-08

## 2023-08-07 RX ORDER — FENTANYL CITRATE 50 UG/ML
50 INJECTION INTRAVENOUS
Refills: 0 | Status: DISCONTINUED | OUTPATIENT
Start: 2023-08-07 | End: 2023-08-08

## 2023-08-07 RX ORDER — CHLORHEXIDINE GLUCONATE 213 G/1000ML
1 SOLUTION TOPICAL ONCE
Refills: 0 | Status: COMPLETED | OUTPATIENT
Start: 2023-08-07 | End: 2023-08-07

## 2023-08-07 RX ORDER — SODIUM CHLORIDE 9 MG/ML
1000 INJECTION, SOLUTION INTRAVENOUS ONCE
Refills: 0 | Status: COMPLETED | OUTPATIENT
Start: 2023-08-07 | End: 2023-08-07

## 2023-08-07 RX ORDER — CEFAZOLIN SODIUM 1 G
2000 VIAL (EA) INJECTION ONCE
Refills: 0 | Status: DISCONTINUED | OUTPATIENT
Start: 2023-08-07 | End: 2023-08-07

## 2023-08-07 RX ORDER — FAMOTIDINE 10 MG/ML
20 INJECTION INTRAVENOUS ONCE
Refills: 0 | Status: DISCONTINUED | OUTPATIENT
Start: 2023-08-07 | End: 2023-08-07

## 2023-08-07 RX ORDER — FAMOTIDINE 10 MG/ML
20 INJECTION INTRAVENOUS ONCE
Refills: 0 | Status: DISCONTINUED | OUTPATIENT
Start: 2023-08-07 | End: 2023-08-08

## 2023-08-07 RX ORDER — FENTANYL CITRATE 50 UG/ML
25 INJECTION INTRAVENOUS
Refills: 0 | Status: DISCONTINUED | OUTPATIENT
Start: 2023-08-07 | End: 2023-08-08

## 2023-08-07 RX ADMIN — Medication 600 MILLIGRAM(S): at 05:46

## 2023-08-07 RX ADMIN — CHLORHEXIDINE GLUCONATE 1 APPLICATION(S): 213 SOLUTION TOPICAL at 16:33

## 2023-08-07 RX ADMIN — SODIUM CHLORIDE 1000 MILLILITER(S): 9 INJECTION, SOLUTION INTRAVENOUS at 16:33

## 2023-08-07 RX ADMIN — Medication 600 MILLIGRAM(S): at 23:40

## 2023-08-07 RX ADMIN — Medication 975 MILLIGRAM(S): at 16:16

## 2023-08-07 RX ADMIN — Medication 975 MILLIGRAM(S): at 21:26

## 2023-08-07 RX ADMIN — SODIUM CHLORIDE 75 MILLILITER(S): 9 INJECTION, SOLUTION INTRAVENOUS at 19:02

## 2023-08-07 RX ADMIN — Medication 975 MILLIGRAM(S): at 15:44

## 2023-08-07 NOTE — DISCHARGE NOTE OB - PATIENT PORTAL LINK FT
You can access the FollowMyHealth Patient Portal offered by [facility] by registering at the following website: [email or web address]. By joining [employer]’s FollowMyHealth portal, you will also be able to view your health information using other applications (apps) compatible with our system.

## 2023-08-07 NOTE — DISCHARGE NOTE OB - PLAN OF CARE
1) Please take ibuprofen and/or Tylenol as needed for pain as prescribed.  2) Nothing in the vagina for 6 weeks (including no sex, no tampons, and no douching).  3) Please call your doctor for a follow up your postpartum appointment in 4-6 weeks.  4) Please continue taking vitamins postpartum.   5) Please call the office sooner if you have heavy vaginal bleeding, severe abdominal pain, or fever > 100.4F.  6) You may resume regular daily activity as tolerated Follow up with your OB for a blood pressure check in 1 week and bring a blood pressure log. Please buy a blood pressure cuff if you do not have one at home and take your blood pressure twice a day while at home and at rest. Continue any prescribed antihypertensive medication as long as blood pressures are above 100/60. You should call your doctor sooner if you develop changes in vision, headache refractory to pain medication, or upper abdominal pain. Please call sooner if there are any other concerns. Patient underwent Bilateral Tubal Ligation procedure for postpartum contraception, no complications in procedure. F/u in postpartum appointment

## 2023-08-07 NOTE — DISCHARGE NOTE OB - CARE PLAN
Principal Discharge DX:	Spontaneous vaginal delivery  Assessment and plan of treatment:	1) Please take ibuprofen and/or Tylenol as needed for pain as prescribed.  2) Nothing in the vagina for 6 weeks (including no sex, no tampons, and no douching).  3) Please call your doctor for a follow up your postpartum appointment in 4-6 weeks.  4) Please continue taking vitamins postpartum.   5) Please call the office sooner if you have heavy vaginal bleeding, severe abdominal pain, or fever > 100.4F.  6) You may resume regular daily activity as tolerated  Secondary Diagnosis:	Gestational hypertension  Assessment and plan of treatment:	Follow up with your OB for a blood pressure check in 1 week and bring a blood pressure log. Please buy a blood pressure cuff if you do not have one at home and take your blood pressure twice a day while at home and at rest. Continue any prescribed antihypertensive medication as long as blood pressures are above 100/60. You should call your doctor sooner if you develop changes in vision, headache refractory to pain medication, or upper abdominal pain. Please call sooner if there are any other concerns.  Secondary Diagnosis:	Status post bilateral salpingectomy  Assessment and plan of treatment:	Patient underwent Bilateral Tubal Ligation procedure for postpartum contraception, no complications in procedure. F/u in postpartum appointment   1

## 2023-08-07 NOTE — OB RN INTRAOPERATIVE NOTE - NSOBSELHIDDEN_OBGYN_ALL_OB_FT
[NSOBAttendingProcedure1_OBGYN_ALL_OB_FT:[id number]],[NSRNCirculatorProcedure1_OBGYN_ALL_OB_FT:[id number]=]

## 2023-08-07 NOTE — PROGRESS NOTE ADULT - ASSESSMENT
ASSESSMENT:   HUONG VALENTIN is a 32y  PPD1 s/p . Patient has no other complaints at this time, is otherwise clinically and hemodynamically stable.   Keldron boy is with patient at bedside. Patient reports she does not desire circumcision.     Hgb: 12.9 > 11.3  Rub: I/I  Rh neg - f/u fetal screen    #gHTN  - BPs wnl     #Postpartum  - Continue routine post-partum care  - Pain management PRN  - Encourage maternal- bonding    - Diet: Regular, advance as tolerated  - DVT ppx: Ambulation encouraged  - Dispo: Home PPD2 per attending's approval

## 2023-08-07 NOTE — DISCHARGE NOTE OB - HOSPITAL COURSE
Patient underwent a normal spontaneous vaginal delivery. Post-partum course was uncomplicated. Pain is well controlled with PRN medication. She has no difficulty with ambulation, voiding, or PO intake. Lab values and vital signs are within normal limits prior to discharge. Patient also underwent postpartum bilateral tubal ligation, uncomplicated. Patient also received Rhogam due to baby being Rh positive.

## 2023-08-07 NOTE — PROGRESS NOTE ADULT - ATTENDING COMMENTS
32y  PPD#1 s/p   - currently with minimal bleeding/lochia only, no symptoms of anemia, post partum Hgb 11.3  - post partum fevers, likely reactionary, now 12hrs afebrile, WBC's WNL, no clinical symptoms of infection/endometritis   - gHTN during intrapartum course, BP's now WNL, PIH labs all reassuring, no clinical symptoms of PIH   - desires post partum tubal sterilization procedure, NPO, will await completion of scheduled C/S's and proceed with procedure   - healthy male infant at bedside, declines circumcision  - vital signs, lab results, and physical exam reassuring   - DVT PPX: ambulation  - anticipated discharge: pending re-evaluation and possible post partum tubal sterilization procedure 32y  PPD#1 s/p   - currently with minimal bleeding/lochia only, no symptoms of anemia, post partum Hgb 11.3  - post partum fevers, likely reactionary, now 12hrs afebrile, WBC's WNL, no clinical symptoms of infection/endometritis   - gHTN during intrapartum course, BP's now WNL, PIH labs all reassuring, no clinical symptoms of PIH   - desires post partum tubal sterilization procedure, NPO, will await completion of scheduled C/S's and proceed with procedure   - maternal Rh NEG status known,  RH POS, fetal screen POS, KB pending, RhoGAM order in and pending administration prior to discharge, will redose if needed pending KB   - healthy male infant at bedside, declines circumcision  - vital signs, lab results, and physical exam reassuring   - DVT PPX: ambulation  - anticipated discharge: pending re-evaluation and possible post partum tubal sterilization procedure

## 2023-08-07 NOTE — DISCHARGE NOTE OB - CARE PROVIDER_API CALL
Alexander Riddle  Obstetrics and Gynecology  [address], [address]  [place] [address]  Phone: [phone]  Fax: [phone]  Established Patient  Follow Up Time:

## 2023-08-07 NOTE — OB RN INTRAOPERATIVE NOTE - NSSELHIDDEN_OBGYN_ALL_OB_FT
[NS_DeliveryAttending1_OBGYN_ALL_OB_FT:[id number]],[NS_DeliveryAttending2_OBGYN_ALL_OB_FT:[email or web address]=],[NS_DeliveryRN_OBGYN_ALL_OB_FT:[id number]],[NS_DeliveryAssist2_OBGYN_ALL_OB_FT:[id number]]

## 2023-08-07 NOTE — DISCHARGE NOTE OB - YES, WALK AS TOLERATED
Constanza Hdz is an 4 y.o.  male with a history of recurrent otitis media secondary to persistent eustachian tube dysfunction. Patient has been thoroughly evaluated and pressure equalizing tube placement was discussed and agreed to his parents. His mother who was at the bedside with him confirms that he breathes quietly at night noticing almost only a mild quiet purring but no snoring. Past Medical History:   Diagnosis Date    COVID-19        Allergies: No Known Allergies    Active Problems:    * No active hospital problems. *  Resolved Problems:    * No resolved hospital problems. *    Blood pressure 115/58, pulse 68, temperature 97 °F (36.1 °C), resp. rate 16, height 4' 11\" (1.499 m), weight 113 lb (51.3 kg), SpO2 98 %. Review of Systems   Denies fevers or difficulty breathing  Mother at the bedside confirms    Physical Exam   The patient is a well-developed well-nourished male child in no acute distress  His voice and speech pattern are within normal limits for his age and gender  I heard no throat clearing coughing or inspiratory stridor  He is breathing without labor    Assessment:  Eustachian tube dysfunction with recurrent otitis media    Plan: To the operating room for pressure equalizing tubes using T tubes for longer duration benefit and increased protection against backflow fluids into the middle ear    The note attached below is for additional information.     Orlando Goodrich MD  12/3/2021           JAROD Mario CNP   Nurse Practitioner   Specialty:  Nurse Practitioner   Progress Notes       Signed   Encounter Date:  11/22/2021                 Signed        Expand All Collapse All        Show:Clear all  [x]Manual[x]Template[]Copied    Added by:  [x]JAROD Boateng CNP      []Trixie for details        [address], NOSE AND THROAT  [place] 482 865 [address] [place] [id number]  Dept: [phone]  Dept Fax: [facility] 6: [phone]     Alfonso Huynh is a 4 y.o. male who was referred by No ref. provider found for:       Chief Complaint   Patient presents with    Follow-up       Patient is here for a follow up tube check, Audiogram completed today       HPI:      Alfonso Huynh is a 4 y.o. male here with father for follow up tube check after Audio. Patient feels his hearing is better than prior to having tubes. He is unsure about his ability to clear his ears. No ear drainage. No nasal symptoms. Father has not witnessed snoring or apneas. Audiogram today shows bilateral mild lower frequency CHL at 250-1000Hz with flat tympanograms and low ear canal volumes. He is doing well in school. History:      No Known Allergies  Current Facility-Administered Medications          Current Outpatient Medications   Medication Sig Dispense Refill    ibuprofen (CHILDRENS ADVIL) 100 MG/5ML suspension Take 5 mLs by mouth every 8 hours as needed for Fever (Take consistently for the first 48 hours then as needed pain; take with meals) 1 Bottle 3    fluticasone (FLONASE) 50 MCG/ACT nasal spray 1 spray by Nasal route nightly 1 Bottle 1    ciprofloxacin (CILOXAN) 0.3 % ophthalmic solution Place 4 drops in ear(s) 2 times daily left ear, keep instilled ear up for 10 min (Patient not taking: Reported on 11/22/2021) 5 mL 2      No current facility-administered medications for this visit.         Past Medical History   History reviewed. No pertinent past medical history.       Past Surgical History         Past Surgical History:   Procedure Laterality Date    CIRCUMCISION        MYRINGOTOMY Bilateral 8/14/2020     BILATERAL MYRINGOTOMY TUBE INSERTION performed by Meño Beckett MD at [facility]         Family History         Family History   Problem Relation Age of Onset    Ulcerative Colitis Mother           gull bladder removed    No Known Problems Father      Ulcerative Colitis Maternal Grandmother         colon resection     Other Maternal Grandfather           diverticultis    Ulcerative Colitis Paternal Grandmother           diverticulitis         Social History           Tobacco Use    Smoking status: Never Smoker    Smokeless tobacco: Never Used   Substance Use Topics    Alcohol use: No                    Subjective:      Review of Systems  Rest of review of systems are negative, except as noted in HPI.      Objective:      Pulse 68   Temp 96.8 °F (36 °C) (Infrared)   Resp 14   Wt 109 lb 1.6 oz (49.5 kg)   SpO2 100%      PHYSICAL EXAM  Constitutional: Oriented to person, place, and time. Appears stated aged. Appears well-developed and well-nourished. HENT:   Head: Normocephalic and atraumatic. Right Ear:  External ear normal. Tympanic membrane intact and retracted with likely effusion. Left Ear:  External ear normal. Tympanic membrane with tube in place; lumen appears to be blocked with debris in medial aspect. TM appears retracted with mild erythema. Nose:  External nose normal. Nasal mucosa normal. No lesions noted. Mouth/Throat:  Good dentition. Oral cavity mucosa normal, no masses or lesions noted. Eyes:  Pupils are equal, round, and reactive to light. Conjunctivae and EOM are normal.   Neck:  Normal range of motion. Neck supple. No JVD present. No tracheal deviation present. No thyromegaly present. No cervical lymphadenopathy noted. Cardiovascular:  Normal rate and rhythym. Pulmonary/Chest:  Effort normal. No stridor or stertor. No respiratory distress. Lung sounds clear throughout. Musculoskeletal:  Normal range of motion. No edema or lymphadenopathy. Neurological:  Alert and oriented to person, place, and time. Cranial nerve II-XII grossly intact. Skin:  Skin is warm. No erythema. Psychiatric:  Normal mood and affect.  Behavior is normal.      Vitals reviewed.     Data:  All of the past medical history, past surgical history, family history,social history, allergies and current medications were reviewed with the patient. Assessment & Plan   Diagnoses and all orders for this visit:       Diagnosis Orders   1. ETD (Eustachian tube dysfunction), bilateral  ADENOIDECTOMY     Myringotomy Tympanostomy Tube Placement   2. Chronic CELSA (middle ear effusion), bilateral  ADENOIDECTOMY     Myringotomy Tympanostomy Tube Placement   3. Ventilation tube blocked, initial encounter, left         The findings were explained and his questions were answered. Given the persistent bilateral middle ear dysfunction with blocked left ventilation tube and associated conductive hearing loss, recommend placement of T tubes and possible adenoidectomy. Requested that father observe sleep a few hours after Lolis Bruno has fallen asleep for snoring, apneas and mouth breathing; he will report any symptoms to us. Indications, risks and benefits of T tube placement and adenoidectomy were discussed with father in detail; informed consent provided by Dr Anastacio Pina today. Father wishes to proceed. Management of patient's care was collaborated with Dr Anastacio Pina today.        Return for scheduled surgery.        **This report has been created using voice recognition software. It may contain minor errors which are inherent in voice recognition technology. **                                               Office Visit on 11/22/2021           Office Visit on 11/22/2021                Detailed Report            Note shared with patient        Progress Notes Info    Author Note Status Last Update User Last Update Date/Time   JAROD Boateng CNP Signed JAROD Boateng CNP 11/22/2021  4:52 PM     Chart Review Routing History    No routing history on file. Statement Selected

## 2023-08-07 NOTE — OB RN INTRAOPERATIVE NOTE - NSADDITIONALPERSONNEL1_OBGYN_ALL_OB_FT
rashi dunne pgy 1 assist; luz marina [employer] med stud 3rd year rashi dunne pgy 1 assist; luz marina maxwell med stud 3rd year scrubbed in case

## 2023-08-07 NOTE — DISCHARGE NOTE OB - NS MD DC FALL RISK RISK
For information on Fall & Injury Prevention, visit: [email or web address] OR  [email or web address] OR  https://www.cdc.gov/steadi/patient.html

## 2023-08-08 VITALS
HEART RATE: 58 BPM | OXYGEN SATURATION: 96 % | SYSTOLIC BLOOD PRESSURE: 111 MMHG | TEMPERATURE: 98 F | DIASTOLIC BLOOD PRESSURE: 67 MMHG | RESPIRATION RATE: 18 BRPM

## 2023-08-08 LAB
ALBUMIN SERPL ELPH-MCNC: 2.6 G/DL — LOW (ref 3.3–5.2)
ALP SERPL-CCNC: 129 U/L — HIGH (ref 40–120)
ALT FLD-CCNC: 11 U/L — SIGNIFICANT CHANGE UP
ANION GAP SERPL CALC-SCNC: 11 MMOL/L — SIGNIFICANT CHANGE UP (ref 5–17)
AST SERPL-CCNC: 16 U/L — SIGNIFICANT CHANGE UP
BASOPHILS # BLD AUTO: 0.01 K/UL — SIGNIFICANT CHANGE UP (ref 0–0.2)
BASOPHILS NFR BLD AUTO: 0.1 % — SIGNIFICANT CHANGE UP (ref 0–2)
BILIRUB SERPL-MCNC: <0.2 MG/DL — LOW (ref 0.4–2)
BUN SERPL-MCNC: 12.9 MG/DL — SIGNIFICANT CHANGE UP (ref 8–20)
CALCIUM SERPL-MCNC: 8 MG/DL — LOW (ref 8.4–10.5)
CHLORIDE SERPL-SCNC: 105 MMOL/L — SIGNIFICANT CHANGE UP (ref 96–108)
CO2 SERPL-SCNC: 21 MMOL/L — LOW (ref 22–29)
CREAT SERPL-MCNC: 0.71 MG/DL — SIGNIFICANT CHANGE UP (ref 0.5–1.3)
EGFR: 114 ML/MIN/1.73M2 — SIGNIFICANT CHANGE UP
EOSINOPHIL # BLD AUTO: 0.02 K/UL — SIGNIFICANT CHANGE UP (ref 0–0.5)
EOSINOPHIL NFR BLD AUTO: 0.2 % — SIGNIFICANT CHANGE UP (ref 0–6)
GLUCOSE SERPL-MCNC: 88 MG/DL — SIGNIFICANT CHANGE UP (ref 70–99)
HCT VFR BLD CALC: 31.1 % — LOW (ref 34.5–45)
HGB BLD-MCNC: 10.3 G/DL — LOW (ref 11.5–15.5)
IMM GRANULOCYTES NFR BLD AUTO: 0.4 % — SIGNIFICANT CHANGE UP (ref 0–0.9)
LYMPHOCYTES # BLD AUTO: 1.69 K/UL — SIGNIFICANT CHANGE UP (ref 1–3.3)
LYMPHOCYTES # BLD AUTO: 18.6 % — SIGNIFICANT CHANGE UP (ref 13–44)
MCHC RBC-ENTMCNC: 29.2 PG — SIGNIFICANT CHANGE UP (ref 27–34)
MCHC RBC-ENTMCNC: 33.1 GM/DL — SIGNIFICANT CHANGE UP (ref 32–36)
MCV RBC AUTO: 88.1 FL — SIGNIFICANT CHANGE UP (ref 80–100)
MONOCYTES # BLD AUTO: 0.39 K/UL — SIGNIFICANT CHANGE UP (ref 0–0.9)
MONOCYTES NFR BLD AUTO: 4.3 % — SIGNIFICANT CHANGE UP (ref 2–14)
NEUTROPHILS # BLD AUTO: 6.94 K/UL — SIGNIFICANT CHANGE UP (ref 1.8–7.4)
NEUTROPHILS NFR BLD AUTO: 76.4 % — SIGNIFICANT CHANGE UP (ref 43–77)
PLATELET # BLD AUTO: 113 K/UL — LOW (ref 150–400)
POTASSIUM SERPL-MCNC: 4 MMOL/L — SIGNIFICANT CHANGE UP (ref 3.5–5.3)
POTASSIUM SERPL-SCNC: 4 MMOL/L — SIGNIFICANT CHANGE UP (ref 3.5–5.3)
PROT SERPL-MCNC: 4.8 G/DL — LOW (ref 6.6–8.7)
RBC # BLD: 3.53 M/UL — LOW (ref 3.8–5.2)
RBC # FLD: 15.2 % — HIGH (ref 10.3–14.5)
SODIUM SERPL-SCNC: 137 MMOL/L — SIGNIFICANT CHANGE UP (ref 135–145)
WBC # BLD: 9.09 K/UL — SIGNIFICANT CHANGE UP (ref 3.8–10.5)
WBC # FLD AUTO: 9.09 K/UL — SIGNIFICANT CHANGE UP (ref 3.8–10.5)

## 2023-08-08 PROCEDURE — 85730 THROMBOPLASTIN TIME PARTIAL: CPT

## 2023-08-08 PROCEDURE — 85025 COMPLETE CBC W/AUTO DIFF WBC: CPT

## 2023-08-08 PROCEDURE — 85460 HEMOGLOBIN FETAL: CPT

## 2023-08-08 PROCEDURE — 88302 TISSUE EXAM BY PATHOLOGIST: CPT

## 2023-08-08 PROCEDURE — 85027 COMPLETE CBC AUTOMATED: CPT

## 2023-08-08 PROCEDURE — 86901 BLOOD TYPING SEROLOGIC RH(D): CPT

## 2023-08-08 PROCEDURE — 80053 COMPREHEN METABOLIC PANEL: CPT

## 2023-08-08 PROCEDURE — 86900 BLOOD TYPING SEROLOGIC ABO: CPT

## 2023-08-08 PROCEDURE — 86850 RBC ANTIBODY SCREEN: CPT

## 2023-08-08 PROCEDURE — 84156 ASSAY OF PROTEIN URINE: CPT

## 2023-08-08 PROCEDURE — 81003 URINALYSIS AUTO W/O SCOPE: CPT

## 2023-08-08 PROCEDURE — 85384 FIBRINOGEN ACTIVITY: CPT

## 2023-08-08 PROCEDURE — 82570 ASSAY OF URINE CREATININE: CPT

## 2023-08-08 PROCEDURE — 83615 LACTATE (LD) (LDH) ENZYME: CPT

## 2023-08-08 PROCEDURE — 85461 HEMOGLOBIN FETAL: CPT

## 2023-08-08 PROCEDURE — 85610 PROTHROMBIN TIME: CPT

## 2023-08-08 PROCEDURE — 84550 ASSAY OF BLOOD/URIC ACID: CPT

## 2023-08-08 PROCEDURE — 86780 TREPONEMA PALLIDUM: CPT

## 2023-08-08 PROCEDURE — 36415 COLL VENOUS BLD VENIPUNCTURE: CPT

## 2023-08-08 RX ORDER — ACETAMINOPHEN 500 MG
3 TABLET ORAL
Qty: 0 | Refills: 0 | DISCHARGE
Start: 2023-08-08

## 2023-08-08 RX ORDER — IBUPROFEN 200 MG
1 TABLET ORAL
Qty: 0 | Refills: 0 | DISCHARGE
Start: 2023-08-08

## 2023-08-08 RX ORDER — IBUPROFEN 200 MG
3 TABLET ORAL
Qty: 63 | Refills: 0
Start: 2023-08-08 | End: 2023-08-14

## 2023-08-08 RX ORDER — ACETAMINOPHEN 500 MG
3 TABLET ORAL
Qty: 84 | Refills: 0
Start: 2023-08-08 | End: 2023-08-14

## 2023-08-08 RX ADMIN — Medication 975 MILLIGRAM(S): at 15:26

## 2023-08-08 RX ADMIN — Medication 600 MILLIGRAM(S): at 12:38

## 2023-08-08 RX ADMIN — Medication 975 MILLIGRAM(S): at 08:44

## 2023-08-08 RX ADMIN — Medication 975 MILLIGRAM(S): at 15:56

## 2023-08-08 RX ADMIN — OXYCODONE HYDROCHLORIDE 5 MILLIGRAM(S): 5 TABLET ORAL at 02:21

## 2023-08-08 RX ADMIN — OXYCODONE HYDROCHLORIDE 5 MILLIGRAM(S): 5 TABLET ORAL at 03:18

## 2023-08-08 RX ADMIN — Medication 975 MILLIGRAM(S): at 02:21

## 2023-08-08 RX ADMIN — Medication 1 TABLET(S): at 12:38

## 2023-08-08 RX ADMIN — Medication 975 MILLIGRAM(S): at 09:14

## 2023-08-08 RX ADMIN — Medication 600 MILLIGRAM(S): at 13:08

## 2023-08-08 RX ADMIN — Medication 600 MILLIGRAM(S): at 05:35

## 2023-08-08 NOTE — PROGRESS NOTE ADULT - ASSESSMENT
ASSESSMENT:   HUONG VALENTIN is a 32y  PPD2 s/p , POD1 s/p BTL. Patient has no complaints at this time, is otherwise clinically and hemodynamically stable, feels ready to go home.   boy is with patient at bedside. Patient reports she does not desire circumcision.     Hgb: 12.9 > 11.3 > 11.1  Rub: I/I  Rh neg - KB 0.0, to receive Rhogam  #gHTN  - BPs wnl     #Postpartum  - Continue routine post-partum care  - Pain management PRN  - Encourage maternal- bonding    - Diet: Regular, advance as tolerated  - DVT ppx: Ambulation encouraged  - Dispo: Home PPD2 per attending's approval    ASSESSMENT:   HUONG VALENTIN is a 32y  PPD2 s/p , POD1 s/p BTL. Patient has no complaints at this time, is otherwise clinically and hemodynamically stable, feels ready to go home.   boy is with patient at bedside. Patient reports she does not desire circumcision.     Hgb: 12.9 > 11.3 > 11.1  Rub: I/I  Rh neg - KB 0.0, to receive Rhogam    #gHTN  - BPs wnl     #gestational thrombocytopenia  - Asymptomatic- no acute interventions    #Postpartum  - Continue routine post-partum care  - Pain management PRN  - Encourage maternal- bonding    - Diet: Regular, advance as tolerated  - DVT ppx: Ambulation encouraged  - Dispo: Home PPD2 per attending's approval

## 2023-08-08 NOTE — PROGRESS NOTE ADULT - ATTENDING COMMENTS
resident note reviewed  patient doing well without complaints   VS and labs reviewed  ppd#2 s/p  and pod#1 s/p pp tubal - stable, afebrile   cleared for discharge to home

## 2023-08-08 NOTE — PROGRESS NOTE ADULT - SUBJECTIVE AND OBJECTIVE BOX
32y Female s/p Bilateral Salpinjectomy under spinal anesthesia on 08/07/2023    Vital Signs    T(C): 36.7 (08 Aug 2023 08:00), Max: 36.9 (07 Aug 2023 15:34)  T(F): 98 (08 Aug 2023 08:00), Max: 98.4 (07 Aug 2023 15:34)  HR: 54 (08 Aug 2023 08:00) (46 - 87)  BP: 100/66 (08 Aug 2023 08:00) (100/66 - 130/74)  BP(mean): --  RR: 18 (08 Aug 2023 08:00) (16 - 18)  SpO2: 95% (08 Aug 2023 08:00) (90% - 99%)    Parameters below as of 08 Aug 2023 08:00    Patient On (Oxygen Delivery Method): room air            Patient's overall anesthesia satisfaction: Positive    Patients pain is well controlled    No pruritis at this time    Patient seen and doing well     No headache      No residual numbness or weakness, sensory and motor function intact    No anesthetic complications or complaints noted or reported          .            
SUBJECTIVE:  Patient seen and examined at bedside this morning. No acute events overnight. Reports feeling well this morning, pain is well controlled with PRN pain medication. Tolerating PO without N/V, voiding spontaneously with no complaints. Patient has been NPO since midnight bc she is for BTL today. Denies fevers, chills, shortness of breath, chest pain, headches, vision changes or calf pain.     OBJECTIVE:  Vital Signs Last 24 Hrs  T(C): 37.4 (07 Aug 2023 05:00), Max: 38.7 (06 Aug 2023 23:36)  T(F): 99.3 (07 Aug 2023 05:00), Max: 101.6 (06 Aug 2023 23:36)  HR: 84 (07 Aug 2023 05:00) (84 - 126)  BP: 108/62 (07 Aug 2023 05:00) (105/66 - 195/78)    Physical exam:  General: AOx3, NAD.  Heart: S1/S2 with regular rate and normal rhythm.  Lungs: CTABL, no crackles or wheezing.   Abdomen: +BS, Soft, appropriately tender, nondistended, no guarding or rebound tenderness, firm uterine fundus at umbilicus  Ext: BL LE reveal minimal swelling, no popliteal tenderness or skin changes.     LABS:                        11.3   10.29 )-----------( 106      ( 07 Aug 2023 03:41 )             34.1     Urinalysis Basic - ( 07 Aug 2023 03:41 )    Color: x / Appearance: x / SG: x / pH: x  Gluc: 98 mg/dL / Ketone: x  / Bili: x / Urobili: x   Blood: x / Protein: x / Nitrite: x   Leuk Esterase: x / RBC: x / WBC x   Sq Epi: x / Non Sq Epi: x / Bacteria: x      Antibody Screen: NEG (08-06-23 @ 08:40)  Antibody Screen: NEG (08-03-23 @ 16:45)
SUBJECTIVE:  Patient seen and examined at bedside this morning. No acute events overnight. Reports feeling well this morning, pain is well controlled with PRN pain medication. Tolerating PO without N/V, voiding spontaneously. Patient is s/p BTL, no complaints. Denies fevers, chills, shortness of breath, chest pain, headaches, vision changes or calf pain.     OBJECTIVE:  Vital Signs Last 24 Hrs  T(C): 36.7 (08 Aug 2023 04:00), Max: 37.4 (07 Aug 2023 05:00)  T(F): 98 (08 Aug 2023 04:00), Max: 99.3 (07 Aug 2023 05:00)  HR: 52 (08 Aug 2023 04:00) (46 - 87)  BP: 109/65 (08 Aug 2023 04:00) (104/57 - 130/74)    Physical exam:  General: AOx3, NAD.  Heart: S1/S2 with regular rate and normal rhythm.  Lungs: CTABL, no crackles or wheezing.   Abdomen: +BS, Soft, appropriately tender, nondistended, no guarding or rebound tenderness, firm uterine fundus at umbilicus  Ext: BL LE reveal minimal swelling, no popliteal tenderness or skin changes.     LABS:                        11.3   10.29 )-----------( 106      ( 07 Aug 2023 03:41 )             34.1     Urinalysis Basic - ( 07 Aug 2023 03:41 )    Color: x / Appearance: x / SG: x / pH: x  Gluc: 98 mg/dL / Ketone: x  / Bili: x / Urobili: x   Blood: x / Protein: x / Nitrite: x   Leuk Esterase: x / RBC: x / WBC x   Sq Epi: x / Non Sq Epi: x / Bacteria: x      Antibody Screen: NEG (08-06-23 @ 08:40)  Antibody Screen: NEG (08-03-23 @ 16:45)
INTERVAL HPI/OVERNIGHT EVENTS:  32y Female s/p labor epidural on 8/6/23    Vital Signs Last 24 Hrs  T(C): 37.4 (07 Aug 2023 05:00), Max: 38.7 (06 Aug 2023 23:36)  T(F): 99.3 (07 Aug 2023 05:00), Max: 101.6 (06 Aug 2023 23:36)  HR: 84 (07 Aug 2023 05:00) (84 - 126)  BP: 108/62 (07 Aug 2023 05:00) (108/62 - 195/78)  BP(mean): --  RR: 22 (06 Aug 2023 22:59) (14 - 22)  SpO2: 94% (07 Aug 2023 05:00) (94% - 98%)    Parameters below as of 07 Aug 2023 05:00  Patient On (Oxygen Delivery Method): room air            Patient's overall anesthesia satisfaction: Positive    Patient doing well     No headache      No residual numbness or weakness, sensory and motor function intact    No anesthetic complications or complaints noted or reported

## 2023-08-09 ENCOUNTER — NON-APPOINTMENT (OUTPATIENT)
Age: 33
End: 2023-08-09

## 2023-08-09 DIAGNOSIS — O13.9 GESTATIONAL [PREGNANCY-INDUCED] HYPERTENSION W/OUT SIGNIFICANT PROTEINURIA, UNSPECIFIED TRIMESTER: ICD-10-CM

## 2023-08-09 RX ORDER — ONDANSETRON 4 MG/1
4 TABLET ORAL
Qty: 3 | Refills: 2 | Status: DISCONTINUED | COMMUNITY
Start: 2023-02-02 | End: 2023-08-09

## 2023-08-09 RX ORDER — ASPIRIN 81 MG/1
81 TABLET ORAL DAILY
Qty: 2 | Refills: 2 | Status: DISCONTINUED | COMMUNITY
Start: 2023-02-02 | End: 2023-08-09

## 2023-08-09 RX ORDER — TERCONAZOLE 4 MG/G
0.4 CREAM VAGINAL DAILY
Qty: 1 | Refills: 3 | Status: DISCONTINUED | COMMUNITY
Start: 2023-03-06 | End: 2023-08-09

## 2023-08-10 ENCOUNTER — APPOINTMENT (OUTPATIENT)
Dept: CARE COORDINATION | Facility: HOME HEALTH | Age: 33
End: 2023-08-10
Payer: MEDICAID

## 2023-08-10 DIAGNOSIS — E66.01 MORBID (SEVERE) OBESITY DUE TO EXCESS CALORIES: ICD-10-CM

## 2023-08-10 DIAGNOSIS — J45.20 MILD INTERMITTENT ASTHMA, UNCOMPLICATED: ICD-10-CM

## 2023-08-10 PROCEDURE — 96127 BRIEF EMOTIONAL/BEHAV ASSMT: CPT | Mod: 95

## 2023-08-10 PROCEDURE — 99349 HOME/RES VST EST MOD MDM 40: CPT | Mod: 95,24

## 2023-08-10 RX ORDER — ALBUTEROL SULFATE 90 UG/1
108 (90 BASE) INHALANT RESPIRATORY (INHALATION)
Qty: 1 | Refills: 0 | Status: ACTIVE | COMMUNITY
Start: 2023-08-10

## 2023-08-10 RX ORDER — ACETAMINOPHEN 325 MG/1
325 TABLET ORAL
Refills: 0 | Status: ACTIVE | COMMUNITY
Start: 2023-08-10

## 2023-08-10 RX ORDER — IBUPROFEN 600 MG/1
600 TABLET, FILM COATED ORAL EVERY 6 HOURS
Qty: 56 | Refills: 0 | Status: ACTIVE | COMMUNITY
Start: 2023-08-10

## 2023-08-10 RX ORDER — BLOOD PRESSURE TEST KIT-MEDIUM
KIT MISCELLANEOUS
Qty: 1 | Refills: 0 | Status: ACTIVE | COMMUNITY
Start: 2023-08-10 | End: 1900-01-01

## 2023-08-11 NOTE — CHART NOTE - NSCHARTNOTEFT_GEN_A_CORE
Food As Health Program Note:    Initial Screening    Date of Initial Screenin23    Patient qualifies for Food As Health Program  [ x ] Patient qualifies for Food As Health Program w/ health condition  [  ] Patient does not qualify for Food As Health Program w/ no health conditions    Diagnosis that qualifies patient for program  [  ] Hypertension  [  ] Diabetes  [  ] Unintended weight loss  [  ] Obesity  [  ] CHF  [ x ] Other    Additional Comments: postpartum          Current Patient Diet: Regular Diet      Actions Taken:  [  ] Spoke with patient  [ x ] [employer] survey completed  Additional Comments: Provided with community resources through [employer], list of local food pantries, and [employer] Information application.          Non-qualification for Food As Health Program  [   ] D/C to [facility]  [   ] Referred to Social Work  [  ] Declined Food As Health Program  [   ] D/C Before Seen By RD  Participant Phone Number:  NEGRO Comments:              Discharge Visit  [  ] Initial Screening already completed    Date of D/C:  [  ]  Patient provided with healthy groceries  [  ] Follow Up appointment made  [ x ] Other community outreach given  [ x ] Help with SNAP application at F/U appointment  [ x ] Patient D/C while RD was unavailable. Will call to schedule pick-up

## 2023-08-11 NOTE — OB PROVIDER DELIVERY SUMMARY - AMNIOTIC FLUID AMOUNT, LABOR
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  Item Serial No.  Lot No. LRB No. Used Action   GRAFT BNE INJ 3 CC AUG - SNA  GRAFT BNE INJ 3 CC AUG NA [facility] [id number] Left 1 Implanted   GRAFT BNE INJ 3 CC AUG - SNA  GRAFT BNE INJ 3 CC AUG NA [facility] [id number] Left 1 Implanted   NAIL IM SM [id number] [id number] 120DEG L LAT HINDFOOT TI GEENA - SNA  NAIL IM SM L150MM [id number] 120DEG L LAT HINDFOOT TI GEENA  [facility] [id number] Left 1 Implanted   SCREW BNE L30MM DIA5MM TI FOR HINDFOOT FUS SYS VALOR - SNA  SCREW BNE L30MM DIA5MM TI FOR HINDFOOT FUS SYS VALOR NA [facility] [id number] Left 1 Implanted   SCREW BNE L35MM DIA5MM TI FOR HINDFOOT FUS SYS VALOR - SNA  SCREW BNE L35MM DIA5MM TI FOR HINDFOOT FUS SYS VALOR  [facility] [id number] Left 1 Implanted   SCREW BNE L45MM DIA5MM TI FOR HINDFOOT FUS SYS VALOR - SNA  SCREW BNE L45MM DIA5MM TI FOR HINDFOOT FUS SYS VALOR NA [facility] [id number] Left 1 Implanted   GRAFT HUM TISS CHORION BASE [facility] 8X4 CM  ESHA [facility] [id number]  GRAFT HUM TISS CHORION BASE THCK 8X4 CM AMNIO ACTISHIELD within normal limits

## 2023-08-14 ENCOUNTER — NON-APPOINTMENT (OUTPATIENT)
Age: 33
End: 2023-08-14

## 2023-08-16 ENCOUNTER — APPOINTMENT (OUTPATIENT)
Dept: OBGYN | Facility: CLINIC | Age: 33
End: 2023-08-16
Payer: MEDICAID

## 2023-08-16 VITALS — WEIGHT: 210 LBS | SYSTOLIC BLOOD PRESSURE: 120 MMHG | DIASTOLIC BLOOD PRESSURE: 78 MMHG | BODY MASS INDEX: 38.41 KG/M2

## 2023-08-16 DIAGNOSIS — Z48.89 ENCOUNTER FOR OTHER SPECIFIED SURGICAL AFTERCARE: ICD-10-CM

## 2023-08-16 DIAGNOSIS — Z01.30 ENCOUNTER FOR EXAMINATION OF BLOOD PRESSURE W/OUT ABNORMAL FINDINGS: ICD-10-CM

## 2023-08-16 PROCEDURE — 99213 OFFICE O/P EST LOW 20 MIN: CPT

## 2023-08-16 NOTE — PLAN
[FreeTextEntry1] : PLAN  incision healing well, dressing removed  BP normal not on HTN meds  RTO 5 weeks for 6 week postpartum visit

## 2023-08-16 NOTE — PHYSICAL EXAM
[Appropriately responsive] : appropriately responsive [Alert] : alert [No Acute Distress] : no acute distress [Soft] : soft [Non-tender] : non-tender [Non-distended] : non-distended [No HSM] : No HSM [No Lesions] : no lesions [No Mass] : no mass [Oriented x3] : oriented x3 [FreeTextEntry7] : incision healing well, dressing removed

## 2023-08-16 NOTE — HISTORY OF PRESENT ILLNESS
[FreeTextEntry1] : 31yo presents for a BP check secondary to preeclampsia in recent  on 2023. Patient was discharged without HTN medications and has appointment with cardiology for follow up. Patient is also here for incision check for BTL done 23. Denies any pain, fever or pus coming from site   Denies any headaches, vision changes and epigastric pain  BP normal 120/78

## 2023-08-22 ENCOUNTER — NON-APPOINTMENT (OUTPATIENT)
Age: 33
End: 2023-08-22

## 2023-08-28 LAB — SURGICAL PATHOLOGY STUDY: SIGNIFICANT CHANGE UP

## 2023-08-30 ENCOUNTER — NON-APPOINTMENT (OUTPATIENT)
Age: 33
End: 2023-08-30

## 2023-09-01 ENCOUNTER — APPOINTMENT (OUTPATIENT)
Dept: CARDIOLOGY | Facility: CLINIC | Age: 33
End: 2023-09-01

## 2023-09-04 ENCOUNTER — NON-APPOINTMENT (OUTPATIENT)
Age: 33
End: 2023-09-04

## 2023-09-13 DIAGNOSIS — Z34.92 ENCOUNTER FOR SUPERVISION OF NORMAL PREGNANCY, UNSPECIFIED, SECOND TRIMESTER: ICD-10-CM

## 2023-09-15 ENCOUNTER — APPOINTMENT (OUTPATIENT)
Dept: OBGYN | Facility: CLINIC | Age: 33
End: 2023-09-15
Payer: MEDICAID

## 2023-09-15 VITALS
HEIGHT: 62 IN | BODY MASS INDEX: 38.28 KG/M2 | WEIGHT: 208 LBS | SYSTOLIC BLOOD PRESSURE: 118 MMHG | DIASTOLIC BLOOD PRESSURE: 74 MMHG

## 2023-09-15 PROCEDURE — 0503F POSTPARTUM CARE VISIT: CPT

## 2023-09-15 RX ORDER — IBUPROFEN 600 MG/1
600 TABLET, FILM COATED ORAL 3 TIMES DAILY
Qty: 90 | Refills: 2 | Status: ACTIVE | COMMUNITY
Start: 2023-09-15 | End: 1900-01-01

## 2023-09-22 LAB — CYTOLOGY CVX/VAG DOC THIN PREP: NORMAL

## 2023-09-25 NOTE — DISCHARGE NOTE OB - SEVERE ABDOMINAL/VAGINAL AND/OR RECTAL PAIN
46 y/o F PMH ovarian cysts  currently approx 10 weeks pregnant presenting to ED for abd pain. urinary frequency x 1 week. Pt states her LMP was , was having hot flashes and thought she was beginning menopause. Took a pregnancy test on  and noted a "faint positive line." Was unable to make appt with outpatient PMD prompting ED arrival. Also endorsing yellow vaginal discharge but states this is not uncommon for her. No abnormal vaginal bleeding. Denies CP, SOB, fevers, n/v/d.
Statement Selected

## 2023-10-02 LAB
BILIRUB UR QL STRIP: NORMAL
CLARITY UR: CLEAR
COLLECTION METHOD: NORMAL
GLUCOSE UR-MCNC: NORMAL
HCG UR QL: 0.2 EU/DL
HGB UR QL STRIP.AUTO: NORMAL
KETONES UR-MCNC: NORMAL
LEUKOCYTE ESTERASE UR QL STRIP: NORMAL
NITRITE UR QL STRIP: NORMAL
PH UR STRIP: 5.5
PROT UR STRIP-MCNC: NORMAL
SP GR UR STRIP: 1

## 2023-10-16 NOTE — OB RN PATIENT PROFILE - PRO INTERPRETER NEED 2
Tazorac Counseling:  Patient advised that medication is irritating and drying.  Patient may need to apply sparingly and wash off after an hour before eventually leaving it on overnight.  The patient verbalized understanding of the proper use and possible adverse effects of tazorac.  All of the patient's questions and concerns were addressed. [place]

## 2023-10-28 NOTE — OB RN DELIVERY SUMMARY - BABY A: WEIGHT (GM) DELIVERY
Occupational Therapy Evaluation     Patient Name: Lambert Weiss  UWWLH'J Date: 10/28/2023  Problem List  Active Problems:    Hypothyroid    Chronic pain    Fall    Compartment syndrome ([address])    Type 2 diabetes mellitus ([address])    Past Medical History  Past Medical History:   Diagnosis Date    Ambulates with cane     Anxiety     Arthritis     Asthma     Bipolar 1 disorder ([address])     Brain aneurysm     Chronic pain disorder     spinal stenosis    Concussion syndrome     neurological rx and balance rx    COPD (chronic obstructive pulmonary disease) ([facility])     Depression     Diabetes mellitus ([address])     controlled w/ diet    Disease of thyroid gland     nodules     Family history of colon cancer     father    Fibromyalgia, primary     GERD (gastroesophageal reflux disease)     History of colon polyps     Hyperlipidemia     Hypertension     Infection as cause of inflammation of optic nerve     Irritable bowel syndrome     Lumbar degenerative disc disease 02/16/2022    Migraine     Neuropathy     bilateral feet and hands    Peripheral neuropathy     Psychiatric disorder     PTSD (post-traumatic stress disorder)     Shortness of breath     Sleep apnea     had 3 studies & last one negative    Stroke ([address])     2012 no deficeits    TIA (transient ischemic attack)     Wears dentures     Wears glasses      Past Surgical History  Past Surgical History:   Procedure Laterality Date    ABDOMINAL SURGERY      laproscopic/ endometriosis    BRAIN SURGERY      aneurysm/ coiling procedure    CARPAL TUNNEL RELEASE      CHOLECYSTECTOMY      COLONOSCOPY      DENTAL SURGERY      EPIDURAL BLOCK INJECTION Right 03/03/2022    Procedure: C7-T1 interlaminar epidural steroid injection;  Surgeon: Dl Irving MD;  Location: [facility];  Service: Pain Management     EPIDURAL BLOCK INJECTION N/A 04/21/2022    Procedure: C6-C7 BLOCK / INJECTION EPIDURAL STEROID CERVICAL;  Surgeon: Dl Irving MD;  Location: [facility];  Service: Pain Management     EPIDURAL BLOCK INJECTION Left 06/21/2022    Procedure: BLOCK / INJECTION EPIDURAL STEROID LUMBAR  left L3-4 TFESI;  Surgeon: Shirley Rico MD;  Location: [facility];  Service: Pain Management     EYE SURGERY      cataract    FL GUIDED NEEDLE PLAC BX/ASP/INJ  03/03/2022    FL GUIDED NEEDLE PLAC BX/ASP/INJ  11/17/2022    HYSTERECTOMY      VT ESOPHAGOGASTRODUODENOSCOPY TRANSORAL DIAGNOSTIC N/A 02/08/2018    Procedure: EGD AND COLONOSCOPY;  Surgeon: Yusra Martines MD;  Location: [facility]; Service: Gastroenterology    VT PRQ [address] NSTIM ELECTRODE ARRAY EPIDURAL N/A 11/17/2022    Procedure: Mickiel Ghazal SCS TRIAL;  Surgeon: Shirley Rico MD;  Location: [facility];  Service: Pain Management     VT PRQ IMPLTJ NSTIM ELECTRODE ARRAY EPIDURAL N/A 2/1/2023    Procedure: Insertion percutaneous thoracic spinal cord stimulator with left buttock implantable pulse generator;  Surgeon: Pritesh Arreaga MD;  Location:  MAIN OR;  Service: Neurosurgery    THYROID SURGERY      TONSILLECTOMY      US GUIDED THYROID BIOPSY  11/30/2016    US GUIDED THYROID BIOPSY  3/21/2018           10/28/23 1025   OT Last Visit   OT Visit Date 10/28/23   Note Type   Note type Evaluation   Pain Assessment   Pain Assessment Tool 0-10   Pain Score 10 - Worst Possible Pain   Pain Location/Orientation Location: Generalized  (head, RUE, back, and B/L feet)   Hospital Pain Intervention(s) Repositioned; Ambulation/increased activity   Restrictions/Precautions   Weight Bearing Precautions Per Order Yes   RUE Weight Bearing Per Order WBAT   Other Precautions Bed Alarm; Chair Alarm;Multiple lines; Fall Risk;Pain  (RUE wound vac)   Home Living   Type of Home Apartment   Home Layout One level  (2STE)   Bathroom Shower/Tub Tub/shower unit   Bathroom Toilet Standard   Bathroom Equipment Shower chair   Home Equipment Walker;Cane  (was using SPC PTA)   Prior Function   Level of [place] Independent with ADLs; Independent with IADLS Lives With Alone   Receives Help From Home health   IADLs Independent with driving;Family/Friend/Other provides meals   Falls in the last 6 months (S)  5 to 10  (6 per pt report, 1 leading to current admission)   Vocational On disability   Lifestyle   Autonomy PTA, pt reports needing A with ADLs/IADLs, SPC for fnxl mobility, (+)    Reciprocal Relationships HHA 40 hrs/wk, meals on wheels, supportive family   Service to Others On disability   Intrinsic Gratification Singing, spending time with her 2 twin cats   Subjective   Subjective "Don't touch my arm or my feet"   ADL   Where Assessed Edge of bed   Eating Assistance 3  Moderate Assistance   Grooming Assistance 3  Moderate Assistance   UB Bathing Assistance 2  Maximal Assistance   LB Bathing Assistance 1  Total Assistance   UB Dressing Assistance 2  Maximal Assistance   LB Dressing Assistance 1  Total Assistance   Toileting Assistance  2  Maximal Assistance   Bed Mobility   Supine to Sit 3  Moderate assistance   Additional items HOB elevated; Bedrails; Increased time required;LE management;Assist x 2   Sit to Supine Unable to assess   Transfers   Sit to Stand 3  Moderate assistance   Additional items Assist x 2; Increased time required   Stand to Sit 3  Moderate assistance   Additional items Assist x 2; Increased time required   Additional Comments Requires support of RUE on pillow - unable to support arm by itself 2* pain/sensitivity   Functional Mobility   Functional Mobility 3  Moderate assistance   Additional Comments Ax2 bed to chair   Additional items Rolling walker   Balance   Static Sitting Fair   Dynamic Sitting Fair -   Static Standing Poor   Dynamic Standing Poor   Ambulatory Poor   Activity Tolerance   Activity Tolerance Patient limited by pain   Medical Staff Made Aware PT Francisco Javier   Nurse Made Aware RN clearance for session   RUE Assessment   RUE Assessment X  (unable to perform distal ROM, limited proximally.  Deferred PROM/strength testing 2* pain and sensitivity)   LUE Assessment   LUE Assessment WFL   Cognition   Arousal/Participation Responsive; Cooperative   Attention Attends with cues to redirect   Orientation Level Oriented X4   Memory Unable to assess   Following Commands Follows one step commands with increased time or repetition   Comments Pt pleasant and cooperative during session, anxious at times   Assessment   Limitation Decreased ADL status; Decreased UE ROM; Decreased UE strength;Decreased Safe judgement during ADL;Decreased cognition;Decreased endurance;Decreased sensation;Decreased fine motor control;Decreased high-level ADLs; Decreased self-care trans   Prognosis Fair   Assessment Pt is a 62 y.o. female admitted to \[facility]\"" on 10/27/2023 w/ fall + RUE compartment syndrome. Pt now s/p RUE fasciotomy and wound vac placement. has a past medical history of Anxiety, Arthritis, Asthma, Bipolar 1 disorder, Brain aneurysm, Chronic pain disorder, Concussion syndrome, COPD, Depression, Diabetes mellitus, Disease of thyroid gland, Fibromyalgia, primary, GERD, History of colon polyps, Hyperlipidemia, Hypertension, Infection as cause of inflammation of optic nerve, Irritable bowel syndrome, Lumbar DDD, Migraine, Neuropathy, Peripheral neuropathy, Psychiatric disorder, PTSD, SOB, Sleep apnea, Stroke, and TIA. Pt with active OT orders and  up in chair  orders. Pt seen as a co-evaluation with PT due to the patient's co-morbidities, clinically unstable presentation/clinical complexity, and present impairments. As per pt report, pta, resides alone in a 1STA, 2STE. Pt required A w/  ADLS and IADLS, (+) drove. Upon evaluation, pt currently requires MOD A x 2 for transfers and mobility. Essentially non-functional RUE at this time 2* poor ROM and increased sensitivity/pain. Pt currently requires MOD A eating, MOD A grooming, MAX A UB ADLs, DEP LB ADLs, and MAX A toileting.  Pt is limited at this time 2*: pain, endurance, activity tolerance, functional mobility, balance, functional standing tolerance, decreased I w/ ADLS/IADLS, strength, ROM, and impaired fine motor skills. The following Occupational Performance Areas to address include: eating, grooming, bathing/shower, toilet hygiene, dressing, health maintenance, functional mobility, community mobility, and clothing management. Pt to benefit from immediate acute skilled OT to address above deficits, improve overall functional independence, maximize fnxl mobility and reduce caregiver burden. From OT standpoint, recommendation at time of d/c would be STR. Pt was left in chair after session with alarm on and all current needs met. Folded wash cloths placed under R wrist in chair to promote proper positioning of wrist and hand. The patient's raw score on the AM-PAC Daily Activity Inpatient Short Form is 11. A raw score of less than 19 suggests the patient may benefit from discharge to post-acute rehabilitation services. Please refer to the recommendation of the Occupational Therapist for safe discharge planning. Goals   Patient Goals to decrease her pain   LTG Time Frame 10-14   Plan   Treatment Interventions ADL retraining;Functional transfer training;UE strengthening/ROM; Endurance training;Cognitive reorientation;Patient/family training;Equipment evaluation/education; Compensatory technique education; Fine motor coordination activities;Continued evaluation; Activityengagement; Energy conservation   Goal Expiration Date 11/11/23   OT Frequency 2-3x/wk   Discharge Recommendation   OT Discharge Recommendation Post acute rehabilitation services   [facility] Daily Activity Inpatient   Lower Body Dressing 1   Bathing 2   Toileting 2   Upper Body Dressing 2   Grooming 2   Eating 2   Daily Activity Raw Score 11   Daily Activity Standardized Score (Calc for Raw Score >=11) 29.04   AM-[facility] Applied Cognition Inpatient   Following a Speech/Presentation 3   Understanding Ordinary Conversation 4   Taking Medications 4   Remembering Where Things Are Placed or Put Away 4   Remembering List of 4-5 Errands 4   Taking Care of Complicated Tasks 3   Applied Cognition Raw Score 22   Applied Cognition Standardized Score 47.83       GOALS    - Pt will complete UB dressing/self care/bathing w/ MIN A using adaptive device and DME as needed    - Pt will complete LB dressing/self care/bathing w/ MIN A using adaptive device and DME as needed    - Pt will complete toileting w/ MIN A w/ G hygiene/thoroughness using DME as needed    - Pt will improve functional transfers to S on/off all surfaces using DME as needed w/ G balance/safety     - Pt will improve functional mobility during ADL/IADL/leisure tasks to S using DME as needed w/ G balance/safety     - Pt will demonstrate G carryover of pt/caregiver education and training as appropriate.     - Pt will demonstrate 100% carryover of energy conservation techniques t/o functional I/ADL/leisure tasks w/o cues s/p skilled education    - Pt will engage in ongoing cognitive assessment w/ G participation to assist w/ safe d/c planning/recommendations    - Pt will increase static and dynamic standing/sitting balance to G using AD/DME as needed to increase safety and I during fnxl transfers and ADLs    - Pt will maintain upright sitting position for at least 20 min during dynamic fnxl activity with G balance and endurance to improve ADL performance and independence, as well as reduce risk of falls         Madison Real MS, OTR/L [id number]

## 2024-02-28 NOTE — ED PROCEDURE NOTE - PROCEDURE DATE TIME, MLM
15-Feb-2021 16:37 [de-identified] : 1/25/24: NSR 77 bpm. Non-specific ST changes in lateral leads.  2/22/24 NSR at 71bpm

## 2024-03-29 NOTE — OB RN PATIENT PROFILE - NSPROMEDSBROUGHTTOHOSP_GEN_A_NUR
patient called to confirm her cholesterol results. patient wanted to know which cholesterol is high the good or bad. I indicated that it was LDL - bad cholesterol high and for patient to continue taking her medication.    no

## 2024-07-31 NOTE — ED ADULT TRIAGE NOTE - SPO2 (%)
Keep your intake of vitamin K regular. The highest amount of vitamin K is found in green and leafy vegetables like broccoli, lettuces, cabbage, and spinach. You can eat these foods but keep the portion size the same. Changes in the amount you eat can affect your PT/INR blood test. Contact your doctor before making any major changes in your diet. Limit your alcohol intake.
96

## 2024-08-23 NOTE — DISCHARGE NOTE OB - FUNCTIONAL SCREEN CURRENT LEVEL: DRESSING, MLM
[FreeTextEntry1] : Pt's BP still running high. She is amenable to starting the Amlod 2.5. Will monitor at home, bring in monitor for RN appt and BP check/accuracy. check routine labs Mammo/US /Dexa Ortho opinion/MRI review  Tdap at pharm  Vag Estrogen tabs sent in for GSM.  0 = independent

## 2024-11-22 NOTE — OB PROVIDER H&P - NSINFECTIONS_OBGYN_ALL_OB
Bedside and Verbal shift change report given to ELAINE Haro (oncoming nurse) by Ольга STONER RN (offgoing nurse). Report included the following information ED Encounter Summary, ED SBAR, and Recent Results.     None

## 2025-01-27 NOTE — OB PROVIDER DELIVERY SUMMARY - NSATTEMPTEDVBAC_OBGYN_ALL_OB
You were seen in the ER today for right-sided flank pain which resolved with medications.  Your laboratory evaluation overall was reassuring and your imaging did not reveal any evidence of kidney stone.  We recommend Tylenol, muscle relaxer as needed if pain returns.  Return to the ER at any time if you have worsening symptoms or other acute concerns.  Otherwise, please follow-up with your primary care physician   No

## 2025-02-19 DIAGNOSIS — Z01.419 ENCOUNTER FOR GYNECOLOGICAL EXAMINATION (GENERAL) (ROUTINE) W/OUT ABNORMAL FINDINGS: ICD-10-CM

## 2025-03-03 ENCOUNTER — APPOINTMENT (OUTPATIENT)
Dept: OBGYN | Facility: CLINIC | Age: 35
End: 2025-03-03
Payer: MEDICAID

## 2025-03-03 VITALS
WEIGHT: 212 LBS | BODY MASS INDEX: 39.01 KG/M2 | SYSTOLIC BLOOD PRESSURE: 122 MMHG | DIASTOLIC BLOOD PRESSURE: 78 MMHG | HEIGHT: 62 IN

## 2025-03-03 DIAGNOSIS — R35.89 OTHER POLYURIA: ICD-10-CM

## 2025-03-03 DIAGNOSIS — D25.9 LEIOMYOMA OF UTERUS, UNSPECIFIED: ICD-10-CM

## 2025-03-03 DIAGNOSIS — E66.01 MORBID (SEVERE) OBESITY DUE TO EXCESS CALORIES: ICD-10-CM

## 2025-03-03 DIAGNOSIS — N94.6 DYSMENORRHEA, UNSPECIFIED: ICD-10-CM

## 2025-03-03 DIAGNOSIS — Z01.419 ENCOUNTER FOR GYNECOLOGICAL EXAMINATION (GENERAL) (ROUTINE) W/OUT ABNORMAL FINDINGS: ICD-10-CM

## 2025-03-03 PROCEDURE — 99214 OFFICE O/P EST MOD 30 MIN: CPT | Mod: 25

## 2025-03-03 PROCEDURE — 99459 PELVIC EXAMINATION: CPT

## 2025-03-03 PROCEDURE — 99395 PREV VISIT EST AGE 18-39: CPT

## 2025-03-06 LAB — CYTOLOGY CVX/VAG DOC THIN PREP: NORMAL

## 2025-03-31 ENCOUNTER — APPOINTMENT (OUTPATIENT)
Dept: UROGYNECOLOGY | Facility: CLINIC | Age: 35
End: 2025-03-31

## 2025-03-31 DIAGNOSIS — R35.89 OTHER POLYURIA: ICD-10-CM

## 2025-05-01 NOTE — OB RN PATIENT PROFILE - NS PRO PT RIGHT SUPPORT PERSON
Established Patient    Patient Care Team:  Michael Olsen M.D. as PCP - General (Family Medicine)  Michael Olsen M.D. (Family Medicine)    Chelsi Sanchez is a 35 y.o. female who presents today with the following Chief Complaint(s): Follow up for The encounter diagnosis was Type 1 diabetes mellitus with hyperglycemia (HCC).    HPI:  Patient is a 35-year-old female who was recently diagnosed with type 1 diabetes following an ICU visit for hyperglycemia and diabetic ketoacidosis in October 2024.  Patient has no family history of type 1 diabetes, though her father does have rheumatoid arthritis.  Patient had a prior history of excessive weight, but was able to lose approximately 100 pounds through diet and exercise.  Following this she had a tummy tuck in 2020 and a hernia with mesh repair in 2022.  Patient reports she has not been feeling well since her tummy tuck which includes diffuse skin pain/sensitivity as well as hip shoulder and ankle pain.  There has been some lab evaluation of these symptoms, but no definitive diagnosis and referral to rheumatology allergy in the past was declined.  Patient also reports losing her  in 2024 to alcohol consumption.    Patient was discharged from the hospital with a lispro sliding scale and a standing Lantus dose, however the lispro sliding scale did not include adjustments for food, so she was getting high throughout the day and dropping low overnight.    11/16/24 islet cell antibody less than 1-4, jose antibody greater than 250, IA 2 antibody less than 5.4, TSH 0.693, A1c 8.3%    Dexcom 1/28/2025  Elevations noted in the 6 to 9 PM region and 6 to 9 AM region, associated with mealtime  Less than 1% very low, 2% low, 35% in, 34% high, 29% very high; GMI 8.3%; average glucose 208    Dexcom 4/1/2025  Very low less than 1%; low 0%, in range 30%, high 31%, very high 39%; GMI 8.8%; average glucose 228    4/22/25   <1% vl, 2% l, 51% in range, 34% h, 13% vh; gmi  7.6%    Current regimen:  Lantus 10 to 12 units  Sliding scale Humalog    Patient had a significant hypoglycemic episode in late 3/2025  Patient's had a Posta dinner and dosed appropriately at that time, however approximately 1 and half to 2 hours later she vomited and reports expansion most of the medial.  Within 1 to 2 hours after that she became seriously hypoglycemic, and an ambulance was called.  By the time the ambulance arrived she did been able to imbibe enough glucose to raise her sugar levels to normal and have been trending upward.  She did not go to the hospital  Patient has Baqsimi, but has not needed it since receiving it.    Pt is interested in ozempic, but we do not have a c-peptide  No personal h/o pancreatitis and no family h/o thyroid cancer  She is having stomach surgery soon and is wondering how to manage her insulin     ROS:  Per HPI, otherwise: Negative    Past Medical History:   Diagnosis Date    Bowel habit changes 2022    constipation    Cancer (HCC)     cervical dysplasia    Cancer (HCC) 2022    cervical cancer     Chronic use of opiate drugs therapeutic purposes 2017    Heart burn     Indigestion     Pain 2022    abdominal pain    PONV (postoperative nausea and vomiting)     Psychiatric problem 2022    anxiety, panic attack    Severe anxiety 2016     Social History     Tobacco Use    Smoking status: Former     Current packs/day: 0.00     Types: Cigarettes     Quit date: 2022     Years since quittin.7    Smokeless tobacco: Never    Tobacco comments:     3 cigarettes/day   Vaping Use    Vaping status: Former    Quit date: 2022    Substances: THC, CBD    Devices: Disposable   Substance Use Topics    Alcohol use: Yes     Alcohol/week: 1.2 oz     Types: 2 Glasses of wine per week     Comment: Rarely    Drug use: Not Currently     Types: Oral     Comment: Marijuana     Current Outpatient Medications   Medication Sig Dispense Refill    Continuous  "Glucose  (DEXCOM G7 ) Device Use as directed every 14 days 2 Each 11    Continuous Glucose Sensor (DEXCOM G7 SENSOR) Misc Use as directed apply 1 sensor and change every 10 days 3 Each 11    Continuous Glucose Sensor (DEXCOM G7 SENSOR) Misc Use 1 sensor topically every 10 days for 27 doses. 9 Each 2    Glucagon (BAQSIMI ONE PACK) 3 MG/DOSE Administer 1 Spray into one nostril one time as needed (use as needed for serious hypoglycemia) for up to 1 dose. May give additional dose in 15 minutes if no response. 1 Each 1    XIIDRA 5 % Solution Instill 1 drop into  both eyes twice per day 180 Each 3    Insulin Syringe-Needle U-100 31G X 15/64\" 0.5 ML Misc Use one syringe to inject insulin 3 times daily 100 Each 3    phentermine (ADIPEX-P) 37.5 MG tablet Take 1 tablet every day by oral route for 30 days. 30 Tablet 2    cycloSPORINE (RESTASIS) 0.05 % ophthalmic emulsion Instill 1 drop into both eyes twice per day 180 Each 3    Insulin Syringe-Needle U-100 (BD INSULIN SYRINGE U/F) 31G X 5/16\" 0.5 ML Misc Use 1 syringe to inject insulin three times daily 100 Each 0    phenylephrine (SUDAFED PE SINUS CONGESTION) 10 MG Tab Take 1 tablet by mouth twice a day as needed for congestion 18 Tablet 0    triamcinolone acetonide (KENALOG) 0.1 % Cream Apply thin layer to affected area(s) two times a day 30 g 1    scopolamine (TRANSDERM-SCOP) 1 mg/72hr PATCH 72 HR Apply 1 patch to skin behind one ear night before surgery 1 Patch 1    mupirocin (BACTROBAN) 2 % Ointment Apply small amount to affected area(s) three times a day 22 g 1    glucose blood strip Use 1 strip to check sugars four times a day 100 Strip 11    valacyclovir (VALTREX) 1 GM Tab Take one tablet by mouth three times a day for 7 days 21 Tablet 0    Continuous Glucose  (DEXCOM G7 ) Device Use as directed every 14 days 2 Each 11    albuterol 108 (90 Base) MCG/ACT Aero Soln inhalation aerosol Inhale 2 puffs by mouth every 4 hours as needed " "(proventil) 6.7 g 3    meloxicam (MOBIC) 15 MG tablet Take 1 tablet by mouth every day 30 Tablet 3    azithromycin (ZITHROMAX) 250 MG Tab Take 2 tablets by mouth as one dose on the first day, then take 1 tablet by mouth daily thereafter 6 Tablet 0    clobetasol (TEMOVATE) 0.05 % Cream Apply to affected area(s) on body two times a day for rash(temovate) for 14 days 60 g 2    clotrimazole-betamethasone (LOTRISONE) 1-0.05 % Cream Apply topically to affected area(s) two times a day in the monring and in the evening 15 g 1    Continuous Glucose Transmitter (DEXCOM G6 TRANSMITTER) Misc Change transmitter every 90 days as directed 1 Each 0    Blood Glucose Monitoring Suppl (BLOOD GLUCOSE MONITOR SYSTEM) w/Device Kit Test blood sugar as recommended by provider. 1 Kit 0    glucose blood strip Use one strip to test blood sugar three times daily before meals. 100 Strip 0    OneTouch Delica Lancets 30G Misc Use one lancet to test blood sugar three times daily before meals. 100 Each 0    Alcohol Swabs Wipe site with prep pad prior to injection. 100 Each 10    insulin glargine (LANTUS SOLOSTAR) 100 UNIT/ML Solution Pen-injector injection Inject 20 Units under the skin every evening. 15 mL 10    insulin lispro 100 UNIT/ML INJ Inject 0-12 units 3 times daily before meals per sliding scale as directed. For glucose 150-199 give 2 units; 200-249 give 4 units; 250-299 give 6 units; 300-349 give 8 units  350-399 give 10 units; For glu >400 give 12 units 10 mL 10    Insulin Pen Needle 32 G x 4 mm Use one pen needle in pen device to inject insulin four times daily. 100 Each 10    Insulin Syringe-Needle U-100 (BD INSULIN SYRINGE U/F) 31G X 5/16\" 0.5 ML Misc Use one syringe to inject insulin three times daily. 200 Each 0    calcium carbonate (TUMS) 500 MG Chew Tab Chew 500-1,000 mg 1 time a day as needed.      acetaminophen (TYLENOL) 325 MG Tab Take 2 Tablets by mouth every 6 hours. Alternate w/ ibuprofen to take a medication every 3 hrs, " "take scheduled for 3 days post-op then as needed 60 Tablet 0    Multiple Vitamins-Minerals (MULTIVITAMIN GUMMIES ADULTS PO) Take 2 Tablets by mouth every day.      ALPRAZolam (XANAX) 0.25 MG Tab Take 0.25 mg by mouth as needed for Anxiety.       No current facility-administered medications for this visit.       /73 (BP Location: Right arm, Patient Position: Sitting, BP Cuff Size: Adult)   Pulse 85   Ht 1.575 m (5' 2\")   Wt 68.9 kg (152 lb)   LMP 06/07/2016   SpO2 98%   BMI 27.80 kg/m²     Physical Exam:   Gen:           Alert and oriented, No apparent distress. Mood and affect appropriate, normal interaction with examiner.   Hearing:     Grossly intact.  Gait and Station: Normal.  Digits and Nails: No clubbing, cyanosis, petechiae, or nodes.   Skin:        No rashes, lesions or ulcers noted.               Ext:           No cyanosis or edema.    Assessment and Plan:     1. Type 1 diabetes mellitus with hyperglycemia (HCC)  Cgm reviewed  Continue current regimen  Decrease basal from 10 units to 6 units the night before her surgery (and she'll be NPO, so no bolus insulin)  Check c-peptide prior to f/u to determine if ozempic might benefit her  F/u in 3-4 months    - POCT Hemoglobin A1C  - Comp Metabolic Panel; Future  - MICROALBUMIN CREAT RATIO URINE; Future  - TSH WITH REFLEX TO FT4; Future  - Lipid Profile; Future  - HEMOGLOBIN A1C; Future  - C-PEPTIDE; Future      Orders Placed This Encounter    Comp Metabolic Panel    MICROALBUMIN CREAT RATIO URINE    TSH WITH REFLEX TO FT4    Lipid Profile    HEMOGLOBIN A1C    C-PEPTIDE    POCT Hemoglobin A1C       No follow-ups on file.    Please note that this dictation was created using voice recognition software. I have made every reasonable attempt to correct obvious errors, but I expect that there are errors of grammar and possibly content that I did not discover before finalizing the note.     Sylvester Lino M.D.  " Declines